# Patient Record
Sex: FEMALE | Race: OTHER | NOT HISPANIC OR LATINO | URBAN - METROPOLITAN AREA
[De-identification: names, ages, dates, MRNs, and addresses within clinical notes are randomized per-mention and may not be internally consistent; named-entity substitution may affect disease eponyms.]

---

## 2017-06-26 ENCOUNTER — INPATIENT (INPATIENT)
Facility: HOSPITAL | Age: 62
LOS: 0 days | Discharge: HOME | DRG: 287 | End: 2017-06-27
Attending: INTERNAL MEDICINE | Admitting: INTERNAL MEDICINE
Payer: COMMERCIAL

## 2017-06-26 VITALS
SYSTOLIC BLOOD PRESSURE: 125 MMHG | TEMPERATURE: 98 F | RESPIRATION RATE: 14 BRPM | HEART RATE: 82 BPM | OXYGEN SATURATION: 100 % | DIASTOLIC BLOOD PRESSURE: 74 MMHG

## 2017-06-26 DIAGNOSIS — R07.9 CHEST PAIN, UNSPECIFIED: ICD-10-CM

## 2017-06-26 LAB
ALBUMIN SERPL ELPH-MCNC: 4.5 G/DL — SIGNIFICANT CHANGE UP (ref 3.3–5)
ALP SERPL-CCNC: 65 U/L — SIGNIFICANT CHANGE UP (ref 40–120)
ALT FLD-CCNC: 39 U/L RC — SIGNIFICANT CHANGE UP (ref 10–45)
ANION GAP SERPL CALC-SCNC: 15 MMOL/L — SIGNIFICANT CHANGE UP (ref 5–17)
APTT BLD: 29.9 SEC — SIGNIFICANT CHANGE UP (ref 27.5–37.4)
AST SERPL-CCNC: 40 U/L — SIGNIFICANT CHANGE UP (ref 10–40)
BASOPHILS # BLD AUTO: 0.1 K/UL — SIGNIFICANT CHANGE UP (ref 0–0.2)
BASOPHILS NFR BLD AUTO: 0.5 % — SIGNIFICANT CHANGE UP (ref 0–2)
BILIRUB SERPL-MCNC: 0.3 MG/DL — SIGNIFICANT CHANGE UP (ref 0.2–1.2)
BUN SERPL-MCNC: 18 MG/DL — SIGNIFICANT CHANGE UP (ref 7–23)
CALCIUM SERPL-MCNC: 10.3 MG/DL — SIGNIFICANT CHANGE UP (ref 8.4–10.5)
CHLORIDE SERPL-SCNC: 99 MMOL/L — SIGNIFICANT CHANGE UP (ref 96–108)
CK MB BLD-MCNC: 1.5 % — SIGNIFICANT CHANGE UP (ref 0–3.5)
CK MB CFR SERPL CALC: 2.4 NG/ML — SIGNIFICANT CHANGE UP (ref 0–3.8)
CK SERPL-CCNC: 157 U/L — SIGNIFICANT CHANGE UP (ref 25–170)
CO2 SERPL-SCNC: 27 MMOL/L — SIGNIFICANT CHANGE UP (ref 22–31)
CREAT SERPL-MCNC: 0.78 MG/DL — SIGNIFICANT CHANGE UP (ref 0.5–1.3)
EOSINOPHIL # BLD AUTO: 0.3 K/UL — SIGNIFICANT CHANGE UP (ref 0–0.5)
EOSINOPHIL NFR BLD AUTO: 3.1 % — SIGNIFICANT CHANGE UP (ref 0–6)
GLUCOSE SERPL-MCNC: 252 MG/DL — HIGH (ref 70–99)
HCT VFR BLD CALC: 37.2 % — SIGNIFICANT CHANGE UP (ref 34.5–45)
HGB BLD-MCNC: 12.5 G/DL — SIGNIFICANT CHANGE UP (ref 11.5–15.5)
INR BLD: 1.04 RATIO — SIGNIFICANT CHANGE UP (ref 0.88–1.16)
LYMPHOCYTES # BLD AUTO: 38.2 % — SIGNIFICANT CHANGE UP (ref 13–44)
LYMPHOCYTES # BLD AUTO: 4 K/UL — HIGH (ref 1–3.3)
MCHC RBC-ENTMCNC: 26.5 PG — LOW (ref 27–34)
MCHC RBC-ENTMCNC: 33.6 GM/DL — SIGNIFICANT CHANGE UP (ref 32–36)
MCV RBC AUTO: 79 FL — LOW (ref 80–100)
MONOCYTES # BLD AUTO: 0.7 K/UL — SIGNIFICANT CHANGE UP (ref 0–0.9)
MONOCYTES NFR BLD AUTO: 6.3 % — SIGNIFICANT CHANGE UP (ref 2–14)
NEUTROPHILS # BLD AUTO: 5.4 K/UL — SIGNIFICANT CHANGE UP (ref 1.8–7.4)
NEUTROPHILS NFR BLD AUTO: 51.9 % — SIGNIFICANT CHANGE UP (ref 43–77)
NT-PROBNP SERPL-SCNC: 22 PG/ML — SIGNIFICANT CHANGE UP (ref 0–300)
PLATELET # BLD AUTO: 301 K/UL — SIGNIFICANT CHANGE UP (ref 150–400)
POTASSIUM SERPL-MCNC: 4.4 MMOL/L — SIGNIFICANT CHANGE UP (ref 3.5–5.3)
POTASSIUM SERPL-SCNC: 4.4 MMOL/L — SIGNIFICANT CHANGE UP (ref 3.5–5.3)
PROT SERPL-MCNC: 8.4 G/DL — HIGH (ref 6–8.3)
PROTHROM AB SERPL-ACNC: 11.3 SEC — SIGNIFICANT CHANGE UP (ref 9.8–12.7)
RBC # BLD: 4.71 M/UL — SIGNIFICANT CHANGE UP (ref 3.8–5.2)
RBC # FLD: 12.8 % — SIGNIFICANT CHANGE UP (ref 10.3–14.5)
SODIUM SERPL-SCNC: 141 MMOL/L — SIGNIFICANT CHANGE UP (ref 135–145)
TROPONIN T SERPL-MCNC: <0.01 NG/ML — SIGNIFICANT CHANGE UP (ref 0–0.06)
WBC # BLD: 10.4 K/UL — SIGNIFICANT CHANGE UP (ref 3.8–10.5)
WBC # FLD AUTO: 10.4 K/UL — SIGNIFICANT CHANGE UP (ref 3.8–10.5)

## 2017-06-26 PROCEDURE — 71010: CPT | Mod: 26

## 2017-06-26 PROCEDURE — 93010 ELECTROCARDIOGRAM REPORT: CPT

## 2017-06-26 PROCEDURE — 71275 CT ANGIOGRAPHY CHEST: CPT | Mod: 26

## 2017-06-26 PROCEDURE — 99285 EMERGENCY DEPT VISIT HI MDM: CPT | Mod: 25

## 2017-06-26 RX ORDER — GLUCAGON INJECTION, SOLUTION 0.5 MG/.1ML
1 INJECTION, SOLUTION SUBCUTANEOUS ONCE
Qty: 0 | Refills: 0 | Status: DISCONTINUED | OUTPATIENT
Start: 2017-06-26 | End: 2017-06-27

## 2017-06-26 RX ORDER — METOPROLOL TARTRATE 50 MG
25 TABLET ORAL
Qty: 0 | Refills: 0 | Status: DISCONTINUED | OUTPATIENT
Start: 2017-06-26 | End: 2017-06-27

## 2017-06-26 RX ORDER — DEXTROSE 50 % IN WATER 50 %
12.5 SYRINGE (ML) INTRAVENOUS ONCE
Qty: 0 | Refills: 0 | Status: DISCONTINUED | OUTPATIENT
Start: 2017-06-26 | End: 2017-06-27

## 2017-06-26 RX ORDER — INSULIN LISPRO 100/ML
VIAL (ML) SUBCUTANEOUS AT BEDTIME
Qty: 0 | Refills: 0 | Status: DISCONTINUED | OUTPATIENT
Start: 2017-06-26 | End: 2017-06-27

## 2017-06-26 RX ORDER — INSULIN LISPRO 100/ML
VIAL (ML) SUBCUTANEOUS
Qty: 0 | Refills: 0 | Status: DISCONTINUED | OUTPATIENT
Start: 2017-06-26 | End: 2017-06-27

## 2017-06-26 RX ORDER — SODIUM CHLORIDE 9 MG/ML
1000 INJECTION, SOLUTION INTRAVENOUS
Qty: 0 | Refills: 0 | Status: DISCONTINUED | OUTPATIENT
Start: 2017-06-26 | End: 2017-06-27

## 2017-06-26 RX ORDER — ENOXAPARIN SODIUM 100 MG/ML
40 INJECTION SUBCUTANEOUS DAILY
Qty: 0 | Refills: 0 | Status: DISCONTINUED | OUTPATIENT
Start: 2017-06-26 | End: 2017-06-27

## 2017-06-26 RX ORDER — CLOPIDOGREL BISULFATE 75 MG/1
75 TABLET, FILM COATED ORAL DAILY
Qty: 0 | Refills: 0 | Status: DISCONTINUED | OUTPATIENT
Start: 2017-06-26 | End: 2017-06-27

## 2017-06-26 RX ORDER — SIMVASTATIN 20 MG/1
10 TABLET, FILM COATED ORAL AT BEDTIME
Qty: 0 | Refills: 0 | Status: DISCONTINUED | OUTPATIENT
Start: 2017-06-26 | End: 2017-06-27

## 2017-06-26 RX ORDER — ASPIRIN/CALCIUM CARB/MAGNESIUM 324 MG
324 TABLET ORAL ONCE
Qty: 0 | Refills: 0 | Status: COMPLETED | OUTPATIENT
Start: 2017-06-26 | End: 2017-06-26

## 2017-06-26 RX ORDER — DEXTROSE 50 % IN WATER 50 %
25 SYRINGE (ML) INTRAVENOUS ONCE
Qty: 0 | Refills: 0 | Status: DISCONTINUED | OUTPATIENT
Start: 2017-06-26 | End: 2017-06-27

## 2017-06-26 RX ORDER — DEXTROSE 50 % IN WATER 50 %
1 SYRINGE (ML) INTRAVENOUS ONCE
Qty: 0 | Refills: 0 | Status: DISCONTINUED | OUTPATIENT
Start: 2017-06-26 | End: 2017-06-27

## 2017-06-26 RX ADMIN — Medication 324 MILLIGRAM(S): at 13:18

## 2017-06-26 RX ADMIN — Medication 25 MILLIGRAM(S): at 16:47

## 2017-06-26 RX ADMIN — SIMVASTATIN 10 MILLIGRAM(S): 20 TABLET, FILM COATED ORAL at 21:15

## 2017-06-26 NOTE — H&P ADULT - NSHPPHYSICALEXAM_GEN_ALL_CORE
PHYSICAL EXAMINATION:  Vital Signs Last 24 Hrs  T(C): 36.6, Max: 36.6 (06-26 @ 12:53)  T(F): 97.8, Max: 97.8 (06-26 @ 12:53)  HR: 91 (82 - 91)  BP: 129/71 (125/74 - 129/71)  BP(mean): --  RR: 16 (14 - 16)  SpO2: 98% (98% - 100%)  CAPILLARY BLOOD GLUCOSE        GENERAL: NAD, well-groomed, well-developed  HEAD:  atraumatic, normocephalic  EYES: sclera anicteric  ENMT: mucous membranes moist  NECK: supple, No JVD  CHEST/LUNG: clear to auscultation bilaterally; no rales, rhonchi, or wheezing b/l  HEART: normal S1, S2  ABDOMEN: BS+, soft, ND, NT   EXTREMITIES:  pulses palpable; no clubbing, cyanosis, or edema b/l LEs  NEURO: awake, alert, interactive; moves all extremities  SKIN: no rashes or lesions

## 2017-06-26 NOTE — ED PROVIDER NOTE - OBJECTIVE STATEMENT
62yF h/o DM, HTN, HLD, never smoker, no CAD last stress test 2 years ago presents with SOB and chest pain with exertion and at rest x 3 days. Pain is L sided, radiates to L arm and described as burning.  Denies recent travel, h/o cancer, h/o DVT or PE, melena, BRBPR, exogenous estrogen. On daily ASA 81, did not take ASA today.  PMD Desmond Angulo ([place])    HEART score 5

## 2017-06-26 NOTE — H&P ADULT - NSHPLABSRESULTS_GEN_ALL_CORE
LABS:                        12.5   10.4  )-----------( 301      ( 26 Jun 2017 13:00 )             37.2     06-26    141  |  99  |  18  ----------------------------<  252<H>  4.4   |  27  |  0.78    Ca    10.3      26 Jun 2017 13:00    TPro  8.4<H>  /  Alb  4.5  /  TBili  0.3  /  DBili  x   /  AST  40  /  ALT  39  /  AlkPhos  65  06-26    PT/INR - ( 26 Jun 2017 13:00 )   PT: 11.3 sec;   INR: 1.04 ratio         PTT - ( 26 Jun 2017 13:00 )  PTT:29.9 sec        RADIOLOGY & ADDITIONAL TESTS:

## 2017-06-26 NOTE — H&P ADULT - ASSESSMENT
pt  with  cp/  sob  for  1 day,  h/o  htn.  dm,  cardcalled, may need  cath,  asa. statin lopressor, tele

## 2017-06-26 NOTE — ED ADULT NURSE NOTE - OBJECTIVE STATEMENT
61 y/o F pt w/ pmh of HTN, HLD, DM, present to ED with left side chest pain that radiates left shoulder and arm, with SOB and tingling to fingers, symptoms started 3 days ago, worsen the last few days, had chest at rest as well as with exertion, pain is left side, intermittent, last about 5 minutes, along with SOB, left shoulder and arm pain tingling to b/l fingers, denies N/V/D, fever, chills, dizziness, dysuria, hematuria, on exam. 5/10 left side chest pain, paresthesia to b/l fingers, VSS, EKG done and given to MD, placed on  NSR, labs drawn and sent

## 2017-06-26 NOTE — H&P ADULT - HISTORY OF PRESENT ILLNESS
: 62yF h/o DM, HTN, HLD, never smoker, no CAD last stress test 2 years ago presents with SOB and chest pain with exertion and at rest x 3 days. Pain is L sided, radiates to L arm and described as burning.  Denies recent travel, h/o cancer, h/o DVT or PE, melena, BRBPR, exogenous estrogen. On daily ASA 81, did not take ASA today.,  seen in  er PMD Desmond Angulo ([place])  HEART score 5

## 2017-06-26 NOTE — CONSULT NOTE ADULT - SUBJECTIVE AND OBJECTIVE BOX
CHIEF COMPLAINT:Patient is a 62y old  Female who presents with a chief complaint of cp (26 Jun 2017 14:06)      HPI:  : 62yF h/o DM, HTN, HLD, never smoker, no CAD last stress test 2 years ago presents with SOB and chest pain with exertion and at rest x 3 days. Pain is L sided, radiates to L arm and described as burning.  Denies recent travel, h/o cancer, h/o DVT or PE, melena, BRBPR, exogenous estrogen. On daily ASA 81, did not take ASA today.,  seen in  er PMD Desmond Angulo ([place])  HEART score 5 (26 Jun 2017 14:06)      PAST MEDICAL & SURGICAL HISTORY:  HLD (hyperlipidemia)  DM (diabetes mellitus)  HTN (hypertension)      MEDICATIONS  (STANDING):  simvastatin 10milliGRAM(s) Oral at bedtime  metoprolol 25milliGRAM(s) Oral two times a day  enalapril 2.5milliGRAM(s) Oral daily  enoxaparin Injectable 40milliGRAM(s) SubCutaneous daily    MEDICATIONS  (PRN):      FAMILY HISTORY:      SOCIAL HISTORY:    [ ] Non-smoker  [ ] Smoker  [ ] Alcohol    Allergies    No Known Allergies    Intolerances    	    REVIEW OF SYSTEMS:  CONSTITUTIONAL: No fever, weight loss, or fatigue  EYES: No eye pain, visual disturbances, or discharge  ENT:  No difficulty hearing, tinnitus, vertigo; No sinus or throat pain  NECK: No pain or stiffness  RESPIRATORY: No cough, wheezing, chills or hemoptysis; No Shortness of Breath  CARDIOVASCULAR: + chest pain, no palpitations, passing out, dizziness, or leg swelling  GASTROINTESTINAL: No abdominal or epigastric pain. No nausea, vomiting, or hematemesis; No diarrhea or constipation. No melena or hematochezia.  GENITOURINARY: No dysuria, frequency, hematuria, or incontinence  NEUROLOGICAL: No headaches, memory loss, loss of strength, numbness, or tremors  SKIN: No itching, burning, rashes, or lesions   LYMPH Nodes: No enlarged glands  ENDOCRINE: No heat or cold intolerance; No hair loss  MUSCULOSKELETAL: No joint pain or swelling; No muscle, back, or extremity pain  PSYCHIATRIC: No depression, anxiety, mood swings, or difficulty sleeping  HEME/LYMPH: No easy bruising, or bleeding gums  ALLERGY AND IMMUNOLOGIC: No hives or eczema	    [ ] All others negative	  [ ] Unable to obtain    PHYSICAL EXAM:  T(C): 36.6, Max: 36.6 (06-26 @ 12:53)  HR: 91 (82 - 91)  BP: 129/71 (125/74 - 129/71)  RR: 16 (14 - 16)  SpO2: 98% (98% - 100%)  Wt(kg): --  I&O's Summary      Appearance: Normal	  HEENT:   Normal oral mucosa, PERRL, EOMI	  Lymphatic: No lymphadenopathy  Cardiovascular: Normal S1 S2, No JVD, No murmurs, No edema  Respiratory: Lungs clear to auscultation	  Psychiatry: A & O x 3, Mood & affect appropriate  Gastrointestinal:  Soft, Non-tender, + BS	  Skin: No rashes, No ecchymoses, No cyanosis	  Neurologic: Non-focal  Extremities: Normal range of motion, No clubbing, cyanosis or edema  Vascular: Peripheral pulses palpable 2+ bilaterally    TELEMETRY: 	    ECG:  	  RADIOLOGY:  OTHER: 	  	  LABS:	 	    CARDIAC MARKERS:  CARDIAC MARKERS ( 26 Jun 2017 13:00 )  x     / <0.01 ng/mL / 157 U/L / x     / 2.4 ng/mL                              12.5   10.4  )-----------( 301      ( 26 Jun 2017 13:00 )             37.2     06-26    141  |  99  |  18  ----------------------------<  252<H>  4.4   |  27  |  0.78    Ca    10.3      26 Jun 2017 13:00    TPro  8.4<H>  /  Alb  4.5  /  TBili  0.3  /  DBili  x   /  AST  40  /  ALT  39  /  AlkPhos  65  06-26    proBNP: Serum Pro-Brain Natriuretic Peptide: 22 pg/mL (06-26 @ 13:00)    Lipid Profile:   HgA1c:   TSH:     PREVIOUS DIAGNOSTIC TESTING:    [ ] Echocardiogram:  [ ]  Catheterization:  [ ] Stress Test:

## 2017-06-26 NOTE — ED PROVIDER NOTE - ATTENDING CONTRIBUTION TO CARE
Private Physician  Desmond Angulo (PCP/ [facility]),   62y female pmh DM,HTN,HLD, no habits, no coronary artery disease,last stress test 2y ago, reported "OK" to pt, No travel, cancer, mi,pe/dvt,ocp, pt comes to ed complains of chest pain shortness of breath. past 4days. Frequent onset with walking. and lying down. CP 9/10 to 5/10. Rad to left arm, no cough. fever chills. no abd pain +occasional diaphoresis. PE WDWN Female nad normocephalic atraumatic chest clesar anterior & posterior abd soft +bs, Cv no rmg, neuro no focal defects  Simon Garay MD, [place]

## 2017-06-26 NOTE — H&P ADULT - NSHPREVIEWOFSYSTEMS_GEN_ALL_CORE
REVIEW OF SYSTEMS:    CONSTITUTIONAL: No weakness, fevers or chills  EYES/ENT: No visual changes;  No vertigo or throat pain   NECK: No pain or stiffness  RESPIRATORY: No cough, wheezing, hemoptysis; No shortness of breath  CARDIOVASCULAR:  chest pain , no palpitations  GASTROINTESTINAL: No abdominal or epigastric pain. No nausea, vomiting, or hematemesis; No diarrhea or constipation. No melena or hematochezia.  GENITOURINARY: No dysuria, frequency or hematuria  NEUROLOGICAL: No numbness or weakness  SKIN: No itching, rashes

## 2017-06-27 VITALS
DIASTOLIC BLOOD PRESSURE: 70 MMHG | SYSTOLIC BLOOD PRESSURE: 124 MMHG | RESPIRATION RATE: 18 BRPM | OXYGEN SATURATION: 95 % | HEART RATE: 78 BPM

## 2017-06-27 LAB
HBA1C BLD-MCNC: 9 % — HIGH (ref 4–5.6)
TROPONIN T SERPL-MCNC: <0.01 NG/ML — SIGNIFICANT CHANGE UP (ref 0–0.06)

## 2017-06-27 PROCEDURE — 83880 ASSAY OF NATRIURETIC PEPTIDE: CPT

## 2017-06-27 PROCEDURE — C1769: CPT

## 2017-06-27 PROCEDURE — 84484 ASSAY OF TROPONIN QUANT: CPT

## 2017-06-27 PROCEDURE — 71045 X-RAY EXAM CHEST 1 VIEW: CPT

## 2017-06-27 PROCEDURE — 93458 L HRT ARTERY/VENTRICLE ANGIO: CPT

## 2017-06-27 PROCEDURE — 82553 CREATINE MB FRACTION: CPT

## 2017-06-27 PROCEDURE — 85027 COMPLETE CBC AUTOMATED: CPT

## 2017-06-27 PROCEDURE — 93005 ELECTROCARDIOGRAM TRACING: CPT

## 2017-06-27 PROCEDURE — 85730 THROMBOPLASTIN TIME PARTIAL: CPT

## 2017-06-27 PROCEDURE — 71275 CT ANGIOGRAPHY CHEST: CPT

## 2017-06-27 PROCEDURE — 85379 FIBRIN DEGRADATION QUANT: CPT

## 2017-06-27 PROCEDURE — C1887: CPT

## 2017-06-27 PROCEDURE — 99285 EMERGENCY DEPT VISIT HI MDM: CPT | Mod: 25

## 2017-06-27 PROCEDURE — 80053 COMPREHEN METABOLIC PANEL: CPT

## 2017-06-27 PROCEDURE — 83036 HEMOGLOBIN GLYCOSYLATED A1C: CPT

## 2017-06-27 PROCEDURE — 85610 PROTHROMBIN TIME: CPT

## 2017-06-27 PROCEDURE — C1894: CPT

## 2017-06-27 PROCEDURE — 82550 ASSAY OF CK (CPK): CPT

## 2017-06-27 RX ORDER — METOPROLOL TARTRATE 50 MG
1 TABLET ORAL
Qty: 0 | Refills: 0 | DISCHARGE
Start: 2017-06-27

## 2017-06-27 RX ORDER — SIMVASTATIN 20 MG/1
1 TABLET, FILM COATED ORAL
Qty: 0 | Refills: 0 | DISCHARGE
Start: 2017-06-27

## 2017-06-27 RX ADMIN — Medication 25 MILLIGRAM(S): at 17:08

## 2017-06-27 RX ADMIN — Medication 4: at 17:07

## 2017-06-27 RX ADMIN — Medication 25 MILLIGRAM(S): at 05:47

## 2017-06-27 RX ADMIN — Medication 2: at 08:10

## 2017-06-27 RX ADMIN — CLOPIDOGREL BISULFATE 75 MILLIGRAM(S): 75 TABLET, FILM COATED ORAL at 08:10

## 2017-06-27 RX ADMIN — Medication 2.5 MILLIGRAM(S): at 05:47

## 2017-06-27 NOTE — DISCHARGE NOTE ADULT - HOSPITAL COURSE
To be completed by attending physician Pt adm with CP.  Cath with mild CAD.  DC in stable condition. F/U pmd next week.

## 2017-06-27 NOTE — DISCHARGE NOTE ADULT - CARE PLAN
Principal Discharge DX:	Chest pain with high risk for cardiac etiology  Secondary Diagnosis:	HLD (hyperlipidemia)  Secondary Diagnosis:	HTN (hypertension) Principal Discharge DX:	Chest pain with high risk for cardiac etiology  Goal:	s/p cardiac catheterization with non-obstructive CAD  Instructions for follow-up, activity and diet:	Continue current medications  follow up with cardiologist  Secondary Diagnosis:	HLD (hyperlipidemia)  Instructions for follow-up, activity and diet:	continue simvastatin  Secondary Diagnosis:	HTN (hypertension)  Instructions for follow-up, activity and diet:	continue current home medications

## 2017-06-27 NOTE — DISCHARGE NOTE ADULT - PATIENT PORTAL LINK FT
“You can access the Follow[facility] Patient Portal, offered by [facility], by registering with the following website: [email or web address]”

## 2017-06-27 NOTE — DISCHARGE NOTE ADULT - PROVIDER TOKENS
TOKEN:'[email or web address]',FREE:[LAST:[PMD in 1week],PHONE:[(   )    -],FAX:[(   )    -]] TOKEN:'[email or web address]',FREE:[LAST:[PMD in 1week],PHONE:[(   )    -],FAX:[(   )    -]],FREE:[LAST:[Cardiologist in 1week],PHONE:[(   )    -],FAX:[(   )    -]]

## 2017-06-27 NOTE — DISCHARGE NOTE ADULT - MEDICATION SUMMARY - MEDICATIONS TO TAKE
I will START or STAY ON the medications listed below when I get home from the hospital:    aspirin 81 mg oral delayed release capsule  -- 81 milligram(s) by mouth once a day  -- Indication: For Chest pain    enalapril 2.5 mg oral tablet  -- 1 tab(s) by mouth once a day  -- Indication: For HTN (hypertension)    gabapentin 300 mg oral tablet  -- 300 milligram(s) by mouth 2 times a day  -- Indication: For neuropathy    glyburide-metformin 5 mg-500 mg oral tablet  -- 1 tab(s) by mouth 2 times a day  -- Indication: For DM (diabetes mellitus)    Januvia 50 mg oral tablet  -- 1 tab(s) by mouth 2 times a day  -- Indication: For DM (diabetes mellitus)    simvastatin 10 mg oral tablet  -- 1 tab(s) by mouth once a day (at bedtime)  -- Indication: For HLD (hyperlipidemia)    metoprolol tartrate 25 mg oral tablet  -- 1 tab(s) by mouth 2 times a day  -- Indication: For HTN (hypertension)    NexIUM 40 mg oral delayed release capsule  -- 1 cap(s) by mouth once a day  -- Indication: For gerd

## 2017-06-27 NOTE — DISCHARGE NOTE ADULT - CARE PROVIDER_API CALL
Chapito Pham (DO), Cardiology; Interventional Cardiology  [address] [place] [address]  Phone: [phone]  Fax: [phone]    PMD in 1week,   Phone: (   )    -  Fax: (   )    - Chapito Pham (DO), Cardiology; Interventional Cardiology  [address] [place] [address]  Phone: [phone]  Fax: [phone]    PMD in 1week,   Phone: (   )    -  Fax: (   )    -    Cardiologist in 1week,   Phone: (   )    -  Fax: (   )    -

## 2017-06-27 NOTE — DISCHARGE NOTE ADULT - PLAN OF CARE
s/p cardiac catheterization with non-obstructive CAD Continue current medications  follow up with cardiologist continue simvastatin continue current home medications

## 2017-06-27 NOTE — PROGRESS NOTE ADULT - SUBJECTIVE AND OBJECTIVE BOX
- Patient seen and examined.  - In summary, patient is a 62y year old woman who presented with cp (2017 14:06)  - Today, patient is without complaints.         *****MEDICATIONS:    MEDICATIONS  (STANDING):  simvastatin 10 milliGRAM(s) Oral at bedtime  metoprolol 25 milliGRAM(s) Oral two times a day  enalapril 2.5 milliGRAM(s) Oral daily  enoxaparin Injectable 40 milliGRAM(s) SubCutaneous daily  clopidogrel Tablet 75 milliGRAM(s) Oral daily  insulin lispro (HumaLOG) corrective regimen sliding scale   SubCutaneous three times a day before meals  insulin lispro (HumaLOG) corrective regimen sliding scale   SubCutaneous at bedtime  dextrose 5%. 1000 milliLiter(s) (50 mL/Hr) IV Continuous <Continuous>  dextrose 50% Injectable 12.5 Gram(s) IV Push once  dextrose 50% Injectable 25 Gram(s) IV Push once  dextrose 50% Injectable 25 Gram(s) IV Push once    MEDICATIONS  (PRN):  dextrose Gel 1 Dose(s) Oral once PRN Blood Glucose LESS THAN 70 milliGRAM(s)/deciliter  glucagon  Injectable 1 milliGRAM(s) IntraMuscular once PRN Glucose LESS THAN 70 milligrams/deciliter           ***** REVIEW OF SYSTEM:  GEN: no fever, no chills, no pain  RESP: no SOB, no cough, no sputum  CVS: no chest pain, no palpitations, no edema  GI: no abdominal pain, no nausea, no vomiting, no constipation, no diarrhea  : no dysurea, no frequency  NEURO: no headache, no diziness  PSYCH: no depression, not anxious  Derm : no itching, no rash         ***** VITAL SIGNS:  T(F): 97.6 (17 @ 04:47), Max: 97.8 (17 @ 12:53)  HR: 67 (17 @ 05:31) (67 - 91)  BP: 120/74 (17 @ 05:31) (111/71 - 129/71)  RR: 18 (17 @ 04:47) (14 - 18)  SpO2: 97% (17 @ 04:47) (96% - 100%)  Wt(kg): --  ,   I&O's Summary    2017 07:01  -  2017 07:00  --------------------------------------------------------  IN: 240 mL / OUT: 0 mL / NET: 240 mL    2017 07:  -  2017 10:34  --------------------------------------------------------  IN: 360 mL / OUT: 0 mL / NET: 360 mL             *****PHYSICAL EXAM:  GEN: A&O X 3 , NAD , comfortable  HEENT: NCAT, EOMI, MMM, no icterus  NECK: Supple, No JVD  CVS: S1S2 , regular , No M/R/G appreciated  PULM: CTA B/L,  no W/R/R appreciated  ABD.: soft. non tender, non distended,  bowel sounds present  Extrem: intact pulses , no edema noted  Derm: No rash or ecchymosis noted  PSYCH: normal mood, no depression, not anxious         *****LAB AND IMAGIN.5   10.4  )-----------( 301      ( 2017 13:00 )             37.2                   141  |  99  |  18  ----------------------------<  252<H>  4.4   |  27  |  0.78    Ca    10.3      2017 13:00    TPro  8.4<H>  /  Alb  4.5  /  TBili  0.3  /  DBili  x   /  AST  40  /  ALT  39  /  AlkPhos  65  06-    PT/INR - ( 2017 13:00 )   PT: 11.3 sec;   INR: 1.04 ratio         PTT - ( 2017 13:00 )  PTT:29.9 sec       CARDIAC MARKERS ( 2017 23:57 )  x     / <0.01 ng/mL / x     / x     / x      CARDIAC MARKERS ( 2017 13:00 )  x     / <0.01 ng/mL / 157 U/L / x     / 2.4 ng/mL                [All pertinent recent Imaging/Reports reviewed]         *****A S S E S S M E N T   A N D   P L A N :  62F CP, CAD risk factors  cath today  cont ASA  VSS  cont current tx        __________________________  A. LAURA Pham.

## 2018-12-17 ENCOUNTER — EMERGENCY (EMERGENCY)
Facility: HOSPITAL | Age: 63
LOS: 1 days | Discharge: HOME | End: 2018-12-17
Attending: EMERGENCY MEDICINE
Payer: COMMERCIAL

## 2018-12-17 VITALS
HEART RATE: 79 BPM | HEIGHT: 62 IN | WEIGHT: 175.05 LBS | SYSTOLIC BLOOD PRESSURE: 137 MMHG | DIASTOLIC BLOOD PRESSURE: 83 MMHG | OXYGEN SATURATION: 98 % | TEMPERATURE: 98 F | RESPIRATION RATE: 18 BRPM

## 2018-12-17 VITALS
OXYGEN SATURATION: 99 % | HEART RATE: 76 BPM | DIASTOLIC BLOOD PRESSURE: 72 MMHG | SYSTOLIC BLOOD PRESSURE: 131 MMHG | RESPIRATION RATE: 18 BRPM | TEMPERATURE: 98 F

## 2018-12-17 PROBLEM — I10 ESSENTIAL (PRIMARY) HYPERTENSION: Chronic | Status: ACTIVE | Noted: 2017-06-26

## 2018-12-17 PROBLEM — E78.5 HYPERLIPIDEMIA, UNSPECIFIED: Chronic | Status: ACTIVE | Noted: 2017-06-26

## 2018-12-17 PROBLEM — E11.9 TYPE 2 DIABETES MELLITUS WITHOUT COMPLICATIONS: Chronic | Status: ACTIVE | Noted: 2017-06-26

## 2018-12-17 LAB
ALBUMIN SERPL ELPH-MCNC: 4.2 G/DL — SIGNIFICANT CHANGE UP (ref 3.3–5)
ALP SERPL-CCNC: 67 U/L — SIGNIFICANT CHANGE UP (ref 40–120)
ALT FLD-CCNC: 19 U/L — SIGNIFICANT CHANGE UP (ref 10–45)
ANION GAP SERPL CALC-SCNC: 13 MMOL/L — SIGNIFICANT CHANGE UP (ref 5–17)
AST SERPL-CCNC: 19 U/L — SIGNIFICANT CHANGE UP (ref 10–40)
BASOPHILS # BLD AUTO: 0.1 K/UL — SIGNIFICANT CHANGE UP (ref 0–0.2)
BASOPHILS NFR BLD AUTO: 0.7 % — SIGNIFICANT CHANGE UP (ref 0–2)
BILIRUB SERPL-MCNC: 0.2 MG/DL — SIGNIFICANT CHANGE UP (ref 0.2–1.2)
BUN SERPL-MCNC: 16 MG/DL — SIGNIFICANT CHANGE UP (ref 7–23)
CALCIUM SERPL-MCNC: 9.4 MG/DL — SIGNIFICANT CHANGE UP (ref 8.4–10.5)
CHLORIDE SERPL-SCNC: 101 MMOL/L — SIGNIFICANT CHANGE UP (ref 96–108)
CO2 SERPL-SCNC: 25 MMOL/L — SIGNIFICANT CHANGE UP (ref 22–31)
CREAT SERPL-MCNC: 0.64 MG/DL — SIGNIFICANT CHANGE UP (ref 0.5–1.3)
EOSINOPHIL # BLD AUTO: 0.3 K/UL — SIGNIFICANT CHANGE UP (ref 0–0.5)
EOSINOPHIL NFR BLD AUTO: 3.4 % — SIGNIFICANT CHANGE UP (ref 0–6)
GLUCOSE SERPL-MCNC: 92 MG/DL — SIGNIFICANT CHANGE UP (ref 70–99)
HCT VFR BLD CALC: 34.7 % — SIGNIFICANT CHANGE UP (ref 34.5–45)
HGB BLD-MCNC: 11.5 G/DL — SIGNIFICANT CHANGE UP (ref 11.5–15.5)
LYMPHOCYTES # BLD AUTO: 3.7 K/UL — HIGH (ref 1–3.3)
LYMPHOCYTES # BLD AUTO: 38.1 % — SIGNIFICANT CHANGE UP (ref 13–44)
MCHC RBC-ENTMCNC: 25.9 PG — LOW (ref 27–34)
MCHC RBC-ENTMCNC: 33.2 GM/DL — SIGNIFICANT CHANGE UP (ref 32–36)
MCV RBC AUTO: 78 FL — LOW (ref 80–100)
MONOCYTES # BLD AUTO: 0.5 K/UL — SIGNIFICANT CHANGE UP (ref 0–0.9)
MONOCYTES NFR BLD AUTO: 4.8 % — SIGNIFICANT CHANGE UP (ref 2–14)
NEUTROPHILS # BLD AUTO: 5.2 K/UL — SIGNIFICANT CHANGE UP (ref 1.8–7.4)
NEUTROPHILS NFR BLD AUTO: 53 % — SIGNIFICANT CHANGE UP (ref 43–77)
PLATELET # BLD AUTO: 298 K/UL — SIGNIFICANT CHANGE UP (ref 150–400)
POTASSIUM SERPL-MCNC: 4.6 MMOL/L — SIGNIFICANT CHANGE UP (ref 3.5–5.3)
POTASSIUM SERPL-SCNC: 4.6 MMOL/L — SIGNIFICANT CHANGE UP (ref 3.5–5.3)
PROT SERPL-MCNC: 7.6 G/DL — SIGNIFICANT CHANGE UP (ref 6–8.3)
RBC # BLD: 4.45 M/UL — SIGNIFICANT CHANGE UP (ref 3.8–5.2)
RBC # FLD: 12.7 % — SIGNIFICANT CHANGE UP (ref 10.3–14.5)
SODIUM SERPL-SCNC: 139 MMOL/L — SIGNIFICANT CHANGE UP (ref 135–145)
TROPONIN T, HIGH SENSITIVITY RESULT: 7 NG/L — SIGNIFICANT CHANGE UP (ref 0–51)
TROPONIN T, HIGH SENSITIVITY RESULT: 8 NG/L — SIGNIFICANT CHANGE UP (ref 0–51)
WBC # BLD: 9.8 K/UL — SIGNIFICANT CHANGE UP (ref 3.8–10.5)
WBC # FLD AUTO: 9.8 K/UL — SIGNIFICANT CHANGE UP (ref 3.8–10.5)

## 2018-12-17 PROCEDURE — 85027 COMPLETE CBC AUTOMATED: CPT

## 2018-12-17 PROCEDURE — 99285 EMERGENCY DEPT VISIT HI MDM: CPT | Mod: 25

## 2018-12-17 PROCEDURE — 93005 ELECTROCARDIOGRAM TRACING: CPT

## 2018-12-17 PROCEDURE — 84484 ASSAY OF TROPONIN QUANT: CPT

## 2018-12-17 PROCEDURE — 71275 CT ANGIOGRAPHY CHEST: CPT

## 2018-12-17 PROCEDURE — 99284 EMERGENCY DEPT VISIT MOD MDM: CPT | Mod: 25

## 2018-12-17 PROCEDURE — 93010 ELECTROCARDIOGRAM REPORT: CPT

## 2018-12-17 PROCEDURE — 36415 COLL VENOUS BLD VENIPUNCTURE: CPT

## 2018-12-17 PROCEDURE — 80053 COMPREHEN METABOLIC PANEL: CPT

## 2018-12-17 PROCEDURE — 71275 CT ANGIOGRAPHY CHEST: CPT | Mod: 26

## 2018-12-17 RX ORDER — ACETAMINOPHEN 500 MG
650 TABLET ORAL ONCE
Qty: 0 | Refills: 0 | Status: COMPLETED | OUTPATIENT
Start: 2018-12-17 | End: 2018-12-17

## 2018-12-17 RX ADMIN — Medication 650 MILLIGRAM(S): at 08:29
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
## 2018-12-17 NOTE — ED PROVIDER NOTE - CARE PLAN
Principal Discharge DX:	Chest pain at rest Principal Discharge DX:	Chest pain at rest  Assessment and plan of treatment:	1. Follow up with your primary care physician within 2-3days for reevaluation.  2.  Return to the Emergency Department for worsening, progressive or any other concerning symptoms.

## 2018-12-17 NOTE — ED PROVIDER NOTE - NSFOLLOWUPINSTRUCTIONS_ED_ALL_ED_FT
1. Follow up with your primary care physician within 2-3days for reevaluation.  2.  Return to the Emergency Department for worsening, progressive or any other concerning symptoms.   3.  Please take tylenol 650 mg every 4 hours as needed for pain. Please do not exceed more than 4,000mg of Tylenol in a day
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
## 2018-12-17 NOTE — ED PROVIDER NOTE - PHYSICAL EXAMINATION
Gen: NAD  Head: NCAT  Lung: CTAB, no respiratory distress, no wheezing, rales, rhonchi  CV: +chest wall tenderness mid-chest region, normal s1/s2, rrr, no murmurs, Normal perfusion, pulses 2+ throughout  Abd: soft, NTND  MSK: No edema, no visible deformities, full range of motion in all 4 extremities,  strength 5/5 bilaterally  Neuro: CN II-XII grossly intact, No focal neurologic deficits, no midline tenderness cervical spine, +paraspinal cervical tenderness on left  Skin: No rash   Psych: normal affect

## 2020-05-23 ENCOUNTER — TRANSCRIPTION ENCOUNTER (OUTPATIENT)
Age: 65
End: 2020-05-23

## 2020-10-31 ENCOUNTER — TRANSCRIPTION ENCOUNTER (OUTPATIENT)
Age: 65
End: 2020-10-31

## 2021-03-04 ENCOUNTER — APPOINTMENT (OUTPATIENT)
Dept: OTOLARYNGOLOGY | Facility: CLINIC | Age: 66
End: 2021-03-04
Payer: MEDICARE

## 2021-03-04 VITALS
DIASTOLIC BLOOD PRESSURE: 84 MMHG | SYSTOLIC BLOOD PRESSURE: 158 MMHG | WEIGHT: 175 LBS | BODY MASS INDEX: 29.16 KG/M2 | HEIGHT: 65 IN | HEART RATE: 73 BPM

## 2021-03-04 DIAGNOSIS — Z83.3 FAMILY HISTORY OF DIABETES MELLITUS: ICD-10-CM

## 2021-03-04 DIAGNOSIS — Z78.9 OTHER SPECIFIED HEALTH STATUS: ICD-10-CM

## 2021-03-04 DIAGNOSIS — E04.9 NONTOXIC GOITER, UNSPECIFIED: ICD-10-CM

## 2021-03-04 PROCEDURE — 99204 OFFICE O/P NEW MOD 45 MIN: CPT | Mod: 25

## 2021-03-04 PROCEDURE — 99072 ADDL SUPL MATRL&STAF TM PHE: CPT

## 2021-03-04 PROCEDURE — 69210 REMOVE IMPACTED EAR WAX UNI: CPT

## 2021-03-04 PROCEDURE — 31579 LARYNGOSCOPY TELESCOPIC: CPT

## 2021-03-04 RX ORDER — INSULIN DETEMIR 100 [IU]/ML
100 INJECTION, SOLUTION SUBCUTANEOUS
Refills: 0 | Status: ACTIVE | COMMUNITY

## 2021-03-04 RX ORDER — AMLODIPINE BESYLATE 5 MG/1
TABLET ORAL
Refills: 0 | Status: ACTIVE | COMMUNITY

## 2021-03-04 RX ORDER — SIMVASTATIN 80 MG/1
TABLET, FILM COATED ORAL
Refills: 0 | Status: ACTIVE | COMMUNITY

## 2021-03-04 RX ORDER — METOPROLOL TARTRATE 75 MG/1
TABLET, FILM COATED ORAL
Refills: 0 | Status: ACTIVE | COMMUNITY

## 2021-03-04 RX ORDER — METFORMIN HYDROCHLORIDE 625 MG/1
TABLET ORAL
Refills: 0 | Status: ACTIVE | COMMUNITY

## 2021-03-04 NOTE — REASON FOR VISIT
[Initial Evaluation] : an initial evaluation for [FreeTextEntry2] : bilateral clogged ears and throat check.

## 2021-03-04 NOTE — CONSULT LETTER
[Consult Letter:] : I had the pleasure of evaluating your patient, [unfilled]. [Please see my note below.] : Please see my note below. [Consult Closing:] : Thank you very much for allowing me to participate in the care of this patient.  If you have any questions, please do not hesitate to contact me. [Sincerely,] : Sincerely, [FreeTextEntry3] : Pierre Rueda MD, PhD\par Chief, Division of Laryngology\par Department of Otolaryngology\par [facility]\par Pediatric Otolaryngology, [facility]\par  of Otolaryngology\par [employer]\par

## 2021-03-04 NOTE — HISTORY OF PRESENT ILLNESS
[de-identified] : 66 year female bilateral clogged ears and throat check. Reports bilateral clogged ears. Denies otalgia, otorrhea, ear infections, hearing loss, tinnitus, dizziness, vertigo, headaches related to hearing. States had history of right sided neck swelling, had biopsy 03/2019 and was told it was normal. Denies throat pain, dysphagia, odynophagia, dyspnea, dysphonia, otalgia. Does use over the counter antacids or reflux medications. Denies history of smoking and alcohol use. Had previous thyroid biopsy. Minimal globus sensation during the day, endorses at night. Using reflux meds for GERD. Possible HL.\par  \par

## 2021-03-11 ENCOUNTER — APPOINTMENT (OUTPATIENT)
Dept: OTOLARYNGOLOGY | Facility: CLINIC | Age: 66
End: 2021-03-11

## 2021-03-17 DIAGNOSIS — K21.9 GASTRO-ESOPHAGEAL REFLUX DISEASE W/OUT ESOPHAGITIS: ICD-10-CM

## 2021-03-20 ENCOUNTER — OUTPATIENT (OUTPATIENT)
Dept: OUTPATIENT SERVICES | Facility: HOSPITAL | Age: 66
LOS: 1 days | End: 2021-03-20
Payer: MEDICARE

## 2021-03-20 ENCOUNTER — APPOINTMENT (OUTPATIENT)
Dept: ULTRASOUND IMAGING | Facility: IMAGING CENTER | Age: 66
End: 2021-03-20
Payer: MEDICARE

## 2021-03-20 ENCOUNTER — RESULT REVIEW (OUTPATIENT)
Age: 66
End: 2021-03-20

## 2021-03-20 DIAGNOSIS — E04.9 NONTOXIC GOITER, UNSPECIFIED: ICD-10-CM

## 2021-03-20 PROCEDURE — 76536 US EXAM OF HEAD AND NECK: CPT

## 2021-03-20 PROCEDURE — 76536 US EXAM OF HEAD AND NECK: CPT | Mod: 26

## 2021-05-13 ENCOUNTER — APPOINTMENT (OUTPATIENT)
Dept: OTOLARYNGOLOGY | Facility: CLINIC | Age: 66
End: 2021-05-13
Payer: MEDICARE

## 2021-05-13 VITALS
BODY MASS INDEX: 30.73 KG/M2 | DIASTOLIC BLOOD PRESSURE: 79 MMHG | HEART RATE: 83 BPM | HEIGHT: 64 IN | WEIGHT: 180 LBS | SYSTOLIC BLOOD PRESSURE: 125 MMHG

## 2021-05-13 PROCEDURE — 99214 OFFICE O/P EST MOD 30 MIN: CPT | Mod: 25

## 2021-05-13 PROCEDURE — 31579 LARYNGOSCOPY TELESCOPIC: CPT

## 2021-05-13 NOTE — CONSULT LETTER
[Dear  ___] : Dear  [unfilled], [Courtesy Letter:] : I had the pleasure of seeing your patient, [unfilled], in my office today. [Please see my note below.] : Please see my note below. [Consult Closing:] : Thank you very much for allowing me to participate in the care of this patient.  If you have any questions, please do not hesitate to contact me. [Sincerely,] : Sincerely, [FreeTextEntry2] : Dr. Natasha Angulo MD [FreeTextEntry3] : Pierre Rueda MD, PhD\par Chief, Division of Laryngology\par Department of Otolaryngology\par [facility]\par Pediatric Otolaryngology, [facility]\par  of Otolaryngology\par [facility] [employer] at [facility]

## 2021-05-13 NOTE — HISTORY OF PRESENT ILLNESS
[de-identified] : 66 year old female follow up for vocal fold paresis, history of Left neck mass and thyroid nodule, otalgia with clogged ears, LPR treated with esomeprazole, Thyroid US 3/20/21.  Reports no throat issues, mild voice changes secondary to seasonal allergies.  Denies throat pain, dysphagia, dyspnea, hemoptysis, recent fevers and throat infections.  Tolerating eating/drinking with no issues.  Reports recent Right otalgia, for the past week, when swallowing, pain becomes more intense.  Reports Right ear fullness/pressure.  Denies otorrhea, changes in hearing and recent ear infections.\par U/S 3/2021 shows similar single dominant nodule on right, previously biopsied, benign. Took abx from pcp. Taking ppi, helps.

## 2021-05-13 NOTE — REASON FOR VISIT
[Subsequent Evaluation] : a subsequent evaluation for [FreeTextEntry2] : follow up for vocal fold paresis

## 2021-05-19 ENCOUNTER — INPATIENT (INPATIENT)
Facility: HOSPITAL | Age: 66
LOS: 3 days | Discharge: HOME | DRG: 300 | End: 2021-05-23
Attending: INTERNAL MEDICINE | Admitting: INTERNAL MEDICINE
Payer: MEDICARE

## 2021-05-19 VITALS
DIASTOLIC BLOOD PRESSURE: 83 MMHG | OXYGEN SATURATION: 99 % | WEIGHT: 179.9 LBS | HEIGHT: 62 IN | SYSTOLIC BLOOD PRESSURE: 153 MMHG | HEART RATE: 93 BPM | TEMPERATURE: 98 F | RESPIRATION RATE: 17 BRPM

## 2021-05-19 DIAGNOSIS — I82.602 ACUTE EMBOLISM AND THROMBOSIS OF UNSPECIFIED VEINS OF LEFT UPPER EXTREMITY: ICD-10-CM

## 2021-05-19 LAB
ALBUMIN SERPL ELPH-MCNC: 4.1 G/DL — SIGNIFICANT CHANGE UP (ref 3.3–5)
ALP SERPL-CCNC: 75 U/L — SIGNIFICANT CHANGE UP (ref 40–120)
ALT FLD-CCNC: 39 U/L — SIGNIFICANT CHANGE UP (ref 10–45)
ANION GAP SERPL CALC-SCNC: 14 MMOL/L — SIGNIFICANT CHANGE UP (ref 5–17)
APTT BLD: 106 SEC — HIGH (ref 27.5–35.5)
APTT BLD: 29.3 SEC — SIGNIFICANT CHANGE UP (ref 27.5–35.5)
AST SERPL-CCNC: 21 U/L — SIGNIFICANT CHANGE UP (ref 10–40)
BASOPHILS # BLD AUTO: 0.08 K/UL — SIGNIFICANT CHANGE UP (ref 0–0.2)
BASOPHILS NFR BLD AUTO: 0.7 % — SIGNIFICANT CHANGE UP (ref 0–2)
BILIRUB SERPL-MCNC: 0.3 MG/DL — SIGNIFICANT CHANGE UP (ref 0.2–1.2)
BUN SERPL-MCNC: 21 MG/DL — SIGNIFICANT CHANGE UP (ref 7–23)
CALCIUM SERPL-MCNC: 9.9 MG/DL — SIGNIFICANT CHANGE UP (ref 8.4–10.5)
CHLORIDE SERPL-SCNC: 98 MMOL/L — SIGNIFICANT CHANGE UP (ref 96–108)
CO2 SERPL-SCNC: 22 MMOL/L — SIGNIFICANT CHANGE UP (ref 22–31)
CREAT SERPL-MCNC: 0.68 MG/DL — SIGNIFICANT CHANGE UP (ref 0.5–1.3)
EOSINOPHIL # BLD AUTO: 0.47 K/UL — SIGNIFICANT CHANGE UP (ref 0–0.5)
EOSINOPHIL NFR BLD AUTO: 4 % — SIGNIFICANT CHANGE UP (ref 0–6)
GLUCOSE BLDC GLUCOMTR-MCNC: 332 MG/DL — HIGH (ref 70–99)
GLUCOSE BLDC GLUCOMTR-MCNC: 368 MG/DL — HIGH (ref 70–99)
GLUCOSE SERPL-MCNC: 226 MG/DL — HIGH (ref 70–99)
HCT VFR BLD CALC: 37.2 % — SIGNIFICANT CHANGE UP (ref 34.5–45)
HCT VFR BLD CALC: 37.4 % — SIGNIFICANT CHANGE UP (ref 34.5–45)
HGB BLD-MCNC: 11.7 G/DL — SIGNIFICANT CHANGE UP (ref 11.5–15.5)
HGB BLD-MCNC: 12 G/DL — SIGNIFICANT CHANGE UP (ref 11.5–15.5)
IMM GRANULOCYTES NFR BLD AUTO: 0.5 % — SIGNIFICANT CHANGE UP (ref 0–1.5)
INR BLD: 0.88 RATIO — SIGNIFICANT CHANGE UP (ref 0.88–1.16)
LIDOCAIN IGE QN: 48 U/L — SIGNIFICANT CHANGE UP (ref 7–60)
LYMPHOCYTES # BLD AUTO: 34.9 % — SIGNIFICANT CHANGE UP (ref 13–44)
LYMPHOCYTES # BLD AUTO: 4.1 K/UL — HIGH (ref 1–3.3)
MCHC RBC-ENTMCNC: 25.3 PG — LOW (ref 27–34)
MCHC RBC-ENTMCNC: 25.3 PG — LOW (ref 27–34)
MCHC RBC-ENTMCNC: 31.3 GM/DL — LOW (ref 32–36)
MCHC RBC-ENTMCNC: 32.3 GM/DL — SIGNIFICANT CHANGE UP (ref 32–36)
MCV RBC AUTO: 78.5 FL — LOW (ref 80–100)
MCV RBC AUTO: 80.8 FL — SIGNIFICANT CHANGE UP (ref 80–100)
MONOCYTES # BLD AUTO: 0.67 K/UL — SIGNIFICANT CHANGE UP (ref 0–0.9)
MONOCYTES NFR BLD AUTO: 5.7 % — SIGNIFICANT CHANGE UP (ref 2–14)
NEUTROPHILS # BLD AUTO: 6.37 K/UL — SIGNIFICANT CHANGE UP (ref 1.8–7.4)
NEUTROPHILS NFR BLD AUTO: 54.2 % — SIGNIFICANT CHANGE UP (ref 43–77)
NRBC # BLD: 0 /100 WBCS — SIGNIFICANT CHANGE UP (ref 0–0)
NRBC # BLD: 0 /100 WBCS — SIGNIFICANT CHANGE UP (ref 0–0)
PLATELET # BLD AUTO: 282 K/UL — SIGNIFICANT CHANGE UP (ref 150–400)
PLATELET # BLD AUTO: 294 K/UL — SIGNIFICANT CHANGE UP (ref 150–400)
POTASSIUM SERPL-MCNC: 5 MMOL/L — SIGNIFICANT CHANGE UP (ref 3.5–5.3)
POTASSIUM SERPL-SCNC: 5 MMOL/L — SIGNIFICANT CHANGE UP (ref 3.5–5.3)
PROT SERPL-MCNC: 7.4 G/DL — SIGNIFICANT CHANGE UP (ref 6–8.3)
PROTHROM AB SERPL-ACNC: 10.6 SEC — SIGNIFICANT CHANGE UP (ref 10.6–13.6)
RBC # BLD: 4.63 M/UL — SIGNIFICANT CHANGE UP (ref 3.8–5.2)
RBC # BLD: 4.74 M/UL — SIGNIFICANT CHANGE UP (ref 3.8–5.2)
RBC # FLD: 14 % — SIGNIFICANT CHANGE UP (ref 10.3–14.5)
RBC # FLD: 14.2 % — SIGNIFICANT CHANGE UP (ref 10.3–14.5)
SARS-COV-2 RNA SPEC QL NAA+PROBE: SIGNIFICANT CHANGE UP
SODIUM SERPL-SCNC: 134 MMOL/L — LOW (ref 135–145)
WBC # BLD: 11.75 K/UL — HIGH (ref 3.8–10.5)
WBC # BLD: 13.08 K/UL — HIGH (ref 3.8–10.5)
WBC # FLD AUTO: 11.75 K/UL — HIGH (ref 3.8–10.5)
WBC # FLD AUTO: 13.08 K/UL — HIGH (ref 3.8–10.5)

## 2021-05-19 PROCEDURE — 93931 UPPER EXTREMITY STUDY: CPT | Mod: 26,LT

## 2021-05-19 PROCEDURE — 99285 EMERGENCY DEPT VISIT HI MDM: CPT

## 2021-05-19 PROCEDURE — 70498 CT ANGIOGRAPHY NECK: CPT | Mod: 26,MA

## 2021-05-19 PROCEDURE — 70496 CT ANGIOGRAPHY HEAD: CPT | Mod: 26,MA

## 2021-05-19 RX ORDER — SODIUM CHLORIDE 9 MG/ML
1000 INJECTION, SOLUTION INTRAVENOUS
Refills: 0 | Status: DISCONTINUED | OUTPATIENT
Start: 2021-05-19 | End: 2021-05-23

## 2021-05-19 RX ORDER — INSULIN LISPRO 100/ML
VIAL (ML) SUBCUTANEOUS AT BEDTIME
Refills: 0 | Status: DISCONTINUED | OUTPATIENT
Start: 2021-05-19 | End: 2021-05-23

## 2021-05-19 RX ORDER — ESOMEPRAZOLE MAGNESIUM 40 MG/1
1 CAPSULE, DELAYED RELEASE ORAL
Qty: 0 | Refills: 0 | DISCHARGE

## 2021-05-19 RX ORDER — ASPIRIN/CALCIUM CARB/MAGNESIUM 324 MG
81 TABLET ORAL
Qty: 0 | Refills: 0 | DISCHARGE

## 2021-05-19 RX ORDER — INSULIN LISPRO 100/ML
VIAL (ML) SUBCUTANEOUS
Refills: 0 | Status: DISCONTINUED | OUTPATIENT
Start: 2021-05-19 | End: 2021-05-23

## 2021-05-19 RX ORDER — DEXTROSE 50 % IN WATER 50 %
15 SYRINGE (ML) INTRAVENOUS ONCE
Refills: 0 | Status: DISCONTINUED | OUTPATIENT
Start: 2021-05-19 | End: 2021-05-23

## 2021-05-19 RX ORDER — HEPARIN SODIUM 5000 [USP'U]/ML
3000 INJECTION INTRAVENOUS; SUBCUTANEOUS EVERY 6 HOURS
Refills: 0 | Status: DISCONTINUED | OUTPATIENT
Start: 2021-05-19 | End: 2021-05-21

## 2021-05-19 RX ORDER — GLUCAGON INJECTION, SOLUTION 0.5 MG/.1ML
1 INJECTION, SOLUTION SUBCUTANEOUS ONCE
Refills: 0 | Status: DISCONTINUED | OUTPATIENT
Start: 2021-05-19 | End: 2021-05-23

## 2021-05-19 RX ORDER — DEXTROSE 50 % IN WATER 50 %
12.5 SYRINGE (ML) INTRAVENOUS ONCE
Refills: 0 | Status: DISCONTINUED | OUTPATIENT
Start: 2021-05-19 | End: 2021-05-23

## 2021-05-19 RX ORDER — AMLODIPINE BESYLATE 2.5 MG/1
2.5 TABLET ORAL DAILY
Refills: 0 | Status: DISCONTINUED | OUTPATIENT
Start: 2021-05-19 | End: 2021-05-23

## 2021-05-19 RX ORDER — HEPARIN SODIUM 5000 [USP'U]/ML
INJECTION INTRAVENOUS; SUBCUTANEOUS
Qty: 25000 | Refills: 0 | Status: DISCONTINUED | OUTPATIENT
Start: 2021-05-19 | End: 2021-05-21

## 2021-05-19 RX ORDER — ACETAMINOPHEN 500 MG
650 TABLET ORAL ONCE
Refills: 0 | Status: COMPLETED | OUTPATIENT
Start: 2021-05-19 | End: 2021-05-19

## 2021-05-19 RX ORDER — DEXTROSE 50 % IN WATER 50 %
25 SYRINGE (ML) INTRAVENOUS ONCE
Refills: 0 | Status: DISCONTINUED | OUTPATIENT
Start: 2021-05-19 | End: 2021-05-23

## 2021-05-19 RX ORDER — GLYBURIDE-METFORMIN HYDROCHLORIDE 1.25; 25 MG/1; MG/1
1 TABLET ORAL
Qty: 0 | Refills: 0 | DISCHARGE

## 2021-05-19 RX ORDER — GABAPENTIN 400 MG/1
300 CAPSULE ORAL
Qty: 0 | Refills: 0 | DISCHARGE

## 2021-05-19 RX ORDER — METOPROLOL TARTRATE 50 MG
25 TABLET ORAL
Refills: 0 | Status: DISCONTINUED | OUTPATIENT
Start: 2021-05-19 | End: 2021-05-23

## 2021-05-19 RX ORDER — SITAGLIPTIN 50 MG/1
1 TABLET, FILM COATED ORAL
Qty: 0 | Refills: 0 | DISCHARGE

## 2021-05-19 RX ORDER — INSULIN GLARGINE 100 [IU]/ML
40 INJECTION, SOLUTION SUBCUTANEOUS AT BEDTIME
Refills: 0 | Status: DISCONTINUED | OUTPATIENT
Start: 2021-05-19 | End: 2021-05-20

## 2021-05-19 RX ORDER — ATORVASTATIN CALCIUM 80 MG/1
40 TABLET, FILM COATED ORAL AT BEDTIME
Refills: 0 | Status: DISCONTINUED | OUTPATIENT
Start: 2021-05-19 | End: 2021-05-23

## 2021-05-19 RX ORDER — HEPARIN SODIUM 5000 [USP'U]/ML
6500 INJECTION INTRAVENOUS; SUBCUTANEOUS EVERY 6 HOURS
Refills: 0 | Status: DISCONTINUED | OUTPATIENT
Start: 2021-05-19 | End: 2021-05-21

## 2021-05-19 RX ORDER — PANTOPRAZOLE SODIUM 20 MG/1
40 TABLET, DELAYED RELEASE ORAL
Refills: 0 | Status: DISCONTINUED | OUTPATIENT
Start: 2021-05-19 | End: 2021-05-23

## 2021-05-19 RX ORDER — ASPIRIN/CALCIUM CARB/MAGNESIUM 324 MG
81 TABLET ORAL DAILY
Refills: 0 | Status: DISCONTINUED | OUTPATIENT
Start: 2021-05-19 | End: 2021-05-19

## 2021-05-19 RX ORDER — GABAPENTIN 400 MG/1
300 CAPSULE ORAL
Refills: 0 | Status: DISCONTINUED | OUTPATIENT
Start: 2021-05-19 | End: 2021-05-23

## 2021-05-19 RX ORDER — HEPARIN SODIUM 5000 [USP'U]/ML
6500 INJECTION INTRAVENOUS; SUBCUTANEOUS ONCE
Refills: 0 | Status: COMPLETED | OUTPATIENT
Start: 2021-05-19 | End: 2021-05-19

## 2021-05-19 RX ORDER — CLOPIDOGREL BISULFATE 75 MG/1
75 TABLET, FILM COATED ORAL DAILY
Refills: 0 | Status: DISCONTINUED | OUTPATIENT
Start: 2021-05-19 | End: 2021-05-19

## 2021-05-19 RX ORDER — INSULIN DETEMIR 100/ML (3)
40 INSULIN PEN (ML) SUBCUTANEOUS DAILY
Refills: 0 | Status: DISCONTINUED | OUTPATIENT
Start: 2021-05-19 | End: 2021-05-19

## 2021-05-19 RX ADMIN — HEPARIN SODIUM 1300 UNIT(S)/HR: 5000 INJECTION INTRAVENOUS; SUBCUTANEOUS at 21:36

## 2021-05-19 RX ADMIN — Medication 4: at 19:01

## 2021-05-19 RX ADMIN — Medication 650 MILLIGRAM(S): at 06:08

## 2021-05-19 RX ADMIN — AMLODIPINE BESYLATE 2.5 MILLIGRAM(S): 2.5 TABLET ORAL at 17:32

## 2021-05-19 RX ADMIN — HEPARIN SODIUM 6500 UNIT(S): 5000 INJECTION INTRAVENOUS; SUBCUTANEOUS at 13:44

## 2021-05-19 RX ADMIN — HEPARIN SODIUM 1500 UNIT(S)/HR: 5000 INJECTION INTRAVENOUS; SUBCUTANEOUS at 13:45

## 2021-05-19 RX ADMIN — Medication 81 MILLIGRAM(S): at 17:32

## 2021-05-19 RX ADMIN — INSULIN GLARGINE 40 UNIT(S): 100 INJECTION, SOLUTION SUBCUTANEOUS at 22:29

## 2021-05-19 RX ADMIN — Medication 25 MILLIGRAM(S): at 17:32

## 2021-05-19 RX ADMIN — Medication 3: at 22:29

## 2021-05-19 RX ADMIN — CLOPIDOGREL BISULFATE 75 MILLIGRAM(S): 75 TABLET, FILM COATED ORAL at 17:31

## 2021-05-19 RX ADMIN — ATORVASTATIN CALCIUM 40 MILLIGRAM(S): 80 TABLET, FILM COATED ORAL at 22:54

## 2021-05-19 RX ADMIN — GABAPENTIN 300 MILLIGRAM(S): 400 CAPSULE ORAL at 17:31

## 2021-05-19 NOTE — ED ADULT NURSE NOTE - NSSUHOSCREENINGYN_ED_ALL_ED
Detail Level: Detailed Add 98550 Cpt? (Important Note: In 2017 The Use Of 95166 Is Being Tracked By Cms To Determine Future Global Period Reimbursement For Global Periods): no wait time explained/warm blanket provided/plan of care explained Yes - the patient is able to be screened

## 2021-05-19 NOTE — ED PROVIDER NOTE - ATTENDING CONTRIBUTION TO CARE
Attending MD Appiah:  I personally have seen and examined this patient.  Resident note reviewed and agree on plan of care and except where noted.  See HPI, PE, and MDM for details.       Pt sp diagnostic cardiac cath 5 days prior presenting with left forearm pain, swelling and ecchymosis around radial cath access site. Radial pulses intact b/l, no motor or sensory deficits LUE. +hematoma and ttp about access site. Concern for pseudoaneurysm so will obtain US to evaluate

## 2021-05-19 NOTE — H&P ADULT - NSHPLABSRESULTS_GEN_ALL_CORE
LABS:                        11.7   11.75 )-----------( 282      ( 19 May 2021 06:39 )             37.4     05-19    134<L>  |  98  |  21  ----------------------------<  226<H>  5.0   |  22  |  0.68    Ca    9.9      19 May 2021 06:39    TPro  7.4  /  Alb  4.1  /  TBili  0.3  /  DBili  x   /  AST  21  /  ALT  39  /  AlkPhos  75  05-19    PT/INR - ( 19 May 2021 06:39 )   PT: 10.6 sec;   INR: 0.88 ratio         PTT - ( 19 May 2021 06:39 )  PTT:29.3 sec          RADIOLOGY & ADDITIONAL TESTS:

## 2021-05-19 NOTE — ED PROVIDER NOTE - PHYSICAL EXAMINATION
GENERAL: NAD, lying in bed comfortably  HEAD:  Atraumatic, normocephalic  EYES: EOMI, PERRLA, conjunctiva and sclera clear  ENT: Moist mucous membranes  HEART: Regular rate and rhythm, no murmurs, rubs, or gallops  LUNGS: Unlabored respirations.  Clear to auscultation bilaterally, no crackles, wheezing, or rhonchi  ABDOMEN: Soft, nontender, nondistended, +BS  EXTREMITIES: LUE - moderate TTP at distal forearm, 2+ radial and ulnar pulses, sensation intact, no cyanosis or edema  NERVOUS SYSTEM:  A&Ox3, no focal deficits   SKIN: No rashes or lesions

## 2021-05-19 NOTE — CONSULT NOTE ADULT - ASSESSMENT
67F w/ PMHx of HTN, HLD, and DM who presents with left arm swelling and pain over the past 6 days. Patient had recently undergone a LHC with left radial artery access at [facility]. Since the procedure, patient had experienced increased swelling and pain at the distal ventral forearm at the site of arterial access. Due to worsening pain and swelling, patient decided to visit [facility] ED for evaluation. In the Ed, patient was afebrile, hemodynamically stable, labs largely unremarkable, Duplex of left arm was significant for left radial artery thrombosis, no signs of PSA. Vascular surgery consulted for further evaluation.    PLAN:  - Given evidence of thrombosis of left radial artery, patient will benefit from therapeutic anticoagulation with continuous heparinization.  - No vascular surgical intervention warranted at this time.    Discussed with vascular surgery fellow Dr. Velasco.    DIANE Nolen, PGY-2   [facility]   Vascular Surgery   [id number]   67F w/ PMHx of HTN, HLD, and DM who presents with left arm swelling and pain over the past 6 days. Patient had recently undergone a LHC with left radial artery access at [facility]. Since the procedure, patient had experienced increased swelling and pain at the distal ventral forearm at the site of arterial access. Due to worsening pain and swelling, patient decided to visit [facility] ED for evaluation. In the Ed, patient was afebrile, hemodynamically stable, labs largely unremarkable, Duplex of left arm was significant for left radial artery thrombosis, no signs of PSA. Vascular surgery consulted for further evaluation.    PLAN:  - Given evidence of thrombosis of left radial artery, patient will benefit from therapeutic anticoagulation with continuous heparinization.  - No vascular surgical intervention warranted at this time.    Discussed with vascular surgery fellow Dr. Velasco.    DIANE Nolen, PGY-2   [facility]   Vascular Surgery   [id number]      ATTENDING STATEMENT :   Full consult note as above; discussed with surgery resident and vascular fellow.   She has pain in her LT forearm. She had a cardiac Cath via the radial artery. There is an occlusion of a short section of the radial artery.  Her hand is warm and viable. There is actually a pulse in the radial artery distally. She has weakness of motion but no clear motor sensory loss.  I do not see a strong reason for her to be kept on anticoagulation. This may be increasing any swelling in the area of the occlusion. I agree with her getting a CT scan of the arm and hand surgery involvement/ to ensure there is no nerve compression.

## 2021-05-19 NOTE — H&P ADULT - ASSESSMENT
65 y/o female PMH DM2, hyperlipidemia, HTN p/w worsening L forearm pain left since Thursday post angiogram. Pain radiates from L wrist to elbow, worse with palpation. Pt states she had an angiogram at [facility] on Friday 5/14 w/ Dr. Lisette Santillan that revealed only 30% stenosis with no intervention. Pt also endorses mild epigastric burning but otherwise denies fever, chills, CP, SOB, N/V, numbness, tingling, weakness of the extremity  67 y/o female PMH DM2, hyperlipidemia, HTN p/w worsening L forearm pain left since Thursday post angiogram. Pain radiates from L wrist to elbow, worse with palpation. Pt states she had an angiogram at [facility] on Friday 5/14 w/ Dr. Lisette Santillan that revealed only 30% stenosis with no intervention. Pt also endorses mild epigastric burning but otherwise denies fever, chills, CP, SOB, N/V, numbness, tingling, weakness of the extremity     thrombosis of left radial artery  - IV heparin per vascular  - ? change to oral a/c    unsteady gait and difficulty ambulating since the cardiac cath. Exam non focal.   - seen by neuro  - CTH negative  - CTA h/n R. P1 severe stenosis, otherwise no stenoocclusive disease.   - r/o stroke, may have had iatrogenic stroke during cath.    - c/w asa 81mg daily, would add plavix 75mg daily as well given significant PVD  - lipitor 40mg daily  - MRI brain w/o  - PT/OT  - A1c and lipids    diabetes  - fs qid  - hgb a1c  - insulin ss  - hold oral antidiabetic meds  - diabetic diet    HTN  - c/w home meds

## 2021-05-19 NOTE — CONSULT NOTE ADULT - ASSESSMENT
66F w/ PMH of DM, HLD, HTN, presents w/ CC of L. forearm pain since cardiac angiogram on Thursday. Neurology consulted for difficulty ambulating since the cardiac cath.     CTH negative  CTA h/n R. P1 severe stenosis, other no stenoocclusive disease.     Impression: Gait dysfunction of unclear localization and etiology. Possibilities include MSK related vs. small midline cerebellar infarct.     Plan:   [] No neurological contraindication to discharge  [] Will need MRI brain w/o contrast inpatient vs. outpatient  [] f/u with Dr. Slim Tatum at [phone] 66F w/ PMH of DM, HLD, HTN, presents w/ CC of L. forearm pain since cardiac angiogram on Thursday. Neurology consulted for difficulty ambulating since the cardiac cath.     CTH negative  CTA h/n R. P1 severe stenosis, other no stenoocclusive disease.     Impression: Gait dysfunction of unclear localization and etiology. Possibilities include MSK related vs. small midline cerebellar infarct.     Plan:   [] ASA 81mg   [] No neurological contraindication to discharge  [] Will need MRI brain w/o contrast inpatient vs. outpatient  [] f/u with Dr. Slim Tatum at [phone] 66F w/ PMH of DM, HLD, HTN, presents w/ CC of L. forearm pain since cardiac angiogram on Thursday. Neurology consulted for difficulty ambulating since the cardiac cath. Exam non focal.     CTH negative  CTA h/n R. P1 severe stenosis, otherwise no stenoocclusive disease.     Impression: Gait dysfunction with ?? Heal to shin ataxia of unclear localization and etiology. Possibilities include MSK vs. small midline cerebellar infarct.     Plan:   [] Continue ASA 81mg PO   [] Statin per ASCVD 10 year calculator   [] Normotension   [] No neurological contraindication to discharge  [] Will need MRI brain w/o contrast inpatient vs. outpatient  [] f/u with Dr. Slim Tatum at [phone]

## 2021-05-19 NOTE — ED ADULT NURSE NOTE - OBJECTIVE STATEMENT
65yo female presents with left arm pain since angiogram (left radial) on Friday. Pain was more intense on Sunday and pt reports falling on the ground because of the pain. Pt c/o mild epigastric pain/burning and intermittent SOB. Pt A&Ox3. COMER. Ambulatory. Denies cp. Respirations even/unlabored. C/o nausea without vomiting. Denies urinary symptoms. Skin WDI. +pulse in left radial with ttp.

## 2021-05-19 NOTE — ED PROVIDER NOTE - OBJECTIVE STATEMENT
67 y/o female PMH DM2, hyperlipidemia, HTN p/w worsening L forearm pain left since Thursday post angiogram. Pain radiates from L wrist to elbow, worse with palpation. Pt states she had an angiogram at [facility] on Thursday 5/4 w/ Dr. Lisette Santillan that revealed only 30% stenosis with no intervention. Pt also endorses mild epigastric burning but otherwise denies fever, chills, CP, SOB, N/V, numbness, tingling, weakness of the extremity 65 y/o female PMH DM2, hyperlipidemia, HTN p/w worsening L forearm pain left since Thursday post angiogram. Pain radiates from L wrist to elbow, worse with palpation. Pt states she had an angiogram at [facility] on Friday 5/14 w/ Dr. Lisette Santillan that revealed only 30% stenosis with no intervention. Pt also endorses mild epigastric burning but otherwise denies fever, chills, CP, SOB, N/V, numbness, tingling, weakness of the extremity
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
## 2021-05-19 NOTE — H&P ADULT - NEGATIVE GENERAL SYMPTOMS
CRITICAL CARE INTERDISCIPLINARY ROUNDS      Patient Information:    Name:   Aquilino Young    Age:   66 y.o.     Admission Date:   7/27/2017    Readmit Risk Assessment Information:      Readmit Risk Tool Support Systems: Family member(s), Relationship with Primary Physician Group: Seen at least one time within past 12 months    Surgery Date:      Day of Stay:     Expected Discharge Date:        Attending Provider:   Deric Clark MD    Surgeon:        Consultant:       Primary Care Provider:   Guadalupe Bell MD    Problem List:     Patient Active Problem List   Diagnosis Code    Anemia D64.9    Acidosis E87.2    Cardiac arrest ([address] I46.9    Respiratory failure ([address] J96.90    ESRD needing dialysis ([facility] N18.6, Z99.2    Anoxic brain damage (HCC) G93.1    Colitis K52.9    Hypotension I95.9    Acute GI bleeding K92.2    ESRD (end stage renal disease) ([facility] N18.6    Sepsis ([address] A41.9    Oropharyngeal dysphagia R13.12    Cachexia ([address] R64       Principal Problem:  <principal problem not specified>    Procedure:       During rounds the following quality care indicators and evidence based practices were addressed :     DVT Prophylaxis, Pressure Injury Prevention, Pain Management, Sepsis resuscitation and management, Nutritional Status, Critical Care Interventions Airways, Dialysis, Drains, Lines and Pressors and IHI Bundles: Vent Bundle Followed, Vent Day 30           Acute MI/PCI:   Not applicable    Heart Failure:    Not applicable    Cardiac Surgery:  Not applicable    SCIP Measures for other Surgeries:   Not applicable    Pneumonia:    Appropriate Antibiotic Selection (ICU versus Non-ICU)    Stroke:  Patient's Personal Risk Factors for Stroke are:   hypertension, family history, hyperlipidemia or diabetes mellitus    NIH Stroke Score  Total: 8 (08/17/17 0400)    Transfer Level of Care:  Not Ready for Transfer    The patient will require the following interventions based on the Readmission Risk Assessment:  Pharmacy evaluation and teaching, Care Management involvement for home health follow up for:  mobility and assistance with ADL's, Palliative Care evaluation and Spiritual Care evaluation      Discharge Management:  Home    Anticipated Discharge Date:  5      Interdisciplinary team rounds were held  with the following team membersCare Management, Diabetes Treatment Specialist, Nursing, Nutrition, Pastoral Care, Pharmacy, Physician and Clinical Coordinator and the  patient and healthcare POA (documentation required). Plan of care discussed. See clinical pathway and/or care plan for interventions and desired outcomes. Working on placement of patient. Dialysis today. no fever/no chills/no sweating/no anorexia/no weight loss/no weight gain/no polyphagia/no polyuria/no polydipsia/no malaise/no fatigue
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
## 2021-05-19 NOTE — ED ADULT NURSE REASSESSMENT NOTE - NS ED NURSE REASSESS COMMENT FT1
Report received from DUNIA Mccormick in purple. A&Ox3. Breathing spontaneously and unlabored on Room air. Pt safety maintained. Call bell within reach. Side rails in upward position. Awaiting US,

## 2021-05-19 NOTE — H&P ADULT - HISTORY OF PRESENT ILLNESS
67 y/o female PMH DM2, hyperlipidemia, HTN p/w worsening L forearm pain left since Thursday post angiogram. Pain radiates from L wrist to elbow, worse with palpation. Pt states she had an angiogram at [facility] on Friday 5/14 w/ Dr. Lisette Santillan that revealed only 30% stenosis with no intervention. Pt also endorses mild epigastric burning but otherwise denies fever, chills, CP, SOB, N/V, numbness, tingling, weakness of the extremity
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
## 2021-05-19 NOTE — ED PROVIDER NOTE - NS ED ROS FT
CONSTITUTIONAL: No fevers or chills  EYES/ENT: No visual changes  RESPIRATORY: No cough or shortness of breath  CARDIOVASCULAR: No chest pain or palpitations  GASTROINTESTINAL: (+) epigastric pain. No nausea, vomiting, or diarrhea  GENITOURINARY: No dysuria, frequency or hematuria  MUSCULOSKELETAL: (+) arm pain  NEUROLOGICAL: No numbness, tingling, or weakness  SKIN: No itching, rashes

## 2021-05-19 NOTE — ED PROVIDER NOTE - CLINICAL SUMMARY MEDICAL DECISION MAKING FREE TEXT BOX
67 y/o female PMH DM2, hyperlipidemia, HTN p/w worsening L forearm pain radiating to elbow since Thursday post angiogram. Physical exam moderate TTP at distal forearm, 2+ radial and ulnar pulses, sensation intact, no cyanosis or edema. Differential includies pseudoaneurysm vs thrombosis. Will get VA duplex arterial and reassess 65 y/o female PMH DM2, hyperlipidemia, HTN p/w worsening L forearm pain radiating to elbow since Friday 5/14 post angiogram. Physical exam moderate TTP at distal forearm, 2+ radial and ulnar pulses, sensation intact, no cyanosis or edema. Differential includies pseudoaneurysm vs thrombosis. Will get VA duplex arterial and reassess

## 2021-05-19 NOTE — CHART NOTE - NSCHARTNOTEFT_GEN_A_CORE
Patient found to have L. radial artery thrombosis and was placed on heparin drip.    From neurovascular standpoint, no need for ASA or Plavix if patient is on heparin. No neurovascular contraindication to continue antiplatelets if needed from medical/cardiovascular standpoint. Can resume ASA 81mg once patient no longer requiring anticoagulation. Patient found to have L. radial artery thrombosis and was placed on heparin drip.    From neurovascular standpoint, no need for ASA or Plavix if patient is on heparin. No neurovascular contraindication to continue antiplatelets if needed from medical/cardiovascular standpoint. Can resume ASA 81mg once patient no longer requiring anticoagulation.    Please contact patients cardiologist to ask whether patient needs to be on antiplatet therapy while on heparin drip.

## 2021-05-20 ENCOUNTER — TRANSCRIPTION ENCOUNTER (OUTPATIENT)
Age: 66
End: 2021-05-20

## 2021-05-20 DIAGNOSIS — I82.602 ACUTE EMBOLISM AND THROMBOSIS OF UNSPECIFIED VEINS OF LEFT UPPER EXTREMITY: ICD-10-CM

## 2021-05-20 DIAGNOSIS — E04.1 NONTOXIC SINGLE THYROID NODULE: ICD-10-CM

## 2021-05-20 DIAGNOSIS — E11.9 TYPE 2 DIABETES MELLITUS WITHOUT COMPLICATIONS: ICD-10-CM

## 2021-05-20 DIAGNOSIS — E66.9 OBESITY, UNSPECIFIED: ICD-10-CM

## 2021-05-20 LAB
A1C WITH ESTIMATED AVERAGE GLUCOSE RESULT: 10.4 % — HIGH (ref 4–5.6)
ANION GAP SERPL CALC-SCNC: 16 MMOL/L — SIGNIFICANT CHANGE UP (ref 5–17)
APTT BLD: 104.5 SEC — HIGH (ref 27.5–35.5)
APTT BLD: 66.2 SEC — HIGH (ref 27.5–35.5)
APTT BLD: 72.8 SEC — HIGH (ref 27.5–35.5)
BUN SERPL-MCNC: 17 MG/DL — SIGNIFICANT CHANGE UP (ref 7–23)
CALCIUM SERPL-MCNC: 9.4 MG/DL — SIGNIFICANT CHANGE UP (ref 8.4–10.5)
CHLORIDE SERPL-SCNC: 97 MMOL/L — SIGNIFICANT CHANGE UP (ref 96–108)
CHOLEST SERPL-MCNC: 178 MG/DL — SIGNIFICANT CHANGE UP
CO2 SERPL-SCNC: 21 MMOL/L — LOW (ref 22–31)
COVID-19 SPIKE DOMAIN AB INTERP: POSITIVE
COVID-19 SPIKE DOMAIN ANTIBODY RESULT: >250 U/ML — HIGH
CREAT SERPL-MCNC: 0.63 MG/DL — SIGNIFICANT CHANGE UP (ref 0.5–1.3)
ESTIMATED AVERAGE GLUCOSE: 252 MG/DL — HIGH (ref 68–114)
FOLATE SERPL-MCNC: >20 NG/ML — SIGNIFICANT CHANGE UP
GLUCOSE BLDC GLUCOMTR-MCNC: 252 MG/DL — HIGH (ref 70–99)
GLUCOSE BLDC GLUCOMTR-MCNC: 269 MG/DL — HIGH (ref 70–99)
GLUCOSE BLDC GLUCOMTR-MCNC: 325 MG/DL — HIGH (ref 70–99)
GLUCOSE BLDC GLUCOMTR-MCNC: 332 MG/DL — HIGH (ref 70–99)
GLUCOSE SERPL-MCNC: 251 MG/DL — HIGH (ref 70–99)
HCT VFR BLD CALC: 37.8 % — SIGNIFICANT CHANGE UP (ref 34.5–45)
HCV AB S/CO SERPL IA: 0.12 S/CO — SIGNIFICANT CHANGE UP (ref 0–0.99)
HCV AB SERPL-IMP: SIGNIFICANT CHANGE UP
HDLC SERPL-MCNC: 36 MG/DL — LOW
HGB BLD-MCNC: 12.2 G/DL — SIGNIFICANT CHANGE UP (ref 11.5–15.5)
LIPID PNL WITH DIRECT LDL SERPL: 79 MG/DL — SIGNIFICANT CHANGE UP
MAGNESIUM SERPL-MCNC: 1.8 MG/DL — SIGNIFICANT CHANGE UP (ref 1.6–2.6)
MCHC RBC-ENTMCNC: 25.6 PG — LOW (ref 27–34)
MCHC RBC-ENTMCNC: 32.3 GM/DL — SIGNIFICANT CHANGE UP (ref 32–36)
MCV RBC AUTO: 79.4 FL — LOW (ref 80–100)
NON HDL CHOLESTEROL: 142 MG/DL — HIGH
NRBC # BLD: 0 /100 WBCS — SIGNIFICANT CHANGE UP (ref 0–0)
PHOSPHATE SERPL-MCNC: 3.7 MG/DL — SIGNIFICANT CHANGE UP (ref 2.5–4.5)
PLATELET # BLD AUTO: 303 K/UL — SIGNIFICANT CHANGE UP (ref 150–400)
POTASSIUM SERPL-MCNC: 4.3 MMOL/L — SIGNIFICANT CHANGE UP (ref 3.5–5.3)
POTASSIUM SERPL-SCNC: 4.3 MMOL/L — SIGNIFICANT CHANGE UP (ref 3.5–5.3)
RBC # BLD: 4.76 M/UL — SIGNIFICANT CHANGE UP (ref 3.8–5.2)
RBC # FLD: 13.9 % — SIGNIFICANT CHANGE UP (ref 10.3–14.5)
SARS-COV-2 IGG+IGM SERPL QL IA: >250 U/ML — HIGH
SARS-COV-2 IGG+IGM SERPL QL IA: POSITIVE
SODIUM SERPL-SCNC: 134 MMOL/L — LOW (ref 135–145)
TRIGL SERPL-MCNC: 316 MG/DL — HIGH
TSH SERPL-MCNC: 2.67 UIU/ML — SIGNIFICANT CHANGE UP (ref 0.27–4.2)
VIT B12 SERPL-MCNC: 712 PG/ML — SIGNIFICANT CHANGE UP (ref 232–1245)
WBC # BLD: 10.36 K/UL — SIGNIFICANT CHANGE UP (ref 3.8–10.5)
WBC # FLD AUTO: 10.36 K/UL — SIGNIFICANT CHANGE UP (ref 3.8–10.5)

## 2021-05-20 RX ORDER — ACETAMINOPHEN 500 MG
650 TABLET ORAL ONCE
Refills: 0 | Status: COMPLETED | OUTPATIENT
Start: 2021-05-20 | End: 2021-05-20

## 2021-05-20 RX ORDER — INSULIN GLARGINE 100 [IU]/ML
46 INJECTION, SOLUTION SUBCUTANEOUS AT BEDTIME
Refills: 0 | Status: DISCONTINUED | OUTPATIENT
Start: 2021-05-20 | End: 2021-05-21

## 2021-05-20 RX ORDER — INSULIN LISPRO 100/ML
7 VIAL (ML) SUBCUTANEOUS
Refills: 0 | Status: DISCONTINUED | OUTPATIENT
Start: 2021-05-20 | End: 2021-05-21

## 2021-05-20 RX ADMIN — Medication 3: at 08:16

## 2021-05-20 RX ADMIN — ATORVASTATIN CALCIUM 40 MILLIGRAM(S): 80 TABLET, FILM COATED ORAL at 22:06

## 2021-05-20 RX ADMIN — Medication 7 UNIT(S): at 17:11

## 2021-05-20 RX ADMIN — AMLODIPINE BESYLATE 2.5 MILLIGRAM(S): 2.5 TABLET ORAL at 05:42

## 2021-05-20 RX ADMIN — GABAPENTIN 300 MILLIGRAM(S): 400 CAPSULE ORAL at 05:42

## 2021-05-20 RX ADMIN — Medication 650 MILLIGRAM(S): at 03:59

## 2021-05-20 RX ADMIN — Medication 25 MILLIGRAM(S): at 17:11

## 2021-05-20 RX ADMIN — HEPARIN SODIUM 1100 UNIT(S)/HR: 5000 INJECTION INTRAVENOUS; SUBCUTANEOUS at 21:59

## 2021-05-20 RX ADMIN — Medication 4: at 17:11

## 2021-05-20 RX ADMIN — GABAPENTIN 300 MILLIGRAM(S): 400 CAPSULE ORAL at 17:11

## 2021-05-20 RX ADMIN — HEPARIN SODIUM 1100 UNIT(S)/HR: 5000 INJECTION INTRAVENOUS; SUBCUTANEOUS at 05:26

## 2021-05-20 RX ADMIN — Medication 25 MILLIGRAM(S): at 05:42

## 2021-05-20 RX ADMIN — INSULIN GLARGINE 46 UNIT(S): 100 INJECTION, SOLUTION SUBCUTANEOUS at 22:06

## 2021-05-20 RX ADMIN — Medication 650 MILLIGRAM(S): at 12:22

## 2021-05-20 RX ADMIN — Medication 650 MILLIGRAM(S): at 04:30

## 2021-05-20 RX ADMIN — Medication 3: at 12:21

## 2021-05-20 RX ADMIN — Medication 2: at 22:06

## 2021-05-20 RX ADMIN — HEPARIN SODIUM 1100 UNIT(S)/HR: 5000 INJECTION INTRAVENOUS; SUBCUTANEOUS at 12:57

## 2021-05-20 RX ADMIN — PANTOPRAZOLE SODIUM 40 MILLIGRAM(S): 20 TABLET, DELAYED RELEASE ORAL at 05:42

## 2021-05-20 NOTE — PROGRESS NOTE ADULT - ASSESSMENT
67F w/ PMHx of HTN, HLD, and DM who presents with left arm swelling and pain over the past 6 days. Patient had recently undergone a LHC with left radial artery access at [facility]. Since the procedure, patient had experienced increased swelling and pain at the distal ventral forearm at the site of arterial access. Due to worsening pain and swelling, patient decided to visit [facility] ED for evaluation. In the Ed, patient was afebrile, hemodynamically stable, labs largely unremarkable, Duplex of left arm was significant for left radial artery thrombosis, no signs of PSA. Vascular surgery consulted for further evaluation.    PLAN:  - C/W therapeutic AC  - No vascular surgical intervention warranted at this time.    DIANE Nolen, PGY-2   [facility]   Vascular Surgery   [id number]

## 2021-05-20 NOTE — CONSULT NOTE ADULT - ASSESSMENT
Assessment  DMT2: 66y Female with DM T2 with hyperglycemia, A1C 10.4%, was on oral meds and insulin at home, now on basal insulin and coverage, blood sugars running high and not at target, no hypoglycemic episodes, eating meals, appears alert and comfortable.  Thyroid Nodule: Known to patient, she reports having biopsy done 2 years ago, thinks results were benign, has FU appointment in June. Thyroid US reviewed. Euthyroid, denies dysphagia or compressive sxs.  Radial Artery Thrombosis: on medications, monitored, FU Vascular.  Obesity: No strict exercise routines, not on any weight loss program, neither on low calorie diet.        Michelle Diana MD  Cell: [phone]  Office: [phone]     Assessment  DMT2: 66y Female with DM T2 with hyperglycemia, A1C 10.4%, was on oral meds and insulin at home, now on basal insulin and coverage,  blood sugars running high and not at target, no hypoglycemic episodes, eating meals, appears alert and comfortable.  Thyroid Nodule: Known to patient, she reports having biopsy done 2 years ago, thinks results were benign, has FU appointment in June. Thyroid US reviewed. Euthyroid, denies dysphagia or compressive sxs.  Radial Artery Thrombosis: on medications, monitored, FU Vascular.  Obesity: No strict exercise routines, not on any weight loss program, neither on low calorie diet.        Michelle Diana MD  Cell: [phone]  Office: [phone]

## 2021-05-20 NOTE — PHYSICAL THERAPY INITIAL EVALUATION ADULT - PERTINENT HX OF CURRENT PROBLEM, REHAB EVAL
Pt is a 67 y/o F PMH DM2, hyperlipidemia, HTN p/w worsening L forearm pain left since Thursday post angiogram. Pain radiates from L wrist to elbow, worse with palpation. Pt states she had an angiogram at [facility] on Friday 5/14 w/ Dr. Lisette Santillan that revealed only 30% stenosis with no intervention. Pt also endorses mild epigastric burning. Duplex of left arm was significant for left radial artery thrombosis CTH: Moderate focal stenosis of the P1 P2 junction of the right posterior cerebral artery.

## 2021-05-20 NOTE — OCCUPATIONAL THERAPY INITIAL EVALUATION ADULT - LIVES WITH, PROFILE
Pt reports living w/ spouse in 1st floor [address] with 0STE. Pt reports having HHA 4hrs/day since LBP started a few years ago. Aide assists with ADLs as needed. Pt has tub/shower, shower chair + grab bars./spouse
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
## 2021-05-20 NOTE — PHYSICAL THERAPY INITIAL EVALUATION ADULT - BALANCE TRAINING, PT EVAL
GOAL: Pt will improve balance during (static/dynamic) (sitting/standing) activites by atleast 1 balance grade within 3-4 weeks to assist with greater independence during functional mobility and ADL's.

## 2021-05-20 NOTE — PROGRESS NOTE ADULT - SUBJECTIVE AND OBJECTIVE BOX
Surgery Progress Note    SUBJECTIVE:  - Patient seen and examined at bedside   - Patient states   - Denies  --------------------------------------------------------------------------------------------------  OBJECTIVE:   Physical Exam:  General: AAOx3, NAD, lying comfortably in bed  HEENT: NC/AT  Respiratory: nonlabored breathing  Cardiovascular: RRR, normal S1 and S2, no murmurs or gallops  Abdomen: non-distended, soft, non-tender  Extremities: WWP, no edema  Drain:  Ostomy:   --------------------------------------------------------------------------------------------------  V/S:  Vital Signs Last 24 Hrs  T(C): 36.8 (20 May 2021 10:41), Max: 36.8 (20 May 2021 10:41)  T(F): 98.3 (20 May 2021 10:41), Max: 98.3 (20 May 2021 10:41)  HR: 103 (20 May 2021 12:43) (84 - 103)  BP: 155/90 (20 May 2021 12:43) (130/83 - 155/90)  BP(mean): --  RR: 18 (20 May 2021 10:41) (18 - 19)  SpO2: 97% (20 May 2021 12:43) (96% - 99%)    --------------------------------------------------------------------------------------------------  I/Os:    20 May 2021 07:01  -  20 May 2021 12:55  --------------------------------------------------------  IN:    Oral Fluid: 360 mL  Total IN: 360 mL    OUT:  Total OUT: 0 mL    Total NET: 360 mL        --------------------------------------------------------------------------------------------------  LABS:                        12.2   10.36 )-----------( 303      ( 20 May 2021 05:36 )             37.8     20 May 2021 05:36    134    |  97     |  17     ----------------------------<  251    4.3     |  21     |  0.63     Ca    9.4        20 May 2021 05:36  Phos  3.7       20 May 2021 05:36  Mg     1.8       20 May 2021 05:36    TPro  7.4    /  Alb  4.1    /  TBili  0.3    /  DBili  x      /  AST  21     /  ALT  39     /  AlkPhos  75     19 May 2021 06:39    PT/INR - ( 19 May 2021 06:39 )   PT: 10.6 sec;   INR: 0.88 ratio         PTT - ( 20 May 2021 12:20 )  PTT:72.8 sec  CAPILLARY BLOOD GLUCOSE      POCT Blood Glucose.: 252 mg/dL (20 May 2021 11:31)  POCT Blood Glucose.: 269 mg/dL (20 May 2021 07:36)  POCT Blood Glucose.: 368 mg/dL (19 May 2021 21:56)  POCT Blood Glucose.: 332 mg/dL (19 May 2021 18:58)        LIVER FUNCTIONS - ( 19 May 2021 06:39 )  Alb: 4.1 g/dL / Pro: 7.4 g/dL / ALK PHOS: 75 U/L / ALT: 39 U/L / AST: 21 U/L / GGT: x               --------------------------------------------------------------------------------------------------  MEDICATIONS  (STANDING):  amLODIPine   Tablet 2.5 milliGRAM(s) Oral daily  atorvastatin 40 milliGRAM(s) Oral at bedtime  dextrose 40% Gel 15 Gram(s) Oral once  dextrose 5%. 1000 milliLiter(s) (50 mL/Hr) IV Continuous <Continuous>  dextrose 5%. 1000 milliLiter(s) (100 mL/Hr) IV Continuous <Continuous>  dextrose 50% Injectable 25 Gram(s) IV Push once  dextrose 50% Injectable 12.5 Gram(s) IV Push once  dextrose 50% Injectable 25 Gram(s) IV Push once  gabapentin 300 milliGRAM(s) Oral two times a day  glucagon  Injectable 1 milliGRAM(s) IntraMuscular once  heparin  Infusion.  Unit(s)/Hr (15 mL/Hr) IV Continuous <Continuous>  insulin glargine Injectable (LANTUS) 40 Unit(s) SubCutaneous at bedtime  insulin lispro (ADMELOG) corrective regimen sliding scale   SubCutaneous three times a day before meals  insulin lispro (ADMELOG) corrective regimen sliding scale   SubCutaneous at bedtime  LORazepam     Tablet 0.5 milliGRAM(s) Oral once  metoprolol tartrate 25 milliGRAM(s) Oral two times a day  pantoprazole    Tablet 40 milliGRAM(s) Oral before breakfast    MEDICATIONS  (PRN):  heparin   Injectable 6500 Unit(s) IV Push every 6 hours PRN For aPTT less than 40  heparin   Injectable 3000 Unit(s) IV Push every 6 hours PRN For aPTT between 40 - 57    --------------------------------------------------------------------------------------------------

## 2021-05-20 NOTE — PHYSICAL THERAPY INITIAL EVALUATION ADULT - PASSIVE RANGE OF MOTION EXAMINATION, REHAB EVAL
L wrist/UE limited due to pain from thrombus/Right UE Passive ROM was WNL (within normal limits)/Left LE Passive ROM was WNL (within normal limits)/Right LE Passive ROM was WNL (within normal limits)

## 2021-05-20 NOTE — PROGRESS NOTE ADULT - ASSESSMENT
67 y/o female PMH DM2, hyperlipidemia, HTN p/w worsening L forearm pain left since Thursday post angiogram. Pain radiates from L wrist to elbow, worse with palpation. Pt states she had an angiogram at [facility] on Friday 5/14 w/ Dr. Lisette Santillan that revealed only 30% stenosis with no intervention. Pt also endorses mild epigastric burning but otherwise denies fever, chills, CP, SOB, N/V, numbness, tingling, weakness of the extremity     thrombosis of left radial artery  - IV heparin per vascular  - ? change to oral a/c    unsteady gait and difficulty ambulating since the cardiac cath. Exam non focal.   - seen by neuro  - CTH negative  - CTA h/n R. P1 severe stenosis, otherwise no stenoocclusive disease.   - r/o stroke, may have had iatrogenic stroke during cath.    - pt is on iv heparin  - lipitor 40mg daily  - MRI brain w/o  - PT/OT  - A1c and lipids    uncontrolled diabetes  - fs qid  - hgb a1c 10.4  - insulin ss  - hold oral antidiabetic meds  - diabetic diet  - endo consult    HTN  - c/w home meds

## 2021-05-20 NOTE — DISCHARGE NOTE NURSING/CASE MANAGEMENT/SOCIAL WORK - PATIENT PORTAL LINK FT
You can access the FollowMyHealth Patient Portal offered by [facility] by registering at the following website: [email or web address]. By joining [employer]’s FollowMyHealth portal, you will also be able to view your health information using other applications (apps) compatible with our system.

## 2021-05-20 NOTE — PROGRESS NOTE ADULT - SUBJECTIVE AND OBJECTIVE BOX
DATE OF SERVICE: 05-20-21 @ 12:59    Patient is a 66y old  Female who presents with a chief complaint of     SUBJECTIVE / OVERNIGHT EVENTS:  No chest pain. No shortness of breath. No complaints. No events overnight.     MEDICATIONS  (STANDING):  amLODIPine   Tablet 2.5 milliGRAM(s) Oral daily  atorvastatin 40 milliGRAM(s) Oral at bedtime  dextrose 40% Gel 15 Gram(s) Oral once  dextrose 5%. 1000 milliLiter(s) (50 mL/Hr) IV Continuous <Continuous>  dextrose 5%. 1000 milliLiter(s) (100 mL/Hr) IV Continuous <Continuous>  dextrose 50% Injectable 25 Gram(s) IV Push once  dextrose 50% Injectable 12.5 Gram(s) IV Push once  dextrose 50% Injectable 25 Gram(s) IV Push once  gabapentin 300 milliGRAM(s) Oral two times a day  glucagon  Injectable 1 milliGRAM(s) IntraMuscular once  heparin  Infusion.  Unit(s)/Hr (15 mL/Hr) IV Continuous <Continuous>  insulin glargine Injectable (LANTUS) 40 Unit(s) SubCutaneous at bedtime  insulin lispro (ADMELOG) corrective regimen sliding scale   SubCutaneous three times a day before meals  insulin lispro (ADMELOG) corrective regimen sliding scale   SubCutaneous at bedtime  LORazepam     Tablet 0.5 milliGRAM(s) Oral once  metoprolol tartrate 25 milliGRAM(s) Oral two times a day  pantoprazole    Tablet 40 milliGRAM(s) Oral before breakfast    MEDICATIONS  (PRN):  heparin   Injectable 6500 Unit(s) IV Push every 6 hours PRN For aPTT less than 40  heparin   Injectable 3000 Unit(s) IV Push every 6 hours PRN For aPTT between 40 - 57      Vital Signs Last 24 Hrs  T(C): 36.8 (20 May 2021 10:41), Max: 36.8 (20 May 2021 10:41)  T(F): 98.3 (20 May 2021 10:41), Max: 98.3 (20 May 2021 10:41)  HR: 103 (20 May 2021 12:43) (84 - 103)  BP: 155/90 (20 May 2021 12:43) (130/83 - 155/90)  BP(mean): --  RR: 18 (20 May 2021 10:41) (18 - 19)  SpO2: 97% (20 May 2021 12:43) (96% - 99%)  CAPILLARY BLOOD GLUCOSE      POCT Blood Glucose.: 252 mg/dL (20 May 2021 11:31)  POCT Blood Glucose.: 269 mg/dL (20 May 2021 07:36)  POCT Blood Glucose.: 368 mg/dL (19 May 2021 21:56)  POCT Blood Glucose.: 332 mg/dL (19 May 2021 18:58)    I&O's Summary    20 May 2021 07:01  -  20 May 2021 12:59  --------------------------------------------------------  IN: 360 mL / OUT: 0 mL / NET: 360 mL        PHYSICAL EXAM:  GENERAL: NAD, well-developed  HEAD:  Atraumatic, Normocephalic  EYES: EOMI, PERRLA, conjunctiva and sclera clear  NECK: Supple, No JVD  CHEST/LUNG: Clear to auscultation bilaterally; No wheeze  HEART: Regular rate and rhythm; No murmurs, rubs, or gallops  ABDOMEN: Soft, Nontender, Nondistended; Bowel sounds present  EXTREMITIES:  2+ Peripheral Pulses, No clubbing, cyanosis, or edema , left wrist is tender to touch  PSYCH: AAOx3  NEUROLOGY: non-focal  SKIN: No rashes or lesions    LABS:                        12.2   10.36 )-----------( 303      ( 20 May 2021 05:36 )             37.8     05-20    134<L>  |  97  |  17  ----------------------------<  251<H>  4.3   |  21<L>  |  0.63    Ca    9.4      20 May 2021 05:36  Phos  3.7     05-20  Mg     1.8     05-20    TPro  7.4  /  Alb  4.1  /  TBili  0.3  /  DBili  x   /  AST  21  /  ALT  39  /  AlkPhos  75  05-19    PT/INR - ( 19 May 2021 06:39 )   PT: 10.6 sec;   INR: 0.88 ratio         PTT - ( 20 May 2021 12:20 )  PTT:72.8 sec          RADIOLOGY & ADDITIONAL TESTS:    Imaging Personally Reviewed:    Consultant(s) Notes Reviewed:      Care Discussed with Consultants/Other Providers:

## 2021-05-20 NOTE — CONSULT NOTE ADULT - PROBLEM SELECTOR RECOMMENDATION 2
Patient needs monitoring of thyroid nodules, possible repeat bx.  She reports FU appointment with her ENT in June, will FU as outpatient.

## 2021-05-20 NOTE — PHYSICAL THERAPY INITIAL EVALUATION ADULT - PLANNED THERAPY INTERVENTIONS, PT EVAL
GOAL: Pt will perform 12 stairs with or without U HR as needed within 3-4weeks./balance training/bed mobility training/gait training/transfer training

## 2021-05-20 NOTE — OCCUPATIONAL THERAPY INITIAL EVALUATION ADULT - PRECAUTIONS/LIMITATIONS, REHAB EVAL
Pt also endorses mild epigastric burning but otherwise denies fever, chills, CP, SOB, N/V, numbness, tingling, weakness of the extremity. Patient found to have L. radial artery thrombosis and was placed on heparin drip. Head CT (5/19) : No acute intracranial hemorrhage, mass effect, or shift of the midline structures. CTA NECK: No large vessel occlusion or major stenosis. CTA HEAD: Moderate focal stenosis of the P1 P2 junction of the right posterior cerebral artery. Otherwise no large vessel occlusion or major stenosis. US Duplex LUE (5/19) Limited examination of the arteries of the left forearm. The left radial artery is occluded, thrombosed. There is no pseudoaneurysm or fistula

## 2021-05-20 NOTE — PHYSICAL THERAPY INITIAL EVALUATION ADULT - ADDITIONAL COMMENTS
As per pt, pt lives in an apartment w/ spouse and no steps to enter w/ UHR. PTA pt was independent w/ all mobility w/ straight cane and ADLs. Pt has HHA 4hrs a day 7 days a week

## 2021-05-20 NOTE — OCCUPATIONAL THERAPY INITIAL EVALUATION ADULT - PERTINENT HX OF CURRENT PROBLEM, REHAB EVAL
65 y/o female PMH DM2, hyperlipidemia, HTN p/w worsening L forearm pain left since Thursday post angiogram. Pain radiates from L wrist to elbow, worse with palpation. Pt states she had an angiogram at [facility] on Friday 5/14 w/ Dr. Lisette Santillan that revealed only 30% stenosis with no intervention.

## 2021-05-21 LAB
ANION GAP SERPL CALC-SCNC: 15 MMOL/L — SIGNIFICANT CHANGE UP (ref 5–17)
APTT BLD: 76.8 SEC — HIGH (ref 27.5–35.5)
BUN SERPL-MCNC: 15 MG/DL — SIGNIFICANT CHANGE UP (ref 7–23)
CALCIUM SERPL-MCNC: 9.5 MG/DL — SIGNIFICANT CHANGE UP (ref 8.4–10.5)
CHLORIDE SERPL-SCNC: 99 MMOL/L — SIGNIFICANT CHANGE UP (ref 96–108)
CO2 SERPL-SCNC: 22 MMOL/L — SIGNIFICANT CHANGE UP (ref 22–31)
CREAT SERPL-MCNC: 0.75 MG/DL — SIGNIFICANT CHANGE UP (ref 0.5–1.3)
GLUCOSE BLDC GLUCOMTR-MCNC: 264 MG/DL — HIGH (ref 70–99)
GLUCOSE BLDC GLUCOMTR-MCNC: 275 MG/DL — HIGH (ref 70–99)
GLUCOSE BLDC GLUCOMTR-MCNC: 287 MG/DL — HIGH (ref 70–99)
GLUCOSE BLDC GLUCOMTR-MCNC: 288 MG/DL — HIGH (ref 70–99)
GLUCOSE SERPL-MCNC: 243 MG/DL — HIGH (ref 70–99)
HCT VFR BLD CALC: 39.7 % — SIGNIFICANT CHANGE UP (ref 34.5–45)
HGB BLD-MCNC: 12.5 G/DL — SIGNIFICANT CHANGE UP (ref 11.5–15.5)
MCHC RBC-ENTMCNC: 25.2 PG — LOW (ref 27–34)
MCHC RBC-ENTMCNC: 31.5 GM/DL — LOW (ref 32–36)
MCV RBC AUTO: 79.9 FL — LOW (ref 80–100)
NRBC # BLD: 0 /100 WBCS — SIGNIFICANT CHANGE UP (ref 0–0)
PLATELET # BLD AUTO: 285 K/UL — SIGNIFICANT CHANGE UP (ref 150–400)
POTASSIUM SERPL-MCNC: 4.3 MMOL/L — SIGNIFICANT CHANGE UP (ref 3.5–5.3)
POTASSIUM SERPL-SCNC: 4.3 MMOL/L — SIGNIFICANT CHANGE UP (ref 3.5–5.3)
RBC # BLD: 4.97 M/UL — SIGNIFICANT CHANGE UP (ref 3.8–5.2)
RBC # FLD: 14.1 % — SIGNIFICANT CHANGE UP (ref 10.3–14.5)
SODIUM SERPL-SCNC: 136 MMOL/L — SIGNIFICANT CHANGE UP (ref 135–145)
WBC # BLD: 9.06 K/UL — SIGNIFICANT CHANGE UP (ref 3.8–10.5)
WBC # FLD AUTO: 9.06 K/UL — SIGNIFICANT CHANGE UP (ref 3.8–10.5)

## 2021-05-21 PROCEDURE — 70551 MRI BRAIN STEM W/O DYE: CPT | Mod: 26

## 2021-05-21 PROCEDURE — 73206 CT ANGIO UPR EXTRM W/O&W/DYE: CPT | Mod: 26,LT

## 2021-05-21 RX ORDER — INSULIN GLARGINE 100 [IU]/ML
52 INJECTION, SOLUTION SUBCUTANEOUS AT BEDTIME
Refills: 0 | Status: DISCONTINUED | OUTPATIENT
Start: 2021-05-21 | End: 2021-05-22

## 2021-05-21 RX ORDER — CEFAZOLIN SODIUM 1 G
1000 VIAL (EA) INJECTION EVERY 8 HOURS
Refills: 0 | Status: DISCONTINUED | OUTPATIENT
Start: 2021-05-21 | End: 2021-05-23

## 2021-05-21 RX ORDER — CEFAZOLIN SODIUM 1 G
VIAL (EA) INJECTION
Refills: 0 | Status: DISCONTINUED | OUTPATIENT
Start: 2021-05-21 | End: 2021-05-23

## 2021-05-21 RX ORDER — INSULIN LISPRO 100/ML
10 VIAL (ML) SUBCUTANEOUS
Refills: 0 | Status: DISCONTINUED | OUTPATIENT
Start: 2021-05-21 | End: 2021-05-22

## 2021-05-21 RX ORDER — CEFAZOLIN SODIUM 1 G
1000 VIAL (EA) INJECTION ONCE
Refills: 0 | Status: COMPLETED | OUTPATIENT
Start: 2021-05-21 | End: 2021-05-21

## 2021-05-21 RX ADMIN — Medication 3: at 17:16

## 2021-05-21 RX ADMIN — Medication 1: at 21:57

## 2021-05-21 RX ADMIN — PANTOPRAZOLE SODIUM 40 MILLIGRAM(S): 20 TABLET, DELAYED RELEASE ORAL at 05:11

## 2021-05-21 RX ADMIN — AMLODIPINE BESYLATE 2.5 MILLIGRAM(S): 2.5 TABLET ORAL at 05:11

## 2021-05-21 RX ADMIN — Medication 3: at 08:10

## 2021-05-21 RX ADMIN — Medication 3: at 12:15

## 2021-05-21 RX ADMIN — ATORVASTATIN CALCIUM 40 MILLIGRAM(S): 80 TABLET, FILM COATED ORAL at 21:57

## 2021-05-21 RX ADMIN — Medication 100 MILLIGRAM(S): at 16:00

## 2021-05-21 RX ADMIN — Medication 7 UNIT(S): at 08:10

## 2021-05-21 RX ADMIN — GABAPENTIN 300 MILLIGRAM(S): 400 CAPSULE ORAL at 17:17

## 2021-05-21 RX ADMIN — HEPARIN SODIUM 1100 UNIT(S)/HR: 5000 INJECTION INTRAVENOUS; SUBCUTANEOUS at 10:54

## 2021-05-21 RX ADMIN — Medication 10 UNIT(S): at 12:15

## 2021-05-21 RX ADMIN — Medication 100 MILLIGRAM(S): at 21:57

## 2021-05-21 RX ADMIN — INSULIN GLARGINE 52 UNIT(S): 100 INJECTION, SOLUTION SUBCUTANEOUS at 21:57

## 2021-05-21 RX ADMIN — Medication 10 UNIT(S): at 17:17

## 2021-05-21 RX ADMIN — GABAPENTIN 300 MILLIGRAM(S): 400 CAPSULE ORAL at 05:11

## 2021-05-21 RX ADMIN — Medication 25 MILLIGRAM(S): at 05:11

## 2021-05-21 RX ADMIN — Medication 25 MILLIGRAM(S): at 17:17

## 2021-05-21 NOTE — PROGRESS NOTE ADULT - SUBJECTIVE AND OBJECTIVE BOX
DATE OF SERVICE: 05-21-21 @ 14:36    Patient is a 66y old  Female who presents with a chief complaint of     SUBJECTIVE / OVERNIGHT EVENTS:  c/o worsening pain in left forearm and wrist    MEDICATIONS  (STANDING):  amLODIPine   Tablet 2.5 milliGRAM(s) Oral daily  atorvastatin 40 milliGRAM(s) Oral at bedtime  dextrose 40% Gel 15 Gram(s) Oral once  dextrose 5%. 1000 milliLiter(s) (50 mL/Hr) IV Continuous <Continuous>  dextrose 5%. 1000 milliLiter(s) (100 mL/Hr) IV Continuous <Continuous>  dextrose 50% Injectable 25 Gram(s) IV Push once  dextrose 50% Injectable 12.5 Gram(s) IV Push once  dextrose 50% Injectable 25 Gram(s) IV Push once  gabapentin 300 milliGRAM(s) Oral two times a day  glucagon  Injectable 1 milliGRAM(s) IntraMuscular once  heparin  Infusion.  Unit(s)/Hr (15 mL/Hr) IV Continuous <Continuous>  insulin glargine Injectable (LANTUS) 52 Unit(s) SubCutaneous at bedtime  insulin lispro (ADMELOG) corrective regimen sliding scale   SubCutaneous three times a day before meals  insulin lispro (ADMELOG) corrective regimen sliding scale   SubCutaneous at bedtime  insulin lispro Injectable (ADMELOG) 10 Unit(s) SubCutaneous three times a day before meals  LORazepam     Tablet 0.5 milliGRAM(s) Oral once  metoprolol tartrate 25 milliGRAM(s) Oral two times a day  pantoprazole    Tablet 40 milliGRAM(s) Oral before breakfast    MEDICATIONS  (PRN):  heparin   Injectable 6500 Unit(s) IV Push every 6 hours PRN For aPTT less than 40  heparin   Injectable 3000 Unit(s) IV Push every 6 hours PRN For aPTT between 40 - 57      Vital Signs Last 24 Hrs  T(C): 36.6 (21 May 2021 10:53), Max: 36.7 (20 May 2021 20:29)  T(F): 97.8 (21 May 2021 10:53), Max: 98.1 (20 May 2021 20:29)  HR: 84 (21 May 2021 10:53) (84 - 101)  BP: 128/63 (21 May 2021 10:53) (126/76 - 135/78)  BP(mean): --  RR: 18 (21 May 2021 10:53) (18 - 18)  SpO2: 96% (21 May 2021 10:53) (96% - 99%)  CAPILLARY BLOOD GLUCOSE      POCT Blood Glucose.: 275 mg/dL (21 May 2021 11:24)  POCT Blood Glucose.: 264 mg/dL (21 May 2021 07:33)  POCT Blood Glucose.: 332 mg/dL (20 May 2021 21:54)  POCT Blood Glucose.: 325 mg/dL (20 May 2021 16:26)    I&O's Summary    20 May 2021 07:01  -  21 May 2021 07:00  --------------------------------------------------------  IN: 720 mL / OUT: 0 mL / NET: 720 mL    21 May 2021 07:01  -  21 May 2021 14:36  --------------------------------------------------------  IN: 600 mL / OUT: 0 mL / NET: 600 mL        PHYSICAL EXAM:  GENERAL: NAD, well-developed  HEAD:  Atraumatic, Normocephalic  EYES: EOMI, PERRLA, conjunctiva and sclera clear  NECK: Supple, No JVD  CHEST/LUNG: Clear to auscultation bilaterally; No wheeze  HEART: Regular rate and rhythm; No murmurs, rubs, or gallops  ABDOMEN: Soft, Nontender, Nondistended; Bowel sounds present  EXTREMITIES:  2+ Peripheral Pulses, No clubbing, cyanosis, or edema  PSYCH: AAOx3  NEUROLOGY: non-focal  SKIN: No rashes or lesions    LABS:                        12.5   9.06  )-----------( 285      ( 21 May 2021 05:58 )             39.7     05-21    136  |  99  |  15  ----------------------------<  243<H>  4.3   |  22  |  0.75    Ca    9.5      21 May 2021 05:58  Phos  3.7     05-20  Mg     1.8     05-20      PTT - ( 21 May 2021 05:58 )  PTT:76.8 sec      < from: MR Head No Cont (05.21.21 @ 00:11) >    FINDINGS:    There are a few punctate T2/FLAIR hyperintense foci in the subcortical and periventricular white matter, nonspecific finding, but most likely representing sequelae of mild chronic microvascular ischemic disease.    No acute infarction, intracranial hemorrhage or mass.    There is no evidence of hydrocephalus. There are no extra-axial fluid collections. The skull base flow voids are patent.    The patient is status post bilateral lens replacement. There is a polyp/mucus retention cyst in the left maxillary sinus. There is a right mastoid air cell effusion. The left mastoid air cells are grossly clear. The visualized soft tissues and osseous structures appear unremarkable.    IMPRESSION:    Mild chronic microvascular ischemic disease.      < end of copied text >      RADIOLOGY & ADDITIONAL TESTS:    Imaging Personally Reviewed:    Consultant(s) Notes Reviewed:      Care Discussed with Consultants/Other Providers:
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
## 2021-05-21 NOTE — PROGRESS NOTE ADULT - SUBJECTIVE AND OBJECTIVE BOX
Neurology Progress Note    S: Patient seen and examined. No new events overnight. patient denied CP, SOB, HA or pain. LUE a bit better. on heparin drip     Medication:  amLODIPine   Tablet 2.5 milliGRAM(s) Oral daily  atorvastatin 40 milliGRAM(s) Oral at bedtime  dextrose 40% Gel 15 Gram(s) Oral once  dextrose 5%. 1000 milliLiter(s) IV Continuous <Continuous>  dextrose 5%. 1000 milliLiter(s) IV Continuous <Continuous>  dextrose 50% Injectable 25 Gram(s) IV Push once  dextrose 50% Injectable 12.5 Gram(s) IV Push once  dextrose 50% Injectable 25 Gram(s) IV Push once  gabapentin 300 milliGRAM(s) Oral two times a day  glucagon  Injectable 1 milliGRAM(s) IntraMuscular once  heparin   Injectable 6500 Unit(s) IV Push every 6 hours PRN  heparin   Injectable 3000 Unit(s) IV Push every 6 hours PRN  heparin  Infusion.  Unit(s)/Hr IV Continuous <Continuous>  insulin glargine Injectable (LANTUS) 52 Unit(s) SubCutaneous at bedtime  insulin lispro (ADMELOG) corrective regimen sliding scale   SubCutaneous three times a day before meals  insulin lispro (ADMELOG) corrective regimen sliding scale   SubCutaneous at bedtime  insulin lispro Injectable (ADMELOG) 10 Unit(s) SubCutaneous three times a day before meals  LORazepam     Tablet 0.5 milliGRAM(s) Oral once  metoprolol tartrate 25 milliGRAM(s) Oral two times a day  pantoprazole    Tablet 40 milliGRAM(s) Oral before breakfast      Vitals:  Vital Signs Last 24 Hrs  T(C): 36.6 (21 May 2021 10:53), Max: 36.7 (20 May 2021 20:29)  T(F): 97.8 (21 May 2021 10:53), Max: 98.1 (20 May 2021 20:29)  HR: 84 (21 May 2021 10:53) (84 - 103)  BP: 128/63 (21 May 2021 10:53) (126/76 - 155/90)  BP(mean): --  RR: 18 (21 May 2021 10:53) (18 - 18)  SpO2: 96% (21 May 2021 10:53) (96% - 99%)    General Exam:   General Appearance: Appropriately dressed and in no acute distress       Head: Normocephalic, atraumatic and no dysmorphic features  Ear, Nose, and Throat: Moist mucous membranes  CVS: S1S2+  Resp: No SOB, no wheeze or rhonchi  Abd: soft NTND  Extremities: No edema, no cyanosis  Skin: No bruises, no rashes     Neurological Exam:  MS: Oriented x3. Fluent. Follows crossed commands.   CN: VFF. EOMI. V1-V3 intact. Face symmetric. T/u midline.   Motor: Full strength throughout.   Sensory: Intact to LT throughout   Reflexes: 2+ throughout. Babinski absent bilaterally.   Coordination: No dysmetria on FNF or ataxia on HTS bilaterally   Gait: Wide based gait    I personally reviewed the below data/images/labs:      CBC Full  -  ( 21 May 2021 05:58 )  WBC Count : 9.06 K/uL  RBC Count : 4.97 M/uL  Hemoglobin : 12.5 g/dL  Hematocrit : 39.7 %  Platelet Count - Automated : 285 K/uL  Mean Cell Volume : 79.9 fl  Mean Cell Hemoglobin : 25.2 pg  Mean Cell Hemoglobin Concentration : 31.5 gm/dL  Auto Neutrophil # : x  Auto Lymphocyte # : x  Auto Monocyte # : x  Auto Eosinophil # : x  Auto Basophil # : x  Auto Neutrophil % : x  Auto Lymphocyte % : x  Auto Monocyte % : x  Auto Eosinophil % : x  Auto Basophil % : x    05-21    136  |  99  |  15  ----------------------------<  243<H>  4.3   |  22  |  0.75    Ca    9.5      21 May 2021 05:58  Phos  3.7     05-20  Mg     1.8     05-20        PTT - ( 21 May 2021 05:58 )  PTT:76.8 sec    < from: MR Head No Cont (05.21.21 @ 00:11) >    EXAM:  MR BRAIN                            PROCEDURE DATE:  05/21/2021            INTERPRETATION:  CLINICAL INDICATION: Status post cardiac catheterization with unsteady gait.    TECHNIQUE: Multi-planar multi-sequential MR imaging of the brain was performed without intravenous contrast.    COMPARISON: CT head, CTA head and neck 5/19/2021.    FINDINGS:    There are a few punctate T2/FLAIR hyperintense foci in the subcortical and periventricular white matter, nonspecific finding, but most likely representing sequelae of mild chronic microvascular ischemic disease.    No acute infarction, intracranial hemorrhage or mass.    There is no evidence of hydrocephalus. There are no extra-axial fluid collections. The skull base flow voids are patent.    The patient is status post bilateral lens replacement. There is a polyp/mucus retention cyst in the left maxillary sinus. There is a right mastoid air cell effusion. The left mastoid air cells are grossly clear. The visualized soft tissues and osseous structures appear unremarkable.    IMPRESSION:    Mild chronic microvascular ischemic disease.                DONAL GARCIA MD; Attending Radiologist  This document has been electronically signed. May 21 2021  9:39AM    < end of copied text >  < from: US Duplex Arterial Upper Ext Ltd, Left (05.19.21 @ 10:46) >    EXAM:  US DPLX UPR EXT ARTS LTD LT                            PROCEDURE DATE:  05/19/2021            INTERPRETATION:  History: Recent left radial artery catheterization, painful left upper extremity, evaluate for pseudoaneurysm.    Impression: Limited examination of the arteries of the left forearm.    The left radial artery is occluded, thrombosed.    There is no pseudoaneurysm or fistula      A complete arterial Doppler ultrasound examination of the left upper extremity is recommended.                KEITH JARRETT M.D., ATTENDING RADIOLOGIST  This document has been electronically signed. May 19 2021 10:02AM    < end of copied text >

## 2021-05-21 NOTE — CHART NOTE - NSCHARTNOTEFT_GEN_A_CORE
Pt seen and examined at bedside after team was paged that pt c/o increasing severity and location of hand pain extending proximally to the mid forearm. On exam patient's forearm is exquisitely tender to light touch circumferentially from wrist to mid forearm. Weakly palpable radial pulse and palpable ulnar pulse. Hand motor strength diminished 2/2 pain. Sensation diminished in Ulnar distribution (digits 3-5) . Vascular exam concerning for compartment syndrome/ intramuscular hematoma with associated nerve compression.    PLAN:  - Pain control PRN  - STAT CTA of LUE  - STAT Hand consult r/o compartment syndrome  - Vascular surgery team will follow    Ever Ayala PGY-1  Vascular Surgery   [id number]

## 2021-05-21 NOTE — CONSULT NOTE ADULT - CONSULT REASON
Gait dysfunction
Left upper extremity vascular thrombosis; rule out compartment syndrome.
Left Radial Artery Thrombosis s/p recent cardiac cath access
High Blood Sugars/DMT2  Thyroid nodule

## 2021-05-21 NOTE — PROGRESS NOTE ADULT - ASSESSMENT
Assessment  DMT2: 66y Female with DM T2 with hyperglycemia, A1C 10.4%, was on oral meds and insulin at home, now on basal bolus insulin, increased dose yesterday, blood sugars improving though still running high and not at target, no hypoglycemic episodes, eating meals, appears alert and comfortable.  Thyroid Nodule: Known to patient, she reports having biopsy done 2 years ago, thinks results were benign, has FU appointment in June. Thyroid US reviewed. Euthyroid, denies dysphagia or compressive sxs.  Radial Artery Thrombosis: on medications, monitored, FU Vascular.  Obesity: No strict exercise routines, not on any weight loss program, neither on low calorie diet.        Michelle Diana MD  Cell: [phone]  Office: [phone]     Assessment  DMT2: 66y Female with DM T2 with hyperglycemia, A1C 10.4%, was on oral meds and insulin at home, now on basal bolus insulin, increased dose yesterday,  blood sugars improving though still running high and not at target, no hypoglycemic episodes, eating meals, appears alert and comfortable.  Thyroid Nodule: Known to patient, she reports having biopsy done 2 years ago, thinks results were benign, has FU appointment in June. Thyroid US reviewed. Euthyroid, denies dysphagia or compressive sxs.  Radial Artery Thrombosis: on medications, monitored, FU Vascular.  Obesity: No strict exercise routines, not on any weight loss program, neither on low calorie diet.        Michelle Diana MD  Cell: [phone]  Office: [phone]

## 2021-05-21 NOTE — PROGRESS NOTE ADULT - ASSESSMENT
67 y/o female PMH DM2, hyperlipidemia, HTN p/w worsening L forearm pain left since Thursday post angiogram. Pain radiates from L wrist to elbow, worse with palpation. Pt states she had an angiogram at [facility] on Friday 5/14 w/ Dr. Lisette Santillan that revealed only 30% stenosis with no intervention. Pt also endorses mild epigastric burning but otherwise denies fever, chills, CP, SOB, N/V, numbness, tingling, weakness of the extremity     thrombosis of left radial artery  - IV heparin per vascular  - ? change to oral a/c  - vascular to follow  - trial of abx    unsteady gait and difficulty ambulating since the cardiac cath. Exam non focal.   - seen by neuro  - CTH negative  - CTA h/n R. P1 severe stenosis, otherwise no stenoocclusive disease.   - r/o stroke, may have had iatrogenic stroke during cath.    - pt is on iv heparin  - lipitor 40mg daily  - MRI brain w/o done - neg for stroke  - PT/OT  - A1c and lipids    uncontrolled diabetes  - fs qid  - hgb a1c 10.4  - insulin ss  - hold oral antidiabetic meds  - diabetic diet  - endo consult following    HTN  - c/w home meds

## 2021-05-21 NOTE — CHART NOTE - NSCHARTNOTEFT_GEN_A_CORE
Patient's forearm with increased tenderness to touch and extending proximally from wrist to mid forearm. Palpable radial pulse but weaker than AM, palpable ulnar pulse. Hand motor strength diminished 2/2 pain. Per Vascular - CTA LUE urgent - CT tech called for urgent study  Per hand surgeon - No evidence of clinically significant compartment syndrome.    [id number] Patient's forearm with increased tenderness to touch and extending proximally from wrist to mid forearm. Palpable radial pulse but weaker than AM, palpable ulnar pulse. Hand motor strength diminished due to pain. Andreia tissues soft.   Per Vascular - CTA LUE urgent - CT tech called for urgent study  Per hand surgeon - No evidence of clinically significant compartment syndrome.    [id number]

## 2021-05-21 NOTE — CONSULT NOTE ADULT - ASSESSMENT
65 y/o female PMH DM2, hyperlipidemia, HTN p/w worsening Left Forearm pain left since Thursday post angiogram in [facility] Suspect left radial artery thrombosis. No evidence of clinically significant compartment syndrome. Stable.

## 2021-05-21 NOTE — PROGRESS NOTE ADULT - SUBJECTIVE AND OBJECTIVE BOX
Chief complaint  Patient is a 66y old  Female who presents with a chief complaint of  Review of systems  Patient awake in bed, looks comfortable, no hypoglycemic episodes.    Labs and Fingersticks  CAPILLARY BLOOD GLUCOSE      POCT Blood Glucose.: 275 mg/dL (21 May 2021 11:24)  POCT Blood Glucose.: 264 mg/dL (21 May 2021 07:33)  POCT Blood Glucose.: 332 mg/dL (20 May 2021 21:54)  POCT Blood Glucose.: 325 mg/dL (20 May 2021 16:26)      Anion Gap, Serum: 15 (05-21 @ 05:58)  Anion Gap, Serum: 16 (05-20 @ 05:36)      Calcium, Total Serum: 9.5 (05-21 @ 05:58)  Calcium, Total Serum: 9.4 (05-20 @ 05:36)          05-21    136  |  99  |  15  ----------------------------<  243<H>  4.3   |  22  |  0.75    Ca    9.5      21 May 2021 05:58  Phos  3.7     05-20  Mg     1.8     05-20                          12.5   9.06  )-----------( 285      ( 21 May 2021 05:58 )             39.7     Medications  MEDICATIONS  (STANDING):  amLODIPine   Tablet 2.5 milliGRAM(s) Oral daily  atorvastatin 40 milliGRAM(s) Oral at bedtime  dextrose 40% Gel 15 Gram(s) Oral once  dextrose 5%. 1000 milliLiter(s) (50 mL/Hr) IV Continuous <Continuous>  dextrose 5%. 1000 milliLiter(s) (100 mL/Hr) IV Continuous <Continuous>  dextrose 50% Injectable 25 Gram(s) IV Push once  dextrose 50% Injectable 12.5 Gram(s) IV Push once  dextrose 50% Injectable 25 Gram(s) IV Push once  gabapentin 300 milliGRAM(s) Oral two times a day  glucagon  Injectable 1 milliGRAM(s) IntraMuscular once  heparin  Infusion.  Unit(s)/Hr (15 mL/Hr) IV Continuous <Continuous>  insulin glargine Injectable (LANTUS) 52 Unit(s) SubCutaneous at bedtime  insulin lispro (ADMELOG) corrective regimen sliding scale   SubCutaneous three times a day before meals  insulin lispro (ADMELOG) corrective regimen sliding scale   SubCutaneous at bedtime  insulin lispro Injectable (ADMELOG) 10 Unit(s) SubCutaneous three times a day before meals  LORazepam     Tablet 0.5 milliGRAM(s) Oral once  metoprolol tartrate 25 milliGRAM(s) Oral two times a day  pantoprazole    Tablet 40 milliGRAM(s) Oral before breakfast      Physical Exam  General: Patient comfortable in bed  Vital Signs Last 12 Hrs  T(F): 97.8 (05-21-21 @ 10:53), Max: 97.8 (05-21-21 @ 05:01)  HR: 84 (05-21-21 @ 10:53) (84 - 99)  BP: 128/63 (05-21-21 @ 10:53) (128/63 - 135/78)  BP(mean): --  RR: 18 (05-21-21 @ 10:53) (18 - 18)  SpO2: 96% (05-21-21 @ 10:53) (96% - 99%)  Neck: No palpable thyroid nodules.  CVS: S1S2, No murmurs  Respiratory: No wheezing, no crepitations  GI: Abdomen soft, bowel sounds positive  Musculoskeletal:  edema lower extremities.   Skin: No skin rashes, no ecchymosis    Diagnostics             Chief complaint  Patient is a 66y old  Female who presents with a chief complaint of  Review of systems  Patient awake in bed, looks comfortable, no hypoglycemic episodes.    Labs and Fingersticks  CAPILLARY BLOOD GLUCOSE      POCT Blood Glucose.: 275 mg/dL (21 May 2021 11:24)  POCT Blood Glucose.: 264 mg/dL (21 May 2021 07:33)  POCT Blood Glucose.: 332 mg/dL (20 May 2021 21:54)  POCT Blood Glucose.: 325 mg/dL (20 May 2021 16:26)      Anion Gap, Serum: 15 (05-21 @ 05:58)  Anion Gap, Serum: 16 (05-20 @ 05:36)      Calcium, Total Serum: 9.5 (05-21 @ 05:58)  Calcium, Total Serum: 9.4 (05-20 @ 05:36)          05-21    136  |  99  |  15  ----------------------------<  243<H>  4.3   |  22  |  0.75    Ca    9.5      21 May 2021 05:58  Phos  3.7     05-20  Mg     1.8     05-20                          12.5   9.06  )-----------( 285      ( 21 May 2021 05:58 )             39.7     Medications  MEDICATIONS  (STANDING):  amLODIPine   Tablet 2.5 milliGRAM(s) Oral daily  atorvastatin 40 milliGRAM(s) Oral at bedtime  dextrose 40% Gel 15 Gram(s) Oral once  dextrose 5%. 1000 milliLiter(s) (50 mL/Hr) IV Continuous <Continuous>  dextrose 5%. 1000 milliLiter(s) (100 mL/Hr) IV Continuous <Continuous>  dextrose 50% Injectable 25 Gram(s) IV Push once  dextrose 50% Injectable 12.5 Gram(s) IV Push once  dextrose 50% Injectable 25 Gram(s) IV Push once  gabapentin 300 milliGRAM(s) Oral two times a day  glucagon  Injectable 1 milliGRAM(s) IntraMuscular once  heparin  Infusion.  Unit(s)/Hr (15 mL/Hr) IV Continuous <Continuous>  insulin glargine Injectable (LANTUS) 52 Unit(s) SubCutaneous at bedtime  insulin lispro (ADMELOG) corrective regimen sliding scale   SubCutaneous three times a day before meals  insulin lispro (ADMELOG) corrective regimen sliding scale   SubCutaneous at bedtime  insulin lispro Injectable (ADMELOG) 10 Unit(s) SubCutaneous three times a day before meals  LORazepam     Tablet 0.5 milliGRAM(s) Oral once  metoprolol tartrate 25 milliGRAM(s) Oral two times a day  pantoprazole    Tablet 40 milliGRAM(s) Oral before breakfast      Physical Exam  General: Patient comfortable in bed  Vital Signs Last 12 Hrs  T(F): 97.8 (05-21-21 @ 10:53), Max: 97.8 (05-21-21 @ 05:01)  HR: 84 (05-21-21 @ 10:53) (84 - 99)  BP: 128/63 (05-21-21 @ 10:53) (128/63 - 135/78)  BP(mean): --  RR: 18 (05-21-21 @ 10:53) (18 - 18)  SpO2: 96% (05-21-21 @ 10:53) (96% - 99%)  Neck: No palpable thyroid nodules.  CVS: S1S2, No murmurs  Respiratory: No wheezing, no crepitations  GI: Abdomen soft, bowel sounds positive  Musculoskeletal:  edema lower extremities.   Skin: No skin rashes, no ecchymosis    Diagnostics
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
## 2021-05-21 NOTE — CONSULT NOTE ADULT - PROBLEM SELECTOR RECOMMENDATION 9
Thrombosis of left radial artery in distal forearm on ultrasound. No evidence of compartment syndrome at this time. Would manage per vascular surgery pending the CT angio of the left upper extremity. Will be available to perform fasciotomies in the forearm if needed. Thank you for this interesting consult.
Will increase Lantus to 46u at bedtime.  Will start Admelog 7u before each meal and continue Admelog correction scale coverage. Will continue monitoring FS and FU DC recs.  Patient counseled for compliance with consistent low carb diet and exercise as tolerated outpatient.

## 2021-05-22 LAB
APTT BLD: 41.2 SEC — HIGH (ref 27.5–35.5)
GLUCOSE BLDC GLUCOMTR-MCNC: 213 MG/DL — HIGH (ref 70–99)
GLUCOSE BLDC GLUCOMTR-MCNC: 238 MG/DL — HIGH (ref 70–99)
GLUCOSE BLDC GLUCOMTR-MCNC: 250 MG/DL — HIGH (ref 70–99)
GLUCOSE BLDC GLUCOMTR-MCNC: 278 MG/DL — HIGH (ref 70–99)
HCT VFR BLD CALC: 37.7 % — SIGNIFICANT CHANGE UP (ref 34.5–45)
HGB BLD-MCNC: 12.2 G/DL — SIGNIFICANT CHANGE UP (ref 11.5–15.5)
INR BLD: 0.97 RATIO — SIGNIFICANT CHANGE UP (ref 0.88–1.16)
MCHC RBC-ENTMCNC: 25.6 PG — LOW (ref 27–34)
MCHC RBC-ENTMCNC: 32.4 GM/DL — SIGNIFICANT CHANGE UP (ref 32–36)
MCV RBC AUTO: 79.2 FL — LOW (ref 80–100)
NRBC # BLD: 0 /100 WBCS — SIGNIFICANT CHANGE UP (ref 0–0)
PLATELET # BLD AUTO: 293 K/UL — SIGNIFICANT CHANGE UP (ref 150–400)
PROTHROM AB SERPL-ACNC: 11.6 SEC — SIGNIFICANT CHANGE UP (ref 10.6–13.6)
RBC # BLD: 4.76 M/UL — SIGNIFICANT CHANGE UP (ref 3.8–5.2)
RBC # FLD: 13.9 % — SIGNIFICANT CHANGE UP (ref 10.3–14.5)
WBC # BLD: 9.28 K/UL — SIGNIFICANT CHANGE UP (ref 3.8–10.5)
WBC # FLD AUTO: 9.28 K/UL — SIGNIFICANT CHANGE UP (ref 3.8–10.5)

## 2021-05-22 RX ORDER — INSULIN LISPRO 100/ML
12 VIAL (ML) SUBCUTANEOUS
Refills: 0 | Status: DISCONTINUED | OUTPATIENT
Start: 2021-05-22 | End: 2021-05-22

## 2021-05-22 RX ORDER — HEPARIN SODIUM 5000 [USP'U]/ML
3000 INJECTION INTRAVENOUS; SUBCUTANEOUS EVERY 6 HOURS
Refills: 0 | Status: DISCONTINUED | OUTPATIENT
Start: 2021-05-22 | End: 2021-05-22

## 2021-05-22 RX ORDER — HEPARIN SODIUM 5000 [USP'U]/ML
6500 INJECTION INTRAVENOUS; SUBCUTANEOUS EVERY 6 HOURS
Refills: 0 | Status: DISCONTINUED | OUTPATIENT
Start: 2021-05-22 | End: 2021-05-22

## 2021-05-22 RX ORDER — HEPARIN SODIUM 5000 [USP'U]/ML
1100 INJECTION INTRAVENOUS; SUBCUTANEOUS
Qty: 25000 | Refills: 0 | Status: DISCONTINUED | OUTPATIENT
Start: 2021-05-22 | End: 2021-05-22

## 2021-05-22 RX ORDER — INSULIN LISPRO 100/ML
13 VIAL (ML) SUBCUTANEOUS
Refills: 0 | Status: DISCONTINUED | OUTPATIENT
Start: 2021-05-22 | End: 2021-05-23

## 2021-05-22 RX ORDER — ACETAMINOPHEN 500 MG
650 TABLET ORAL ONCE
Refills: 0 | Status: COMPLETED | OUTPATIENT
Start: 2021-05-22 | End: 2021-05-22

## 2021-05-22 RX ORDER — INSULIN GLARGINE 100 [IU]/ML
56 INJECTION, SOLUTION SUBCUTANEOUS AT BEDTIME
Refills: 0 | Status: DISCONTINUED | OUTPATIENT
Start: 2021-05-22 | End: 2021-05-23

## 2021-05-22 RX ORDER — HEPARIN SODIUM 5000 [USP'U]/ML
6500 INJECTION INTRAVENOUS; SUBCUTANEOUS ONCE
Refills: 0 | Status: COMPLETED | OUTPATIENT
Start: 2021-05-22 | End: 2021-05-22

## 2021-05-22 RX ORDER — ASPIRIN/CALCIUM CARB/MAGNESIUM 324 MG
81 TABLET ORAL DAILY
Refills: 0 | Status: DISCONTINUED | OUTPATIENT
Start: 2021-05-23 | End: 2021-05-23

## 2021-05-22 RX ADMIN — GABAPENTIN 300 MILLIGRAM(S): 400 CAPSULE ORAL at 17:19

## 2021-05-22 RX ADMIN — Medication 100 MILLIGRAM(S): at 05:27

## 2021-05-22 RX ADMIN — Medication 13 UNIT(S): at 12:08

## 2021-05-22 RX ADMIN — Medication 100 MILLIGRAM(S): at 22:05

## 2021-05-22 RX ADMIN — Medication 1: at 21:51

## 2021-05-22 RX ADMIN — Medication 2: at 17:16

## 2021-05-22 RX ADMIN — Medication 650 MILLIGRAM(S): at 03:15

## 2021-05-22 RX ADMIN — Medication 2: at 08:14

## 2021-05-22 RX ADMIN — INSULIN GLARGINE 56 UNIT(S): 100 INJECTION, SOLUTION SUBCUTANEOUS at 21:51

## 2021-05-22 RX ADMIN — GABAPENTIN 300 MILLIGRAM(S): 400 CAPSULE ORAL at 05:24

## 2021-05-22 RX ADMIN — AMLODIPINE BESYLATE 2.5 MILLIGRAM(S): 2.5 TABLET ORAL at 05:24

## 2021-05-22 RX ADMIN — HEPARIN SODIUM 6500 UNIT(S): 5000 INJECTION INTRAVENOUS; SUBCUTANEOUS at 01:05

## 2021-05-22 RX ADMIN — HEPARIN SODIUM 1100 UNIT(S)/HR: 5000 INJECTION INTRAVENOUS; SUBCUTANEOUS at 01:05

## 2021-05-22 RX ADMIN — PANTOPRAZOLE SODIUM 40 MILLIGRAM(S): 20 TABLET, DELAYED RELEASE ORAL at 05:24

## 2021-05-22 RX ADMIN — Medication 25 MILLIGRAM(S): at 05:24

## 2021-05-22 RX ADMIN — Medication 13 UNIT(S): at 08:15

## 2021-05-22 RX ADMIN — Medication 2: at 12:08

## 2021-05-22 RX ADMIN — Medication 100 MILLIGRAM(S): at 14:56

## 2021-05-22 RX ADMIN — Medication 13 UNIT(S): at 17:17

## 2021-05-22 RX ADMIN — Medication 650 MILLIGRAM(S): at 02:45

## 2021-05-22 RX ADMIN — Medication 25 MILLIGRAM(S): at 17:19

## 2021-05-22 RX ADMIN — ATORVASTATIN CALCIUM 40 MILLIGRAM(S): 80 TABLET, FILM COATED ORAL at 21:51

## 2021-05-22 NOTE — PROGRESS NOTE ADULT - ASSESSMENT
Assessment  DMT2: 66y Female with DM T2 with hyperglycemia, A1C 10.4%, was on oral meds and insulin at home, now on basal bolus insulin, increased dose yesterday,  blood sugars improving though still running high and not at target, no hypoglycemic episodes, eating meals, appears alert and comfortable.  Thyroid Nodule: Known to patient, she reports having biopsy done 2 years ago, thinks results were benign, has FU appointment in June. Thyroid US reviewed. Euthyroid, denies dysphagia or compressive sxs.  Radial Artery Thrombosis: on medications, monitored, FU Vascular.  Obesity: No strict exercise routines, not on any weight loss program, neither on low calorie diet.        Michelle Diana MD  Cell: [phone]  Office: [phone]

## 2021-05-22 NOTE — PROGRESS NOTE ADULT - SUBJECTIVE AND OBJECTIVE BOX
DATE OF SERVICE: 05-22-21 @ 08:28    Patient is a 66y old  Female who presents with a chief complaint of Left upper extremity vascular thrombosis (radial artery) (21 May 2021 18:10)      SUBJECTIVE / OVERNIGHT EVENTS:  left wrist is not as tender and less warm    MEDICATIONS  (STANDING):  amLODIPine   Tablet 2.5 milliGRAM(s) Oral daily  atorvastatin 40 milliGRAM(s) Oral at bedtime  ceFAZolin   IVPB      ceFAZolin   IVPB 1000 milliGRAM(s) IV Intermittent every 8 hours  dextrose 40% Gel 15 Gram(s) Oral once  dextrose 5%. 1000 milliLiter(s) (50 mL/Hr) IV Continuous <Continuous>  dextrose 5%. 1000 milliLiter(s) (100 mL/Hr) IV Continuous <Continuous>  dextrose 50% Injectable 25 Gram(s) IV Push once  dextrose 50% Injectable 12.5 Gram(s) IV Push once  dextrose 50% Injectable 25 Gram(s) IV Push once  gabapentin 300 milliGRAM(s) Oral two times a day  glucagon  Injectable 1 milliGRAM(s) IntraMuscular once  insulin glargine Injectable (LANTUS) 56 Unit(s) SubCutaneous at bedtime  insulin lispro (ADMELOG) corrective regimen sliding scale   SubCutaneous three times a day before meals  insulin lispro (ADMELOG) corrective regimen sliding scale   SubCutaneous at bedtime  insulin lispro Injectable (ADMELOG) 13 Unit(s) SubCutaneous three times a day before meals  LORazepam     Tablet 0.5 milliGRAM(s) Oral once  metoprolol tartrate 25 milliGRAM(s) Oral two times a day  pantoprazole    Tablet 40 milliGRAM(s) Oral before breakfast    MEDICATIONS  (PRN):      Vital Signs Last 24 Hrs  T(C): 36.8 (22 May 2021 04:12), Max: 36.8 (22 May 2021 04:12)  T(F): 98.2 (22 May 2021 04:12), Max: 98.2 (22 May 2021 04:12)  HR: 81 (22 May 2021 04:12) (81 - 113)  BP: 121/72 (22 May 2021 04:12) (121/72 - 134/82)  BP(mean): --  RR: 18 (22 May 2021 04:12) (18 - 18)  SpO2: 96% (22 May 2021 04:12) (96% - 96%)  CAPILLARY BLOOD GLUCOSE      POCT Blood Glucose.: 238 mg/dL (22 May 2021 07:25)  POCT Blood Glucose.: 287 mg/dL (21 May 2021 21:03)  POCT Blood Glucose.: 288 mg/dL (21 May 2021 16:36)  POCT Blood Glucose.: 275 mg/dL (21 May 2021 11:24)    I&O's Summary    21 May 2021 07:01  -  22 May 2021 07:00  --------------------------------------------------------  IN: 840 mL / OUT: 0 mL / NET: 840 mL        PHYSICAL EXAM:  GENERAL: NAD, well-developed  HEAD:  Atraumatic, Normocephalic  EYES: EOMI, PERRLA, conjunctiva and sclera clear  NECK: Supple, No JVD  CHEST/LUNG: Clear to auscultation bilaterally; No wheeze  HEART: Regular rate and rhythm; No murmurs, rubs, or gallops  ABDOMEN: Soft, Nontender, Nondistended; Bowel sounds present  EXTREMITIES:  2+ Peripheral Pulses, No clubbing, cyanosis, or edema  PSYCH: AAOx3  NEUROLOGY: non-focal  SKIN: No rashes or lesions    LABS:                        12.2   9.28  )-----------( 293      ( 22 May 2021 06:04 )             37.7     05-21    136  |  99  |  15  ----------------------------<  243<H>  4.3   |  22  |  0.75    Ca    9.5      21 May 2021 05:58      PT/INR - ( 22 May 2021 06:04 )   PT: 11.6 sec;   INR: 0.97 ratio         PTT - ( 22 May 2021 06:04 )  PTT:41.2 sec          RADIOLOGY & ADDITIONAL TESTS:    Imaging Personally Reviewed:    Consultant(s) Notes Reviewed:      Care Discussed with Consultants/Other Providers:

## 2021-05-22 NOTE — PROGRESS NOTE ADULT - SUBJECTIVE AND OBJECTIVE BOX
Chief complaint  Patient is a 66y old  Female who presents with a chief complaint of Left upper extremity vascular thrombosis (radial artery) (21 May 2021 18:10)   Review of systems  Patient in bed, appears comfortable.    Labs and Fingersticks  CAPILLARY BLOOD GLUCOSE      POCT Blood Glucose.: 238 mg/dL (22 May 2021 07:25)  POCT Blood Glucose.: 287 mg/dL (21 May 2021 21:03)  POCT Blood Glucose.: 288 mg/dL (21 May 2021 16:36)  POCT Blood Glucose.: 275 mg/dL (21 May 2021 11:24)      Anion Gap, Serum: 15 (05-21 @ 05:58)      Calcium, Total Serum: 9.5 (05-21 @ 05:58)          05-21    136  |  99  |  15  ----------------------------<  243<H>  4.3   |  22  |  0.75    Ca    9.5      21 May 2021 05:58                          12.2   9.28  )-----------( 293      ( 22 May 2021 06:04 )             37.7     Medications  MEDICATIONS  (STANDING):  amLODIPine   Tablet 2.5 milliGRAM(s) Oral daily  atorvastatin 40 milliGRAM(s) Oral at bedtime  ceFAZolin   IVPB      ceFAZolin   IVPB 1000 milliGRAM(s) IV Intermittent every 8 hours  dextrose 40% Gel 15 Gram(s) Oral once  dextrose 5%. 1000 milliLiter(s) (50 mL/Hr) IV Continuous <Continuous>  dextrose 5%. 1000 milliLiter(s) (100 mL/Hr) IV Continuous <Continuous>  dextrose 50% Injectable 25 Gram(s) IV Push once  dextrose 50% Injectable 12.5 Gram(s) IV Push once  dextrose 50% Injectable 25 Gram(s) IV Push once  gabapentin 300 milliGRAM(s) Oral two times a day  glucagon  Injectable 1 milliGRAM(s) IntraMuscular once  insulin glargine Injectable (LANTUS) 56 Unit(s) SubCutaneous at bedtime  insulin lispro (ADMELOG) corrective regimen sliding scale   SubCutaneous three times a day before meals  insulin lispro (ADMELOG) corrective regimen sliding scale   SubCutaneous at bedtime  insulin lispro Injectable (ADMELOG) 13 Unit(s) SubCutaneous three times a day before meals  LORazepam     Tablet 0.5 milliGRAM(s) Oral once  metoprolol tartrate 25 milliGRAM(s) Oral two times a day  pantoprazole    Tablet 40 milliGRAM(s) Oral before breakfast      Physical Exam  General: Patient comfortable in bed  Vital Signs Last 12 Hrs  T(F): 98.2 (05-22-21 @ 04:12), Max: 98.2 (05-22-21 @ 04:12)  HR: 81 (05-22-21 @ 04:12) (81 - 81)  BP: 121/72 (05-22-21 @ 04:12) (121/72 - 121/72)  BP(mean): --  RR: 18 (05-22-21 @ 04:12) (18 - 18)  SpO2: 96% (05-22-21 @ 04:12) (96% - 96%)  Neck: No palpable thyroid nodules.  CVS: S1S2, No murmurs  Respiratory: No wheezing, no crepitations  GI: Abdomen soft, bowel sounds positive  Musculoskeletal:  edema lower extremities.     Diagnostics

## 2021-05-22 NOTE — PROGRESS NOTE ADULT - SUBJECTIVE AND OBJECTIVE BOX
Surgery Progress Note    SUBJECTIVE:  - Patient seen and examined at bedside. Patient still endorsing pain extending proximally to the mid forearm, but reports it has improved a little.     --------------------------------------------------------------------------------------------------  V/S:  Vital Signs Last 24 Hrs  T(C): 36.8 (22 May 2021 04:12), Max: 36.8 (22 May 2021 04:12)  T(F): 98.2 (22 May 2021 04:12), Max: 98.2 (22 May 2021 04:12)  HR: 81 (22 May 2021 04:12) (81 - 113)  BP: 121/72 (22 May 2021 04:12) (121/72 - 134/82)  BP(mean): --  RR: 18 (22 May 2021 04:12) (18 - 18)  SpO2: 96% (22 May 2021 04:12) (96% - 96%)    --------------------------------------------------------------------------------------------------  I/Os:      21 May 2021 07:01  -  22 May 2021 07:00  --------------------------------------------------------  IN:    Oral Fluid: 840 mL  Total IN: 840 mL    OUT:  Total OUT: 0 mL    Total NET: 840 mL        OBJECTIVE:   Physical Exam:  General: AAOx3, NAD, lying comfortably in bed  HEENT: NC/AT  Respiratory: nonlabored breathing  Cardiovascular: RRR, normal S1 and S2, no murmurs or gallops  Abdomen: non-distended, soft, non-tender  Extremities: WWP, palpable radial pulse and palpable ulnar pulse. Hand motor strength diminished 2/2 pain. Sensation diminished in Ulnar distribution (digits 3-5)   -------------------------------------------------------------------------------------------------  LABS:                                  12.2   9.28  )-----------( 293      ( 22 May 2021 06:04 )             37.7       05-21    136  |  99  |  15  ----------------------------<  243<H>  4.3   |  22  |  0.75    Ca    9.5      21 May 2021 05:58        PT/INR - ( 22 May 2021 06:04 )   PT: 11.6 sec;   INR: 0.97 ratio         PTT - ( 22 May 2021 06:04 )  PTT:41.2 sec          CAPILLARY BLOOD GLUCOSE      POCT Blood Glucose.: 238 mg/dL (22 May 2021 07:25)      --------------------------------------------------------------------------------------------------  MEDICATIONS  (STANDING):  amLODIPine   Tablet 2.5 milliGRAM(s) Oral daily  atorvastatin 40 milliGRAM(s) Oral at bedtime  ceFAZolin   IVPB      ceFAZolin   IVPB 1000 milliGRAM(s) IV Intermittent every 8 hours  dextrose 40% Gel 15 Gram(s) Oral once  dextrose 5%. 1000 milliLiter(s) (50 mL/Hr) IV Continuous <Continuous>  dextrose 5%. 1000 milliLiter(s) (100 mL/Hr) IV Continuous <Continuous>  dextrose 50% Injectable 25 Gram(s) IV Push once  dextrose 50% Injectable 12.5 Gram(s) IV Push once  dextrose 50% Injectable 25 Gram(s) IV Push once  gabapentin 300 milliGRAM(s) Oral two times a day  glucagon  Injectable 1 milliGRAM(s) IntraMuscular once  insulin glargine Injectable (LANTUS) 56 Unit(s) SubCutaneous at bedtime  insulin lispro (ADMELOG) corrective regimen sliding scale   SubCutaneous three times a day before meals  insulin lispro (ADMELOG) corrective regimen sliding scale   SubCutaneous at bedtime  insulin lispro Injectable (ADMELOG) 13 Unit(s) SubCutaneous three times a day before meals  LORazepam     Tablet 0.5 milliGRAM(s) Oral once  metoprolol tartrate 25 milliGRAM(s) Oral two times a day  pantoprazole    Tablet 40 milliGRAM(s) Oral before breakfast    MEDICATIONS  (PRN):

## 2021-05-22 NOTE — PROGRESS NOTE ADULT - ASSESSMENT
67F w/ PMHx of HTN, HLD, and DM who presents with left arm swelling and pain over the past 6 days. Patient had recently undergone a LHC with left radial artery access at [facility]. Since the procedure, patient had experienced increased swelling and pain at the distal ventral forearm at the site of arterial access. Due to worsening pain and swelling, patient decided to visit [facility] ED for evaluation. In the Ed, patient was afebrile, hemodynamically stable, labs largely unremarkable, Duplex of left arm was significant for left radial artery thrombosis, no signs of PSA. Vascular surgery consulted for further evaluation.    PLAN:  - No vascular surgical intervention warranted at this time.  - Patient can be discharged without AC  - Discussed with Vascular fellow    Vascular Surgery   [id number] 67F w/ PMHx of HTN, HLD, and DM who presents with left arm swelling and pain over the past 6 days. Patient had recently undergone a LHC with left radial artery access at [facility]. Since the procedure, patient had experienced increased swelling and pain at the distal ventral forearm at the site of arterial access. Due to worsening pain and swelling, patient decided to visit [facility] ED for evaluation. In the Ed, patient was afebrile, hemodynamically stable, labs largely unremarkable, Duplex of left arm was significant for left radial artery thrombosis, no signs of PSA. Vascular surgery consulted for further evaluation.    PLAN:  - No vascular surgical intervention warranted at this time.  - Patient can be discharged without AC  - Please contact vascular surgery if you have any additional questions or concerns  - Discussed with Vascular fellow    Vascular Surgery   [id number]

## 2021-05-22 NOTE — PROGRESS NOTE ADULT - SUBJECTIVE AND OBJECTIVE BOX
Neurology Progress Note    S: Patient seen and examined. No new events overnight. patient denied CP, SOB, HA or pain. LUE a bit better. off heparin drip     Medication:  MEDICATIONS  (STANDING):  amLODIPine   Tablet 2.5 milliGRAM(s) Oral daily  atorvastatin 40 milliGRAM(s) Oral at bedtime  ceFAZolin   IVPB      ceFAZolin   IVPB 1000 milliGRAM(s) IV Intermittent every 8 hours  dextrose 40% Gel 15 Gram(s) Oral once  dextrose 5%. 1000 milliLiter(s) (50 mL/Hr) IV Continuous <Continuous>  dextrose 5%. 1000 milliLiter(s) (100 mL/Hr) IV Continuous <Continuous>  dextrose 50% Injectable 25 Gram(s) IV Push once  dextrose 50% Injectable 12.5 Gram(s) IV Push once  dextrose 50% Injectable 25 Gram(s) IV Push once  gabapentin 300 milliGRAM(s) Oral two times a day  glucagon  Injectable 1 milliGRAM(s) IntraMuscular once  insulin glargine Injectable (LANTUS) 56 Unit(s) SubCutaneous at bedtime  insulin lispro (ADMELOG) corrective regimen sliding scale   SubCutaneous three times a day before meals  insulin lispro (ADMELOG) corrective regimen sliding scale   SubCutaneous at bedtime  insulin lispro Injectable (ADMELOG) 13 Unit(s) SubCutaneous three times a day before meals  LORazepam     Tablet 0.5 milliGRAM(s) Oral once  metoprolol tartrate 25 milliGRAM(s) Oral two times a day  pantoprazole    Tablet 40 milliGRAM(s) Oral before breakfast    MEDICATIONS  (PRN):        Vitals:  Vital Signs Last 24 Hrs  T(C): 36.8 (22 May 2021 10:56), Max: 36.8 (22 May 2021 04:12)  T(F): 98.3 (22 May 2021 10:56), Max: 98.3 (22 May 2021 10:56)  HR: 86 (22 May 2021 10:56) (81 - 113)  BP: 130/79 (22 May 2021 10:56) (121/72 - 134/82)  BP(mean): --  RR: 18 (22 May 2021 10:56) (18 - 18)  SpO2: 95% (22 May 2021 10:56) (95% - 96%)    General Exam:   General Appearance: Appropriately dressed and in no acute distress       Head: Normocephalic, atraumatic and no dysmorphic features  Ear, Nose, and Throat: Moist mucous membranes  CVS: S1S2+  Resp: No SOB, no wheeze or rhonchi  Abd: soft NTND  Extremities: No edema, no cyanosis  Skin: No bruises, no rashes     Neurological Exam:  MS: Oriented x3. Fluent. Follows crossed commands.   CN: VFF. EOMI. V1-V3 intact. Face symmetric. T/u midline.   Motor: Full strength throughout.   Sensory: Intact to LT throughout   Reflexes: 2+ throughout. Babinski absent bilaterally.   Coordination: No dysmetria on FNF or ataxia on HTS bilaterally   Gait: Wide based gait    I personally reviewed the below data/images/labs:    CBC Full  -  ( 22 May 2021 06:04 )  WBC Count : 9.28 K/uL  RBC Count : 4.76 M/uL  Hemoglobin : 12.2 g/dL  Hematocrit : 37.7 %  Platelet Count - Automated : 293 K/uL  Mean Cell Volume : 79.2 fl  Mean Cell Hemoglobin : 25.6 pg  Mean Cell Hemoglobin Concentration : 32.4 gm/dL  Auto Neutrophil # : x  Auto Lymphocyte # : x  Auto Monocyte # : x  Auto Eosinophil # : x  Auto Basophil # : x  Auto Neutrophil % : x  Auto Lymphocyte % : x  Auto Monocyte % : x  Auto Eosinophil % : x  Auto Basophil % : x    05-21    136  |  99  |  15  ----------------------------<  243<H>  4.3   |  22  |  0.75    Ca    9.5      21 May 2021 05:58        < from: MR Head No Cont (05.21.21 @ 00:11) >    EXAM:  MR BRAIN                            PROCEDURE DATE:  05/21/2021            INTERPRETATION:  CLINICAL INDICATION: Status post cardiac catheterization with unsteady gait.    TECHNIQUE: Multi-planar multi-sequential MR imaging of the brain was performed without intravenous contrast.    COMPARISON: CT head, CTA head and neck 5/19/2021.    FINDINGS:    There are a few punctate T2/FLAIR hyperintense foci in the subcortical and periventricular white matter, nonspecific finding, but most likely representing sequelae of mild chronic microvascular ischemic disease.    No acute infarction, intracranial hemorrhage or mass.    There is no evidence of hydrocephalus. There are no extra-axial fluid collections. The skull base flow voids are patent.    The patient is status post bilateral lens replacement. There is a polyp/mucus retention cyst in the left maxillary sinus. There is a right mastoid air cell effusion. The left mastoid air cells are grossly clear. The visualized soft tissues and osseous structures appear unremarkable.    IMPRESSION:    Mild chronic microvascular ischemic disease.                DONAL GARCIA MD; Attending Radiologist  This document has been electronically signed. May 21 2021  9:39AM    < end of copied text >  < from: US Duplex Arterial Upper Ext Ltd, Left (05.19.21 @ 10:46) >    EXAM:  US DPLX UPR EXT ARTS LTD LT                            PROCEDURE DATE:  05/19/2021            INTERPRETATION:  History: Recent left radial artery catheterization, painful left upper extremity, evaluate for pseudoaneurysm.    Impression: Limited examination of the arteries of the left forearm.    The left radial artery is occluded, thrombosed.    There is no pseudoaneurysm or fistula      A complete arterial Doppler ultrasound examination of the left upper extremity is recommended.                KEITH JARRETT M.D., ATTENDING RADIOLOGIST  This document has been electronically signed. May 19 2021 10:02AM    < end of copied text >

## 2021-05-22 NOTE — PROGRESS NOTE ADULT - ASSESSMENT
67 y/o female PMH DM2, hyperlipidemia, HTN p/w worsening L forearm pain left since Thursday post angiogram. Pain radiates from L wrist to elbow, worse with palpation. Pt states she had an angiogram at [facility] on Friday 5/14 w/ Dr. Lisette Santillan that revealed only 30% stenosis with no intervention. Pt also endorses mild epigastric burning but otherwise denies fever, chills, CP, SOB, N/V, numbness, tingling, weakness of the extremity    poss cellulitis  - c/w ancef     thrombosis of left radial artery  - IV heparin per vascular  - ? change to oral a/c  - vascular to follow  - follow up CT    unsteady gait and difficulty ambulating since the cardiac cath. Exam non focal.   - seen by neuro  - CTH negative  - CTA h/n R. P1 severe stenosis, otherwise no stenoocclusive disease.   - r/o stroke, may have had iatrogenic stroke during cath.    - pt is on iv heparin  - lipitor 40mg daily  - MRI brain w/o done - neg for stroke  - PT/OT    uncontrolled diabetes  - fs qid  - hgb a1c 10.4  - insulin ss  - hold oral antidiabetic meds  - diabetic diet  - endo consult following    HTN  - c/w home meds
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
## 2021-05-23 ENCOUNTER — TRANSCRIPTION ENCOUNTER (OUTPATIENT)
Age: 66
End: 2021-05-23

## 2021-05-23 VITALS
OXYGEN SATURATION: 99 % | SYSTOLIC BLOOD PRESSURE: 121 MMHG | HEART RATE: 82 BPM | TEMPERATURE: 98 F | RESPIRATION RATE: 18 BRPM | DIASTOLIC BLOOD PRESSURE: 76 MMHG

## 2021-05-23 LAB
GLUCOSE BLDC GLUCOMTR-MCNC: 208 MG/DL — HIGH (ref 70–99)
GLUCOSE BLDC GLUCOMTR-MCNC: 236 MG/DL — HIGH (ref 70–99)
HCT VFR BLD CALC: 36.8 % — SIGNIFICANT CHANGE UP (ref 34.5–45)
HGB BLD-MCNC: 11.6 G/DL — SIGNIFICANT CHANGE UP (ref 11.5–15.5)
MCHC RBC-ENTMCNC: 25.2 PG — LOW (ref 27–34)
MCHC RBC-ENTMCNC: 31.5 GM/DL — LOW (ref 32–36)
MCV RBC AUTO: 80 FL — SIGNIFICANT CHANGE UP (ref 80–100)
NRBC # BLD: 0 /100 WBCS — SIGNIFICANT CHANGE UP (ref 0–0)
PLATELET # BLD AUTO: 279 K/UL — SIGNIFICANT CHANGE UP (ref 150–400)
RBC # BLD: 4.6 M/UL — SIGNIFICANT CHANGE UP (ref 3.8–5.2)
RBC # FLD: 14.1 % — SIGNIFICANT CHANGE UP (ref 10.3–14.5)
WBC # BLD: 8.57 K/UL — SIGNIFICANT CHANGE UP (ref 3.8–10.5)
WBC # FLD AUTO: 8.57 K/UL — SIGNIFICANT CHANGE UP (ref 3.8–10.5)

## 2021-05-23 PROCEDURE — 86769 SARS-COV-2 COVID-19 ANTIBODY: CPT

## 2021-05-23 PROCEDURE — 70450 CT HEAD/BRAIN W/O DYE: CPT

## 2021-05-23 PROCEDURE — 83690 ASSAY OF LIPASE: CPT

## 2021-05-23 PROCEDURE — 97165 OT EVAL LOW COMPLEX 30 MIN: CPT

## 2021-05-23 PROCEDURE — 73206 CT ANGIO UPR EXTRM W/O&W/DYE: CPT | Mod: LT

## 2021-05-23 PROCEDURE — 80061 LIPID PANEL: CPT

## 2021-05-23 PROCEDURE — 70496 CT ANGIOGRAPHY HEAD: CPT

## 2021-05-23 PROCEDURE — 83735 ASSAY OF MAGNESIUM: CPT

## 2021-05-23 PROCEDURE — 70498 CT ANGIOGRAPHY NECK: CPT

## 2021-05-23 PROCEDURE — U0003: CPT

## 2021-05-23 PROCEDURE — 86803 HEPATITIS C AB TEST: CPT

## 2021-05-23 PROCEDURE — 85027 COMPLETE CBC AUTOMATED: CPT

## 2021-05-23 PROCEDURE — 82962 GLUCOSE BLOOD TEST: CPT

## 2021-05-23 PROCEDURE — 70551 MRI BRAIN STEM W/O DYE: CPT

## 2021-05-23 PROCEDURE — 97161 PT EVAL LOW COMPLEX 20 MIN: CPT

## 2021-05-23 PROCEDURE — 83036 HEMOGLOBIN GLYCOSYLATED A1C: CPT

## 2021-05-23 PROCEDURE — 84100 ASSAY OF PHOSPHORUS: CPT

## 2021-05-23 PROCEDURE — 80048 BASIC METABOLIC PNL TOTAL CA: CPT

## 2021-05-23 PROCEDURE — U0005: CPT

## 2021-05-23 PROCEDURE — 85610 PROTHROMBIN TIME: CPT

## 2021-05-23 PROCEDURE — 82607 VITAMIN B-12: CPT

## 2021-05-23 PROCEDURE — 85730 THROMBOPLASTIN TIME PARTIAL: CPT

## 2021-05-23 PROCEDURE — 84443 ASSAY THYROID STIM HORMONE: CPT

## 2021-05-23 PROCEDURE — 99284 EMERGENCY DEPT VISIT MOD MDM: CPT

## 2021-05-23 PROCEDURE — 80053 COMPREHEN METABOLIC PANEL: CPT

## 2021-05-23 PROCEDURE — 82746 ASSAY OF FOLIC ACID SERUM: CPT

## 2021-05-23 PROCEDURE — 93931 UPPER EXTREMITY STUDY: CPT

## 2021-05-23 PROCEDURE — 99285 EMERGENCY DEPT VISIT HI MDM: CPT

## 2021-05-23 PROCEDURE — 85025 COMPLETE CBC W/AUTO DIFF WBC: CPT

## 2021-05-23 RX ORDER — ACETAMINOPHEN 500 MG
650 TABLET ORAL ONCE
Refills: 0 | Status: COMPLETED | OUTPATIENT
Start: 2021-05-23 | End: 2021-05-23

## 2021-05-23 RX ORDER — CEPHALEXIN 500 MG
1 CAPSULE ORAL
Qty: 15 | Refills: 0
Start: 2021-05-23 | End: 2021-05-27

## 2021-05-23 RX ORDER — ACETAMINOPHEN 500 MG
650 TABLET ORAL ONCE
Refills: 0 | Status: DISCONTINUED | OUTPATIENT
Start: 2021-05-23 | End: 2021-05-23

## 2021-05-23 RX ORDER — INSULIN DETEMIR 100/ML (3)
50 INSULIN PEN (ML) SUBCUTANEOUS
Qty: 0 | Refills: 0 | DISCHARGE

## 2021-05-23 RX ORDER — METOPROLOL TARTRATE 50 MG
1 TABLET ORAL
Qty: 0 | Refills: 0 | DISCHARGE

## 2021-05-23 RX ADMIN — Medication 25 MILLIGRAM(S): at 05:06

## 2021-05-23 RX ADMIN — Medication 100 MILLIGRAM(S): at 05:07

## 2021-05-23 RX ADMIN — Medication 2: at 08:02

## 2021-05-23 RX ADMIN — AMLODIPINE BESYLATE 2.5 MILLIGRAM(S): 2.5 TABLET ORAL at 05:06

## 2021-05-23 RX ADMIN — Medication 650 MILLIGRAM(S): at 01:32

## 2021-05-23 RX ADMIN — Medication 13 UNIT(S): at 12:08

## 2021-05-23 RX ADMIN — PANTOPRAZOLE SODIUM 40 MILLIGRAM(S): 20 TABLET, DELAYED RELEASE ORAL at 05:07

## 2021-05-23 RX ADMIN — GABAPENTIN 300 MILLIGRAM(S): 400 CAPSULE ORAL at 05:06

## 2021-05-23 RX ADMIN — Medication 81 MILLIGRAM(S): at 12:07

## 2021-05-23 RX ADMIN — Medication 13 UNIT(S): at 08:03

## 2021-05-23 RX ADMIN — Medication 2: at 12:07

## 2021-05-23 RX ADMIN — Medication 100 MILLIGRAM(S): at 14:42

## 2021-05-23 NOTE — PROGRESS NOTE ADULT - SUBJECTIVE AND OBJECTIVE BOX
Chief complaint  Patient is a 66y old  Female who presents with a chief complaint of Left upper extremity vascular thrombosis (radial artery) (21 May 2021 18:10)   Review of systems  Patient in bed, appears comfortable.    Labs and Fingersticks  CAPILLARY BLOOD GLUCOSE      POCT Blood Glucose.: 208 mg/dL (23 May 2021 11:34)  POCT Blood Glucose.: 236 mg/dL (23 May 2021 07:28)  POCT Blood Glucose.: 278 mg/dL (22 May 2021 21:00)  POCT Blood Glucose.: 213 mg/dL (22 May 2021 16:32)                        11.6   8.57  )-----------( 279      ( 23 May 2021 05:38 )             36.8     Medications  MEDICATIONS  (STANDING):  amLODIPine   Tablet 2.5 milliGRAM(s) Oral daily  aspirin enteric coated 81 milliGRAM(s) Oral daily  atorvastatin 40 milliGRAM(s) Oral at bedtime  ceFAZolin   IVPB      ceFAZolin   IVPB 1000 milliGRAM(s) IV Intermittent every 8 hours  dextrose 40% Gel 15 Gram(s) Oral once  dextrose 5%. 1000 milliLiter(s) (50 mL/Hr) IV Continuous <Continuous>  dextrose 5%. 1000 milliLiter(s) (100 mL/Hr) IV Continuous <Continuous>  dextrose 50% Injectable 25 Gram(s) IV Push once  dextrose 50% Injectable 12.5 Gram(s) IV Push once  dextrose 50% Injectable 25 Gram(s) IV Push once  gabapentin 300 milliGRAM(s) Oral two times a day  glucagon  Injectable 1 milliGRAM(s) IntraMuscular once  insulin glargine Injectable (LANTUS) 56 Unit(s) SubCutaneous at bedtime  insulin lispro (ADMELOG) corrective regimen sliding scale   SubCutaneous three times a day before meals  insulin lispro (ADMELOG) corrective regimen sliding scale   SubCutaneous at bedtime  insulin lispro Injectable (ADMELOG) 13 Unit(s) SubCutaneous three times a day before meals  LORazepam     Tablet 0.5 milliGRAM(s) Oral once  metoprolol tartrate 25 milliGRAM(s) Oral two times a day  pantoprazole    Tablet 40 milliGRAM(s) Oral before breakfast      Physical Exam  General: Patient comfortable in bed  Vital Signs Last 12 Hrs  T(F): 98.3 (05-23-21 @ 10:42), Max: 98.3 (05-23-21 @ 04:07)  HR: 82 (05-23-21 @ 10:42) (74 - 82)  BP: 121/76 (05-23-21 @ 10:42) (107/71 - 121/76)  BP(mean): --  RR: 18 (05-23-21 @ 10:42) (18 - 18)  SpO2: 99% (05-23-21 @ 10:42) (98% - 99%)  Neck: No palpable thyroid nodules.  CVS: S1S2, No murmurs  Respiratory: No wheezing, no crepitations  GI: Abdomen soft, bowel sounds positive  Musculoskeletal:  edema lower extremities.     Diagnostics

## 2021-05-23 NOTE — PROGRESS NOTE ADULT - PROBLEM SELECTOR PLAN 1
Will continue current insulin regimen for now. Will continue monitoring  blood sugars, will Follow up.  Suggest to DC home on current basal bolus insulin, stop all oral diabetes meds for now, FU 4 weeks. Plan discussed with patient.  Patient counseled for compliance with consistent low carb diet.
Will increase Lantus to 56u at bedtime.  Will increase Admelog to 13u before each meal and continue Admelog correction scale coverage. Will continue monitoring FS and FU.  Patient with poorly controlled DM, A1C 10.4%, will likely require adjustments to her DM regimen (Levemir, Metformin, Glimepiride). Will continue monitoring and FU DC recs.  Patient counseled for compliance with consistent low carb diet and exercise as tolerated outpatient.
Will increase Lantus to 52u at bedtime.  Will increase Admelog to 10u before each meal and continue Admelog correction scale coverage. Will continue monitoring FS and FU.  Patient with poorly controlled DM, A1C 10.4%, will likely require adjustments to her DM regimen (Levemir, Metformin, Glimepiride). Will continue monitoring and FU DC recs.  Patient counseled for compliance with consistent low carb diet and exercise as tolerated outpatient.

## 2021-05-23 NOTE — PROGRESS NOTE ADULT - PROBLEM SELECTOR PLAN 3
Suggest to continue medications, monitoring, FU primary team recommendations.
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
## 2021-05-23 NOTE — DISCHARGE NOTE PROVIDER - PROVIDER TOKENS
PROVIDER:[TOKEN:[[id number]],FOLLOWUP:[1 week]],PROVIDER:[TOKEN:[[id number]],FOLLOWUP:[1 week]],PROVIDER:[TOKEN:[[id number]],FOLLOWUP:[1 week]]

## 2021-05-23 NOTE — PROGRESS NOTE ADULT - ASSESSMENT
Assessment  DMT2: 66y Female with DM T2 with hyperglycemia, A1C 10.4%, was on oral meds and insulin at home, now on basal bolus blood sugars improving no hypoglycemic episodes, eating meals, appears alert and comfortable.  Thyroid Nodule: Known to patient, she reports having biopsy done 2 years ago, thinks results were benign, has FU appointment in June. Thyroid US reviewed. Euthyroid, denies dysphagia or compressive sxs.  Radial Artery Thrombosis: on medications, monitored, FU Vascular.  Obesity: No strict exercise routines, not on any weight loss program, neither on low calorie diet.        Michelle Diana MD  Cell: [phone]  Office: [phone]

## 2021-05-23 NOTE — CHART NOTE - NSCHARTNOTEFT_GEN_A_CORE
Request from Dr. Cervantes to facilitate patient discharge. Medication reconciliation reviewed, revised, and resolved with Dr. Cervantes who had medically cleared patient for discharge with follow-up as advised. Please refer to discharge note for detailed hospital course. Patient is currently stable for discharge to home at this time.    Also iscussed with patient regarding COVID vaccine prior to discharge  As per patient, she has already received both doses of Pfizer vaccine in 4/2021. Thus, patient does not qualify for COVID vaccine      Sheeba Moore PA-C  #[id number]  Dept of Medicine

## 2021-05-23 NOTE — PROGRESS NOTE ADULT - SUBJECTIVE AND OBJECTIVE BOX
DATE OF SERVICE: 05-23-21 @ 09:05    Patient is a 66y old  Female who presents with a chief complaint of Left upper extremity vascular thrombosis (radial artery) (21 May 2021 18:10)      SUBJECTIVE / OVERNIGHT EVENTS:  feeling much better    MEDICATIONS  (STANDING):  amLODIPine   Tablet 2.5 milliGRAM(s) Oral daily  aspirin enteric coated 81 milliGRAM(s) Oral daily  atorvastatin 40 milliGRAM(s) Oral at bedtime  ceFAZolin   IVPB      ceFAZolin   IVPB 1000 milliGRAM(s) IV Intermittent every 8 hours  dextrose 40% Gel 15 Gram(s) Oral once  dextrose 5%. 1000 milliLiter(s) (50 mL/Hr) IV Continuous <Continuous>  dextrose 5%. 1000 milliLiter(s) (100 mL/Hr) IV Continuous <Continuous>  dextrose 50% Injectable 25 Gram(s) IV Push once  dextrose 50% Injectable 12.5 Gram(s) IV Push once  dextrose 50% Injectable 25 Gram(s) IV Push once  gabapentin 300 milliGRAM(s) Oral two times a day  glucagon  Injectable 1 milliGRAM(s) IntraMuscular once  insulin glargine Injectable (LANTUS) 56 Unit(s) SubCutaneous at bedtime  insulin lispro (ADMELOG) corrective regimen sliding scale   SubCutaneous three times a day before meals  insulin lispro (ADMELOG) corrective regimen sliding scale   SubCutaneous at bedtime  insulin lispro Injectable (ADMELOG) 13 Unit(s) SubCutaneous three times a day before meals  LORazepam     Tablet 0.5 milliGRAM(s) Oral once  metoprolol tartrate 25 milliGRAM(s) Oral two times a day  pantoprazole    Tablet 40 milliGRAM(s) Oral before breakfast    MEDICATIONS  (PRN):      Vital Signs Last 24 Hrs  T(C): 36.8 (23 May 2021 04:07), Max: 36.8 (22 May 2021 10:56)  T(F): 98.3 (23 May 2021 04:07), Max: 98.3 (22 May 2021 10:56)  HR: 80 (23 May 2021 04:42) (74 - 99)  BP: 107/71 (23 May 2021 04:07) (107/71 - 148/78)  BP(mean): --  RR: 18 (23 May 2021 04:07) (18 - 18)  SpO2: 98% (23 May 2021 04:07) (95% - 98%)  CAPILLARY BLOOD GLUCOSE      POCT Blood Glucose.: 236 mg/dL (23 May 2021 07:28)  POCT Blood Glucose.: 278 mg/dL (22 May 2021 21:00)  POCT Blood Glucose.: 213 mg/dL (22 May 2021 16:32)  POCT Blood Glucose.: 250 mg/dL (22 May 2021 11:28)    I&O's Summary    22 May 2021 07:01  -  23 May 2021 07:00  --------------------------------------------------------  IN: 1010 mL / OUT: 0 mL / NET: 1010 mL        PHYSICAL EXAM:  GENERAL: NAD, well-developed  HEAD:  Atraumatic, Normocephalic  EYES: EOMI, PERRLA, conjunctiva and sclera clear  NECK: Supple, No JVD  CHEST/LUNG: Clear to auscultation bilaterally; No wheeze  HEART: Regular rate and rhythm; No murmurs, rubs, or gallops  ABDOMEN: Soft, Nontender, Nondistended; Bowel sounds present  EXTREMITIES:  WARMTH AND TENDERNESS OF LEFT WRIST HAS ALMOST RESOLVED  PSYCH: AAOx3  NEUROLOGY: non-focal  SKIN: No rashes or lesions    LABS:                        11.6   8.57  )-----------( 279      ( 23 May 2021 05:38 )             36.8           PT/INR - ( 22 May 2021 06:04 )   PT: 11.6 sec;   INR: 0.97 ratio         PTT - ( 22 May 2021 06:04 )  PTT:41.2 sec    < from: CT Angio Upper Extremity w/ IV Cont, Left (05.21.21 @ 23:31) >    INTERPRETATION:  CLINICAL INFORMATION: Radial artery thrombosis with worsening pain.    COMPARISON: Left upper extremity arterial Doppler 5/19/2021    CONTRAST/COMPLICATIONS:  IV Contrast: Omnipaque 350  125 cc administered   25 cc discarded  Oral Contrast: None  Complications: None    CT ANGIOGRAM UPPER EXTREMITY:    PROCEDURE:  Following the rapid administration of intravenous contrast, CT angiography was performed through the LEFT upper extremity from the aortic arch to the fingers.  Immediately after the initial arterial acquisition, delayed images were obtained from the elbow to the fingers. Sagittal and coronal reformats as well as 3Dreconstructions were performed.    FINDINGS:    Visualized portions of the aortic arch are within normal limits. Proximal left common carotid artery is widely patent. Left subclavian, axillary, and brachial arteries are widely patent. Occlusion of the radial artery in the mid forearm is again noted. Ulnar artery is patent as are palmar arteries.      < end of copied text >        RADIOLOGY & ADDITIONAL TESTS:    Imaging Personally Reviewed:    Consultant(s) Notes Reviewed:      Care Discussed with Consultants/Other Providers:

## 2021-05-23 NOTE — DISCHARGE NOTE PROVIDER - NSDCFUADDINST_GEN_ALL_CORE_FT
Follow-up with your primary care physician within 1 week. Call for appointment.  Please bring all discharge paperwork and list of medications to all follow up appointments  Please call for follow up appointments one day after discharge

## 2021-05-23 NOTE — PROGRESS NOTE ADULT - PROBLEM SELECTOR PLAN 4
Overweight/Obesity: Patient counseled for weight loss, exercise, low calorie diet.

## 2021-05-23 NOTE — PROGRESS NOTE ADULT - PROVIDER SPECIALTY LIST ADULT
Internal Medicine
Neurology
Vascular Surgery
Internal Medicine
Vascular Surgery
Neurology
Neurology
Endocrinology

## 2021-05-23 NOTE — PROGRESS NOTE ADULT - ASSESSMENT
67 y/o female PMH DM2, hyperlipidemia, HTN p/w worsening L forearm pain left since Thursday post angiogram. Pain radiates from L wrist to elbow, worse with palpation. Pt states she had an angiogram at [facility] on Friday 5/14 w/ Dr. Lisette Santillan that revealed only 30% stenosis with no intervention. Pt also endorses mild epigastric burning but otherwise denies fever, chills, CP, SOB, N/V, numbness, tingling, weakness of the extremity    poss cellulitis  - c/w ancef  - change to po keflex 500 tid x 5 days     thrombosis of left radial artery  - d/c IV heparin per vascular  - no need for a/c  - vascular signed off  -  CT done    unsteady gait and difficulty ambulating since the cardiac cath. Exam non focal.   - seen by neuro  - CTH negative  - CTA h/n R. P1 severe stenosis, otherwise no stenoocclusive disease.   - r/o stroke, may have had iatrogenic stroke during cath.    - lipitor 40mg daily  - MRI brain w/o done - neg for stroke  - asa 81 qd    uncontrolled diabetes  - fs qid  - hgb a1c 10.4  - insulin ss  - hold oral antidiabetic meds  - diabetic diet  - endo consult following    HTN  - c/w home meds    d/c home

## 2021-05-23 NOTE — DISCHARGE NOTE PROVIDER - NSDCCPCAREPLAN_GEN_ALL_CORE_FT
PRINCIPAL DISCHARGE DIAGNOSIS  Diagnosis: Thrombosis of arm, left  Assessment and Plan of Treatment: Duplex of left arm was significant for left radial artery thrombosis. Vascular surgery was consulted recommending no surgical intervention warranted at this time.   Plastic hand surgery was also consulted. CTA resulted in persistent thrombosis of the left radial artery in the mid forearm with no evidence of compartment syndrome at this time.   Patient was treated for possible cellulitis with IV antibiotics - to be discharged on oral keflex 500 three times a day for 5 days.   Neurology was also consulted as patient endorsed difficulty ambulating since the cardiac cath. CT head was negative. MRI Brain with Mild chronic microvascular ischemic disease.   Patient to follow up outpatient with Dr. Slim Tatum at [phone] and PCP withuin  1 week from discharge         SECONDARY DISCHARGE DIAGNOSES  Diagnosis: Thyroid nodule  Assessment and Plan of Treatment: Patient needs monitoring of thyroid nodules, possible repeat biopsy.  Please follow up with PCP and ENT within 1 week from discharge    Diagnosis: DM (Diabetes Mellitus)  Assessment and Plan of Treatment: HgA1c this admission was 10.4   Please follow up with your endocrinologist within 1 week from discharge  Make sure you get your HgA1c checked every three months.  If you take oral diabetes medications, check your blood glucose at least two times a day.  If you take short-acting insulin, check your blood glucose before meals and at bedtime.  It's important not to skip any meals.  Keep a log of your blood glucose results and always take it with you to your doctor appointments.  Keep a list of your current medications including over the counter medications and bring this medication list with you to all your doctor appointments.  If you have not seen your ophthalmologist this year, call for appointment.  Check your feet daily for redness, sores, or openings.  Do not self treat.  If there is no improvement in two days, call your primary care physician for an appointment.

## 2021-05-23 NOTE — DISCHARGE NOTE PROVIDER - NSDCMRMEDTOKEN_GEN_ALL_CORE_FT
aspirin 81 mg oral tablet: 1 tab(s) orally once a day  cyclobenzaprine 5 mg oral tablet: 1 tab(s) orally 2 times a day  gabapentin 300 mg oral capsule: 1 cap(s) orally 2 times a day  glimepiride 2 mg oral tablet: 1 tab(s) orally 2 times a day  Keflex 500 mg oral capsule: 1 cap(s) orally 3 times a day   Levemir 100 units/mL subcutaneous solution: 56 unit(s) subcutaneous once a day (at bedtime)  metFORMIN 500 mg oral tablet: 2 tab(s) orally 2 times a day  metoprolol tartrate 25 mg oral tablet: 1 tab(s) orally 2 times a day  NexIUM 20 mg oral delayed release capsule: 1 cap(s) orally once a day, As Needed    NOTE: Pharmacy directions - 1 cap 2 times daily.  Norvasc 2.5 mg oral tablet: 1 tab(s) orally once a day  Restasis 0.05% ophthalmic emulsion: 1 drop(s) to each affected eye every 12 hours, As Needed  simvastatin 10 mg oral tablet: 1 tab(s) orally once a day (at bedtime)

## 2021-05-23 NOTE — DISCHARGE NOTE PROVIDER - HOSPITAL COURSE
67 y/o female PMH DM2, hyperlipidemia, HTN presents with worsening left forearm pain left since Thursday post angiogram. Pt states she had an angiogram at [facility] on Friday 5/14 w/ Dr. Lisette Santillan that revealed 30% stenosis with no intervention. Patient was admitted for further medical management. Duplex of left arm was significant for left radial artery thrombosis, no signs of PSA. Vascular surgery was consulted recommending no surgical intervention warranted at this time. Plastic hand surgery was also consulted. CTA resulted in persistent thrombosis of the left radial artery in the mid forearm with no evidence of compartment syndrome at this time. Per vascular, patient can be discharged without AC. Patient was treated for possible cellulitis with ancef - to be discharged on po keflex 500 tid x 5 days. Neurology was also consulted as patient endorsed difficulty ambulating since the cardiac cath. CTH negative. MRI Brain with Mild chronic microvascular ischemic disease. Patient to follow up outpatient with Dr. Slim Tatum at [phone]. Endocrine was consulted for glycemic control recommending patient be discharged on Levemir 56 units at bedtime and continuation of other home regimen. PT/OT were consulted deeming patient functionally independent with no skilled OT/PT intervention needed.    Discharge/Dispo/Med rec discussed with attending. Patient medically cleared for discharge home with outpatient follow up with PCP, neurology, and endocrine.

## 2021-05-23 NOTE — DISCHARGE NOTE PROVIDER - CARE PROVIDER_API CALL
[facility]  [id number]  [address] [place] [address]  Phone: [phone]  Fax: [phone]  Follow Up Time: 1 week    Slim Tatum)  Neurology; Vascular Neurology  [address], [address]  [place] [address]  Phone: [phone]  Fax: [phone]  Follow Up Time: 1 week    Michelle Diana)  EndocrinologyMetabDiabetes; Internal Medicine  [address] [place] [address]  Phone: [phone]  Fax: [phone]  Follow Up Time: 1 week

## 2021-05-23 NOTE — PROGRESS NOTE ADULT - NSICDXPILOT_GEN_ALL_CORE
[place]

## 2021-05-23 NOTE — PROGRESS NOTE ADULT - SUBJECTIVE AND OBJECTIVE BOX
Neurology Progress Note    S: Patient seen and examined. No new events overnight. patient denied CP, SOB, HA or pain. LUE a bit better. off heparin drip on asa . d/c plan today     Medication:  MEDICATIONS  (STANDING):  amLODIPine   Tablet 2.5 milliGRAM(s) Oral daily  aspirin enteric coated 81 milliGRAM(s) Oral daily  atorvastatin 40 milliGRAM(s) Oral at bedtime  ceFAZolin   IVPB      ceFAZolin   IVPB 1000 milliGRAM(s) IV Intermittent every 8 hours  dextrose 40% Gel 15 Gram(s) Oral once  dextrose 5%. 1000 milliLiter(s) (50 mL/Hr) IV Continuous <Continuous>  dextrose 5%. 1000 milliLiter(s) (100 mL/Hr) IV Continuous <Continuous>  dextrose 50% Injectable 25 Gram(s) IV Push once  dextrose 50% Injectable 12.5 Gram(s) IV Push once  dextrose 50% Injectable 25 Gram(s) IV Push once  gabapentin 300 milliGRAM(s) Oral two times a day  glucagon  Injectable 1 milliGRAM(s) IntraMuscular once  insulin glargine Injectable (LANTUS) 56 Unit(s) SubCutaneous at bedtime  insulin lispro (ADMELOG) corrective regimen sliding scale   SubCutaneous three times a day before meals  insulin lispro (ADMELOG) corrective regimen sliding scale   SubCutaneous at bedtime  insulin lispro Injectable (ADMELOG) 13 Unit(s) SubCutaneous three times a day before meals  LORazepam     Tablet 0.5 milliGRAM(s) Oral once  metoprolol tartrate 25 milliGRAM(s) Oral two times a day  pantoprazole    Tablet 40 milliGRAM(s) Oral before breakfast    MEDICATIONS  (PRN):  acetaminophen   Tablet .. 650 milliGRAM(s) Oral once PRN Moderate Pain (4 - 6)          Vitals:  Vital Signs Last 24 Hrs  T(C): 36.8 (23 May 2021 10:42), Max: 36.8 (22 May 2021 20:13)  T(F): 98.3 (23 May 2021 10:42), Max: 98.3 (22 May 2021 20:13)  HR: 82 (23 May 2021 10:42) (74 - 99)  BP: 121/76 (23 May 2021 10:42) (107/71 - 148/78)  BP(mean): --  RR: 18 (23 May 2021 10:42) (18 - 18)  SpO2: 99% (23 May 2021 10:42) (95% - 99%)    General Exam:   General Appearance: Appropriately dressed and in no acute distress       Head: Normocephalic, atraumatic and no dysmorphic features  Ear, Nose, and Throat: Moist mucous membranes  CVS: S1S2+  Resp: No SOB, no wheeze or rhonchi  Abd: soft NTND  Extremities: No edema, no cyanosis  Skin: No bruises, no rashes     Neurological Exam:  MS: Oriented x3. Fluent. Follows crossed commands.   CN: VFF. EOMI. V1-V3 intact. Face symmetric. T/u midline.   Motor: Full strength throughout.   Sensory: Intact to LT throughout   Reflexes: 2+ throughout. Babinski absent bilaterally.   Coordination: No dysmetria on FNF or ataxia on HTS bilaterally   Gait: Wide based gait    I personally reviewed the below data/images/labs:    CBC Full  -  ( 23 May 2021 05:38 )  WBC Count : 8.57 K/uL  RBC Count : 4.60 M/uL  Hemoglobin : 11.6 g/dL  Hematocrit : 36.8 %  Platelet Count - Automated : 279 K/uL  Mean Cell Volume : 80.0 fl  Mean Cell Hemoglobin : 25.2 pg  Mean Cell Hemoglobin Concentration : 31.5 gm/dL  Auto Neutrophil # : x  Auto Lymphocyte # : x  Auto Monocyte # : x  Auto Eosinophil # : x  Auto Basophil # : x  Auto Neutrophil % : x  Auto Lymphocyte % : x  Auto Monocyte % : x  Auto Eosinophil % : x  Auto Basophil % : x      < from: MR Head No Cont (05.21.21 @ 00:11) >    EXAM:  MR BRAIN                            PROCEDURE DATE:  05/21/2021            INTERPRETATION:  CLINICAL INDICATION: Status post cardiac catheterization with unsteady gait.    TECHNIQUE: Multi-planar multi-sequential MR imaging of the brain was performed without intravenous contrast.    COMPARISON: CT head, CTA head and neck 5/19/2021.    FINDINGS:    There are a few punctate T2/FLAIR hyperintense foci in the subcortical and periventricular white matter, nonspecific finding, but most likely representing sequelae of mild chronic microvascular ischemic disease.    No acute infarction, intracranial hemorrhage or mass.    There is no evidence of hydrocephalus. There are no extra-axial fluid collections. The skull base flow voids are patent.    The patient is status post bilateral lens replacement. There is a polyp/mucus retention cyst in the left maxillary sinus. There is a right mastoid air cell effusion. The left mastoid air cells are grossly clear. The visualized soft tissues and osseous structures appear unremarkable.    IMPRESSION:    Mild chronic microvascular ischemic disease.                DONAL GARCIA MD; Attending Radiologist  This document has been electronically signed. May 21 2021  9:39AM    < end of copied text >  < from: US Duplex Arterial Upper Ext Ltd, Left (05.19.21 @ 10:46) >    EXAM:  US DPLX UPR EXT ARTS LTD LT                            PROCEDURE DATE:  05/19/2021            INTERPRETATION:  History: Recent left radial artery catheterization, painful left upper extremity, evaluate for pseudoaneurysm.    Impression: Limited examination of the arteries of the left forearm.    The left radial artery is occluded, thrombosed.    There is no pseudoaneurysm or fistula      A complete arterial Doppler ultrasound examination of the left upper extremity is recommended.                KEITH JARRETT M.D., ATTENDING RADIOLOGIST  This document has been electronically signed. May 19 2021 10:02AM    < end of copied text >

## 2021-10-20 NOTE — ED ADULT NURSE NOTE - FINAL NURSING ELECTRONIC SIGNATURE
How Severe Is Your Skin Lesion?: mild Is This A New Presentation, Or A Follow-Up?: Skin Lesion 19-May-2021 15:56

## 2022-01-27 ENCOUNTER — APPOINTMENT (OUTPATIENT)
Dept: OTOLARYNGOLOGY | Facility: CLINIC | Age: 67
End: 2022-01-27

## 2022-03-31 ENCOUNTER — APPOINTMENT (OUTPATIENT)
Dept: OTOLARYNGOLOGY | Facility: CLINIC | Age: 67
End: 2022-03-31
Payer: MEDICARE

## 2022-03-31 VITALS
HEIGHT: 63 IN | SYSTOLIC BLOOD PRESSURE: 149 MMHG | WEIGHT: 180 LBS | HEART RATE: 67 BPM | DIASTOLIC BLOOD PRESSURE: 74 MMHG | BODY MASS INDEX: 31.89 KG/M2

## 2022-03-31 PROCEDURE — 99214 OFFICE O/P EST MOD 30 MIN: CPT | Mod: 25

## 2022-03-31 PROCEDURE — 31579 LARYNGOSCOPY TELESCOPIC: CPT

## 2022-03-31 NOTE — REVIEW OF SYSTEMS
Called patient and informed her that at this time we do not have high dose flu shots in the clinic.  Patient prefers to do lab and flu shot at same time.  Put patient on flu shot list to call when it comes in.    [As Noted in HPI] : as noted in HPI [Negative] : Heme/Lymph

## 2022-03-31 NOTE — HISTORY OF PRESENT ILLNESS
[de-identified] : 67 year old female follow up for vocal fold paresis, history of Left neck mass and thyroid nodule, otalgia with clogged ears, LPR treated with esomeprazole, Thyroid US 3/20/21 shows similar single dominant nodule on right, previously biopsied, benign. Denies throat issues, voice changes.Denies throat pain, dysphagia, dyspnea, hemoptysis, recent fevers and throat infections. Reports bilateral clogged ears but pain has improved. Denies otalgia, otorrhea, hearing loss, recent ear infections. Had previous FNA, no recent u/s. Still uses nexium 2-3 times per week.\par \par

## 2022-03-31 NOTE — CONSULT LETTER
[Dear  ___] : Dear  [unfilled], [Consult Letter:] : I had the pleasure of evaluating your patient, [unfilled]. [Please see my note below.] : Please see my note below. [Consult Closing:] : Thank you very much for allowing me to participate in the care of this patient.  If you have any questions, please do not hesitate to contact me. [Sincerely,] : Sincerely, [FreeTextEntry2] : Dr. Natasha Angulo. [FreeTextEntry3] : Pierre Rueda MD, PhD\par Chief, Division of Laryngology\par Department of Otolaryngology\par [facility]\par Pediatric Otolaryngology, [facility]\par  of Otolaryngology\par [employer]\par

## 2022-04-20 ENCOUNTER — OUTPATIENT (OUTPATIENT)
Dept: OUTPATIENT SERVICES | Facility: HOSPITAL | Age: 67
LOS: 1 days | End: 2022-04-20
Payer: COMMERCIAL

## 2022-04-20 ENCOUNTER — APPOINTMENT (OUTPATIENT)
Dept: ULTRASOUND IMAGING | Facility: IMAGING CENTER | Age: 67
End: 2022-04-20
Payer: MEDICARE

## 2022-04-20 DIAGNOSIS — E04.9 NONTOXIC GOITER, UNSPECIFIED: ICD-10-CM

## 2022-04-20 PROCEDURE — 76536 US EXAM OF HEAD AND NECK: CPT | Mod: 26

## 2022-04-20 PROCEDURE — 76536 US EXAM OF HEAD AND NECK: CPT

## 2022-05-03 ENCOUNTER — APPOINTMENT (OUTPATIENT)
Dept: OTOLARYNGOLOGY | Facility: CLINIC | Age: 67
End: 2022-05-03

## 2022-05-03 NOTE — HISTORY OF PRESENT ILLNESS
[Home] : at home, [unfilled] , at the time of the visit. [Other Location: e.g. Home (Enter Location, City,State)___] : at [unfilled] [Verbal consent obtained from patient] : the patient, [unfilled] [de-identified] : 67 year old female follow up for vocal fold paresis, history of Left neck mass and thyroid nodule, otalgia with clogged ears, LPR treated with esomeprazole, Thyroid US 3/20/21 shows similar single dominant nodule on right, previously biopsied, benign. \par Denies throat issues, voice changes.Denies throat pain, dysphagia, dyspnea, hemoptysis, recent fevers and throat infections. Reports bilateral clogged ears but pain has improved. Denies otalgia, otorrhea, hearing loss, recent ear infections. Had previous FNA, no recent u/s. Still uses nexium 2-3 times per week.\par \par

## 2022-08-19 ENCOUNTER — APPOINTMENT (OUTPATIENT)
Dept: OTOLARYNGOLOGY | Facility: CLINIC | Age: 67
End: 2022-08-19

## 2022-09-09 ENCOUNTER — EMERGENCY (EMERGENCY)
Facility: HOSPITAL | Age: 67
LOS: 1 days | Discharge: HOME | End: 2022-09-09
Attending: EMERGENCY MEDICINE | Admitting: EMERGENCY MEDICINE
Payer: COMMERCIAL

## 2022-09-09 VITALS
DIASTOLIC BLOOD PRESSURE: 70 MMHG | TEMPERATURE: 98 F | WEIGHT: 175.05 LBS | HEIGHT: 62 IN | OXYGEN SATURATION: 99 % | HEART RATE: 94 BPM | RESPIRATION RATE: 18 BRPM | SYSTOLIC BLOOD PRESSURE: 173 MMHG

## 2022-09-09 VITALS
TEMPERATURE: 98 F | OXYGEN SATURATION: 99 % | HEART RATE: 84 BPM | RESPIRATION RATE: 18 BRPM | DIASTOLIC BLOOD PRESSURE: 74 MMHG | SYSTOLIC BLOOD PRESSURE: 149 MMHG

## 2022-09-09 PROCEDURE — 99283 EMERGENCY DEPT VISIT LOW MDM: CPT | Mod: 25

## 2022-09-09 PROCEDURE — 99284 EMERGENCY DEPT VISIT MOD MDM: CPT | Mod: 25

## 2022-09-09 PROCEDURE — 10060 I&D ABSCESS SIMPLE/SINGLE: CPT

## 2022-09-09 RX ORDER — LIDOCAINE HCL 20 MG/ML
10 VIAL (ML) INJECTION ONCE
Refills: 0 | Status: COMPLETED | OUTPATIENT
Start: 2022-09-09 | End: 2022-09-09

## 2022-09-09 RX ORDER — CYCLOBENZAPRINE HYDROCHLORIDE 10 MG/1
5 TABLET, FILM COATED ORAL ONCE
Refills: 0 | Status: COMPLETED | OUTPATIENT
Start: 2022-09-09 | End: 2022-09-09

## 2022-09-09 RX ORDER — IBUPROFEN 200 MG
400 TABLET ORAL ONCE
Refills: 0 | Status: COMPLETED | OUTPATIENT
Start: 2022-09-09 | End: 2022-09-09

## 2022-09-09 RX ORDER — ACETAMINOPHEN 500 MG
975 TABLET ORAL ONCE
Refills: 0 | Status: COMPLETED | OUTPATIENT
Start: 2022-09-09 | End: 2022-09-09

## 2022-09-09 RX ORDER — CYCLOBENZAPRINE HYDROCHLORIDE 10 MG/1
1 TABLET, FILM COATED ORAL
Qty: 9 | Refills: 0
Start: 2022-09-09 | End: 2022-09-11

## 2022-09-09 RX ORDER — LIDOCAINE 4 G/100G
1 CREAM TOPICAL ONCE
Refills: 0 | Status: COMPLETED | OUTPATIENT
Start: 2022-09-09 | End: 2022-09-09

## 2022-09-09 RX ADMIN — Medication 400 MILLIGRAM(S): at 07:30

## 2022-09-09 RX ADMIN — Medication 975 MILLIGRAM(S): at 07:30

## 2022-09-09 RX ADMIN — Medication 10 MILLILITER(S): at 08:14

## 2022-09-09 RX ADMIN — LIDOCAINE 1 PATCH: 4 CREAM TOPICAL at 07:31

## 2022-09-09 RX ADMIN — CYCLOBENZAPRINE HYDROCHLORIDE 5 MILLIGRAM(S): 10 TABLET, FILM COATED ORAL at 07:30

## 2022-09-09 NOTE — ED PROVIDER NOTE - NSFOLLOWUPINSTRUCTIONS_ED_ALL_ED_FT
You were seen today for an abscess and back pain. An incision and drainage was performed. Expect the wound to drain and heal with time. Please keep the area clean and dry. May use water and soap to clean.     For your back pain please take Tylenol and Motrin. Flexeril was sent to your pharmacy. Please follow up with your Spine surgeon for further management.     Please return to the ED if you have difficulty ambulating, worsening pain, loss of sensation, urinary retention or any other concerns.     Abscess    An abscess is an infected area that contains a collection of pus and debris. It can occur in almost any part of the body and occurs when the tissue gets infection. Symptoms include a painful mass that is red, warm, tender that might break open and have drainage. You received an incision and drainage procedure. Follow up with your pcp for further management.     SEEK MEDICAL CARE IF YOU HAVE THE FOLLOWING SYMPTOMS: chills, fever, muscle aches, or red streaking from the area.      Back Pain    Back pain is very common in adults. The cause of back pain is rarely dangerous and the pain often gets better over time. The cause of your back pain may not be known and may include strain of muscles or ligaments, degeneration of the spinal disks, or arthritis. Occasionally the pain may radiate down your leg(s). Over-the-counter medicines to reduce pain and inflammation are often the most helpful. Stretching and remaining active frequently helps the healing process.     SEEK IMMEDIATE MEDICAL CARE IF YOU HAVE THE FOLLOWING SYMPTOMS: bowel or bladder control problems, unusual weakness or numbness in your arms or legs, nausea or vomiting, abdominal pain, fever, dizziness/lightheadedness.

## 2022-09-09 NOTE — ED ADULT NURSE NOTE - OBJECTIVE STATEMENT
Pt is 67Y F, pmhx DM2, HTN, HLD, chronic back pain, c/o abscess in left inner thigh for 3 days. Pt endorses first noticing growth a 3 days ago, Pt states abscess has grown and become painful. Pt endorses attempting to burst abscess and that no discharge or pus has come from abscess. Pt endorses pain of 7/10 and has red abscess on inner thigh. Pt being treated for chronic back pain and states it is exacerbated by not being able to sit comfortably due to boil. Pt denies any SOB, fever, chills, chest pain, NVD, abdominal pain, urinary symptoms, or any other symptoms. Pt is A&Ox4, ambulatory, comfort and safety secured, updated on plan of care

## 2022-09-09 NOTE — ED PROVIDER NOTE - CLINICAL SUMMARY MEDICAL DECISION MAKING FREE TEXT BOX
67y female pt PMHx DM, HTN who presents to ED for back pain and an abscess. Patient endorsing she follows up w a spine doctor for chronic back pain and recently has had steroid injections done for pain management. Currently endorsing pain is mostly lower left and radiates down foot. Pain worse with ambulation. Of note, endorsing an abscess over L medial proximal thigh worsening since 1 week ago. Denies any drainage, f/c, urine retention, bowel incontinence. On exam with subjective numbness over LLE but motor strength intact throughout. Will do I&D and pain management. Reassess

## 2022-09-09 NOTE — ED PROVIDER NOTE - PATIENT PORTAL LINK FT
You can access the FollowMyHealth Patient Portal offered by [facility] by registering at the following website: [email or web address]. By joining [facility]’s FollowMyHealth portal, you will also be able to view your health information using other applications (apps) compatible with our system.

## 2022-09-09 NOTE — ED PROVIDER NOTE - OBJECTIVE STATEMENT
67y female pt PMHx HTN, DM, HLD who presents to ED for BL hip pain that is intermittent and radiates down legs. Endorsing pain worse with ambulation and for 1 week has been experiencing worsening difficulty to ambulate. Pain is sharp and worsening since 1 week ago now a/w L sided numbness. Of note, endorsing L medial thigh abscess that has been worsening since 1 week ago.

## 2022-09-09 NOTE — ED PROVIDER NOTE - ATTENDING CONTRIBUTION TO CARE
67y female pt PMHx HTN, DM, HLD who presents to ED for BL hip pain that is intermittent and radiates down legs with no signs of trauma worse with no motor deficits noted, does not radiate down both legs, with no change in bowel or bladder habits, no saddle anesthesia. pt able to ambulate with no red flags such as h/o cancer or concern for epidural abscess, pain control improved, also with small abscess to post thigh region, no surrounding erythema noted, I & d performed, no packing.

## 2022-09-09 NOTE — ED PROVIDER NOTE - PHYSICAL EXAMINATION
GENERAL: Awake, alert, NAD  HEENT: NC/AT, moist mucous membranes, EOMI  LUNGS: CTAB, no wheezes or crackles   CARDIAC: RRR, no m/r/g  ABDOMEN: Soft, non tender, non distended, no rebound, no guarding  BACK: BL sacral paraspinal tenderness to palpation L>R. subjective numbness on left lower extremity. leg raise L>R  EXT: No edema, no calf tenderness, 2+ PT pulses bilaterally, no deformities. Motor strength intact throughout 5/5  NEURO: A&Ox3. Moving all extremities.  SKIN: Warm and dry. No rash.  PSYCH: Normal affect.

## 2022-09-09 NOTE — ED PROVIDER NOTE - NS ED ROS FT
CONST: no fevers, no chills  EYES: no pain, no vision changes  ENT: no sore throat, no ear pain, no change in hearing  CV: no chest pain, no leg swelling  RESP: no shortness of breath, no cough  ABD: no abdominal pain, no nausea, no vomiting, no diarrhea  : no dysuria, no flank pain, no hematuria  MSK: +BL hip/sacral pain  NEURO: +numbness  SKIN: +abscess, erythema, edema

## 2022-12-13 ENCOUNTER — APPOINTMENT (OUTPATIENT)
Dept: OTOLARYNGOLOGY | Facility: CLINIC | Age: 67
End: 2022-12-13

## 2022-12-13 VITALS
HEART RATE: 77 BPM | SYSTOLIC BLOOD PRESSURE: 133 MMHG | WEIGHT: 180 LBS | BODY MASS INDEX: 31.89 KG/M2 | DIASTOLIC BLOOD PRESSURE: 79 MMHG | HEIGHT: 63 IN

## 2022-12-13 PROCEDURE — 99214 OFFICE O/P EST MOD 30 MIN: CPT | Mod: 25

## 2022-12-13 PROCEDURE — 31579 LARYNGOSCOPY TELESCOPIC: CPT

## 2022-12-13 RX ORDER — ESOMEPRAZOLE MAGNESIUM 20 MG/1
20 CAPSULE, DELAYED RELEASE ORAL DAILY
Qty: 30 | Refills: 3 | Status: COMPLETED | COMMUNITY
Start: 2021-03-04 | End: 2022-12-13

## 2022-12-13 RX ORDER — GLIMEPIRIDE 2 MG/1
2 TABLET ORAL
Qty: 180 | Refills: 0 | Status: COMPLETED | COMMUNITY
Start: 2022-10-04

## 2022-12-13 RX ORDER — FLUOROMETHOLONE 1 MG/ML
0.1 SOLUTION/ DROPS OPHTHALMIC
Qty: 5 | Refills: 0 | Status: COMPLETED | COMMUNITY
Start: 2022-08-19

## 2022-12-13 RX ORDER — AMOXICILLIN AND CLAVULANATE POTASSIUM 500; 125 MG/1; MG/1
500-125 TABLET, FILM COATED ORAL
Qty: 10 | Refills: 0 | Status: COMPLETED | COMMUNITY
Start: 2022-09-10

## 2022-12-13 RX ORDER — LANCETS 30 GAUGE
EACH MISCELLANEOUS
Qty: 100 | Refills: 0 | Status: COMPLETED | COMMUNITY
Start: 2022-10-17

## 2022-12-13 RX ORDER — CYCLOBENZAPRINE HYDROCHLORIDE 5 MG/1
5 TABLET, FILM COATED ORAL
Qty: 14 | Refills: 0 | Status: COMPLETED | COMMUNITY
Start: 2022-10-01

## 2022-12-13 RX ORDER — GLIMEPIRIDE 2 MG/1
2 TABLET ORAL
Refills: 0 | Status: ACTIVE | COMMUNITY

## 2022-12-13 RX ORDER — FAMOTIDINE 40 MG/5ML
40 POWDER, FOR SUSPENSION ORAL
Qty: 450 | Refills: 0 | Status: ACTIVE | COMMUNITY
Start: 2022-12-02

## 2022-12-13 RX ORDER — CIPROFLOXACIN HYDROCHLORIDE 500 MG/1
500 TABLET, FILM COATED ORAL
Qty: 10 | Refills: 0 | Status: COMPLETED | COMMUNITY
Start: 2022-10-17

## 2022-12-13 RX ORDER — BLOOD SUGAR DIAGNOSTIC
STRIP MISCELLANEOUS
Qty: 150 | Refills: 0 | Status: COMPLETED | COMMUNITY
Start: 2022-10-18

## 2022-12-13 RX ORDER — OMEPRAZOLE 20 MG/1
20 CAPSULE, DELAYED RELEASE ORAL DAILY
Qty: 30 | Refills: 3 | Status: COMPLETED | COMMUNITY
Start: 2021-03-17 | End: 2022-12-13

## 2022-12-13 NOTE — HISTORY OF PRESENT ILLNESS
[de-identified] : 67 year old female, following up for thyroid mass. \par History of Left neck mass and thyroid nodule\par Recent flu about 2-3 weeks ago- residual coughing\par Reports bilateral clogged ears. \par Continues to use Nexium with relief. \par Denies otalgia, otorrhea. \par Following right thyroid nodule, smaller in 2022 than in 2021\par +nasal congestion\par

## 2023-01-20 ENCOUNTER — INPATIENT (INPATIENT)
Facility: HOSPITAL | Age: 68
LOS: 1 days | Discharge: HOME | DRG: 690 | End: 2023-01-22
Attending: INTERNAL MEDICINE | Admitting: INTERNAL MEDICINE
Payer: COMMERCIAL

## 2023-01-20 VITALS
DIASTOLIC BLOOD PRESSURE: 72 MMHG | HEIGHT: 65 IN | TEMPERATURE: 98 F | HEART RATE: 88 BPM | WEIGHT: 188.94 LBS | OXYGEN SATURATION: 97 % | RESPIRATION RATE: 18 BRPM | SYSTOLIC BLOOD PRESSURE: 145 MMHG

## 2023-01-20 LAB
ALBUMIN SERPL ELPH-MCNC: 4.7 G/DL — SIGNIFICANT CHANGE UP (ref 3.3–5)
ALP SERPL-CCNC: 79 U/L — SIGNIFICANT CHANGE UP (ref 40–120)
ALT FLD-CCNC: 53 U/L — HIGH (ref 10–45)
ANION GAP SERPL CALC-SCNC: 15 MMOL/L — SIGNIFICANT CHANGE UP (ref 5–17)
APPEARANCE UR: ABNORMAL
AST SERPL-CCNC: 48 U/L — HIGH (ref 10–40)
BACTERIA # UR AUTO: NEGATIVE — SIGNIFICANT CHANGE UP
BASE EXCESS BLDV CALC-SCNC: 1.9 MMOL/L — SIGNIFICANT CHANGE UP (ref -2–3)
BASE EXCESS BLDV CALC-SCNC: 2.2 MMOL/L — SIGNIFICANT CHANGE UP (ref -2–3)
BASOPHILS # BLD AUTO: 0.08 K/UL — SIGNIFICANT CHANGE UP (ref 0–0.2)
BASOPHILS NFR BLD AUTO: 0.5 % — SIGNIFICANT CHANGE UP (ref 0–2)
BILIRUB SERPL-MCNC: 0.3 MG/DL — SIGNIFICANT CHANGE UP (ref 0.2–1.2)
BILIRUB UR-MCNC: NEGATIVE — SIGNIFICANT CHANGE UP
BUN SERPL-MCNC: 15 MG/DL — SIGNIFICANT CHANGE UP (ref 7–23)
CA-I SERPL-SCNC: 1.21 MMOL/L — SIGNIFICANT CHANGE UP (ref 1.15–1.33)
CA-I SERPL-SCNC: 1.27 MMOL/L — SIGNIFICANT CHANGE UP (ref 1.15–1.33)
CALCIUM SERPL-MCNC: 10.4 MG/DL — SIGNIFICANT CHANGE UP (ref 8.4–10.5)
CHLORIDE BLDV-SCNC: 102 MMOL/L — SIGNIFICANT CHANGE UP (ref 96–108)
CHLORIDE BLDV-SCNC: 103 MMOL/L — SIGNIFICANT CHANGE UP (ref 96–108)
CHLORIDE SERPL-SCNC: 99 MMOL/L — SIGNIFICANT CHANGE UP (ref 96–108)
CO2 BLDV-SCNC: 30 MMOL/L — HIGH (ref 22–26)
CO2 BLDV-SCNC: 30 MMOL/L — HIGH (ref 22–26)
CO2 SERPL-SCNC: 24 MMOL/L — SIGNIFICANT CHANGE UP (ref 22–31)
COLOR SPEC: YELLOW — SIGNIFICANT CHANGE UP
CREAT SERPL-MCNC: 0.66 MG/DL — SIGNIFICANT CHANGE UP (ref 0.5–1.3)
DIFF PNL FLD: ABNORMAL
EGFR: 97 ML/MIN/1.73M2 — SIGNIFICANT CHANGE UP
EOSINOPHIL # BLD AUTO: 0.43 K/UL — SIGNIFICANT CHANGE UP (ref 0–0.5)
EOSINOPHIL NFR BLD AUTO: 2.9 % — SIGNIFICANT CHANGE UP (ref 0–6)
EPI CELLS # UR: 1 /HPF — SIGNIFICANT CHANGE UP
GAS PNL BLDV: 136 MMOL/L — SIGNIFICANT CHANGE UP (ref 136–145)
GAS PNL BLDV: 136 MMOL/L — SIGNIFICANT CHANGE UP (ref 136–145)
GAS PNL BLDV: SIGNIFICANT CHANGE UP
GAS PNL BLDV: SIGNIFICANT CHANGE UP
GLUCOSE BLDC GLUCOMTR-MCNC: 135 MG/DL — HIGH (ref 70–99)
GLUCOSE BLDC GLUCOMTR-MCNC: 289 MG/DL — HIGH (ref 70–99)
GLUCOSE BLDV-MCNC: 118 MG/DL — HIGH (ref 70–99)
GLUCOSE BLDV-MCNC: 162 MG/DL — HIGH (ref 70–99)
GLUCOSE SERPL-MCNC: 168 MG/DL — HIGH (ref 70–99)
GLUCOSE UR QL: NEGATIVE — SIGNIFICANT CHANGE UP
HCO3 BLDV-SCNC: 29 MMOL/L — SIGNIFICANT CHANGE UP (ref 22–29)
HCO3 BLDV-SCNC: 29 MMOL/L — SIGNIFICANT CHANGE UP (ref 22–29)
HCT VFR BLD CALC: 38.1 % — SIGNIFICANT CHANGE UP (ref 34.5–45)
HCT VFR BLDA CALC: 37 % — SIGNIFICANT CHANGE UP (ref 34.5–46.5)
HCT VFR BLDA CALC: 38 % — SIGNIFICANT CHANGE UP (ref 34.5–46.5)
HGB BLD CALC-MCNC: 12.2 G/DL — SIGNIFICANT CHANGE UP (ref 11.7–16.1)
HGB BLD CALC-MCNC: 12.5 G/DL — SIGNIFICANT CHANGE UP (ref 11.7–16.1)
HGB BLD-MCNC: 12.2 G/DL — SIGNIFICANT CHANGE UP (ref 11.5–15.5)
HYALINE CASTS # UR AUTO: 1 /LPF — SIGNIFICANT CHANGE UP (ref 0–2)
IMM GRANULOCYTES NFR BLD AUTO: 0.4 % — SIGNIFICANT CHANGE UP (ref 0–0.9)
KETONES UR-MCNC: NEGATIVE — SIGNIFICANT CHANGE UP
LACTATE BLDV-MCNC: 2.3 MMOL/L — HIGH (ref 0.5–2)
LACTATE BLDV-MCNC: 2.5 MMOL/L — HIGH (ref 0.5–2)
LEUKOCYTE ESTERASE UR-ACNC: ABNORMAL
LYMPHOCYTES # BLD AUTO: 26.4 % — SIGNIFICANT CHANGE UP (ref 13–44)
LYMPHOCYTES # BLD AUTO: 3.98 K/UL — HIGH (ref 1–3.3)
MCHC RBC-ENTMCNC: 25.8 PG — LOW (ref 27–34)
MCHC RBC-ENTMCNC: 32 GM/DL — SIGNIFICANT CHANGE UP (ref 32–36)
MCV RBC AUTO: 80.5 FL — SIGNIFICANT CHANGE UP (ref 80–100)
MONOCYTES # BLD AUTO: 0.81 K/UL — SIGNIFICANT CHANGE UP (ref 0–0.9)
MONOCYTES NFR BLD AUTO: 5.4 % — SIGNIFICANT CHANGE UP (ref 2–14)
NEUTROPHILS # BLD AUTO: 9.71 K/UL — HIGH (ref 1.8–7.4)
NEUTROPHILS NFR BLD AUTO: 64.4 % — SIGNIFICANT CHANGE UP (ref 43–77)
NITRITE UR-MCNC: NEGATIVE — SIGNIFICANT CHANGE UP
NRBC # BLD: 0 /100 WBCS — SIGNIFICANT CHANGE UP (ref 0–0)
PCO2 BLDV: 52 MMHG — HIGH (ref 39–42)
PCO2 BLDV: 53 MMHG — HIGH (ref 39–42)
PH BLDV: 7.34 — SIGNIFICANT CHANGE UP (ref 7.32–7.43)
PH BLDV: 7.35 — SIGNIFICANT CHANGE UP (ref 7.32–7.43)
PH UR: 5.5 — SIGNIFICANT CHANGE UP (ref 5–8)
PLATELET # BLD AUTO: 346 K/UL — SIGNIFICANT CHANGE UP (ref 150–400)
PO2 BLDV: 28 MMHG — SIGNIFICANT CHANGE UP (ref 25–45)
PO2 BLDV: 35 MMHG — SIGNIFICANT CHANGE UP (ref 25–45)
POTASSIUM BLDV-SCNC: 4.3 MMOL/L — SIGNIFICANT CHANGE UP (ref 3.5–5.1)
POTASSIUM BLDV-SCNC: 4.3 MMOL/L — SIGNIFICANT CHANGE UP (ref 3.5–5.1)
POTASSIUM SERPL-MCNC: 4.3 MMOL/L — SIGNIFICANT CHANGE UP (ref 3.5–5.3)
POTASSIUM SERPL-SCNC: 4.3 MMOL/L — SIGNIFICANT CHANGE UP (ref 3.5–5.3)
PROT SERPL-MCNC: 8.7 G/DL — HIGH (ref 6–8.3)
PROT UR-MCNC: ABNORMAL
RBC # BLD: 4.73 M/UL — SIGNIFICANT CHANGE UP (ref 3.8–5.2)
RBC # FLD: 13.7 % — SIGNIFICANT CHANGE UP (ref 10.3–14.5)
RBC CASTS # UR COMP ASSIST: 17 /HPF — HIGH (ref 0–4)
SAO2 % BLDV: 36.7 % — LOW (ref 67–88)
SAO2 % BLDV: 50.9 % — LOW (ref 67–88)
SODIUM SERPL-SCNC: 138 MMOL/L — SIGNIFICANT CHANGE UP (ref 135–145)
SP GR SPEC: 1.02 — SIGNIFICANT CHANGE UP (ref 1.01–1.02)
UROBILINOGEN FLD QL: NEGATIVE — SIGNIFICANT CHANGE UP
WBC # BLD: 15.07 K/UL — HIGH (ref 3.8–10.5)
WBC # FLD AUTO: 15.07 K/UL — HIGH (ref 3.8–10.5)
WBC UR QL: 149 /HPF — HIGH (ref 0–5)

## 2023-01-20 PROCEDURE — 99223 1ST HOSP IP/OBS HIGH 75: CPT

## 2023-01-20 RX ORDER — CYCLOBENZAPRINE HYDROCHLORIDE 10 MG/1
1 TABLET, FILM COATED ORAL
Qty: 0 | Refills: 0 | DISCHARGE

## 2023-01-20 RX ORDER — SODIUM CHLORIDE 9 MG/ML
1000 INJECTION INTRAMUSCULAR; INTRAVENOUS; SUBCUTANEOUS
Refills: 0 | Status: DISCONTINUED | OUTPATIENT
Start: 2023-01-20 | End: 2023-01-21

## 2023-01-20 RX ORDER — ESOMEPRAZOLE MAGNESIUM 40 MG/1
1 CAPSULE, DELAYED RELEASE ORAL
Qty: 0 | Refills: 0 | DISCHARGE

## 2023-01-20 RX ORDER — CEFTRIAXONE 500 MG/1
1000 INJECTION, POWDER, FOR SOLUTION INTRAMUSCULAR; INTRAVENOUS ONCE
Refills: 0 | Status: COMPLETED | OUTPATIENT
Start: 2023-01-20 | End: 2023-01-20

## 2023-01-20 RX ORDER — AMLODIPINE BESYLATE 2.5 MG/1
2.5 TABLET ORAL DAILY
Refills: 0 | Status: DISCONTINUED | OUTPATIENT
Start: 2023-01-20 | End: 2023-01-22

## 2023-01-20 RX ORDER — INSULIN GLARGINE 100 [IU]/ML
50 INJECTION, SOLUTION SUBCUTANEOUS AT BEDTIME
Refills: 0 | Status: DISCONTINUED | OUTPATIENT
Start: 2023-01-20 | End: 2023-01-22

## 2023-01-20 RX ORDER — DEXTROSE 50 % IN WATER 50 %
25 SYRINGE (ML) INTRAVENOUS ONCE
Refills: 0 | Status: DISCONTINUED | OUTPATIENT
Start: 2023-01-20 | End: 2023-01-22

## 2023-01-20 RX ORDER — GLUCAGON INJECTION, SOLUTION 0.5 MG/.1ML
1 INJECTION, SOLUTION SUBCUTANEOUS ONCE
Refills: 0 | Status: DISCONTINUED | OUTPATIENT
Start: 2023-01-20 | End: 2023-01-22

## 2023-01-20 RX ORDER — SODIUM CHLORIDE 9 MG/ML
1000 INJECTION INTRAMUSCULAR; INTRAVENOUS; SUBCUTANEOUS ONCE
Refills: 0 | Status: COMPLETED | OUTPATIENT
Start: 2023-01-20 | End: 2023-01-20

## 2023-01-20 RX ORDER — CYCLOSPORINE 0.5 MG/ML
1 EMULSION OPHTHALMIC
Qty: 0 | Refills: 0 | DISCHARGE

## 2023-01-20 RX ORDER — INSULIN LISPRO 100/ML
VIAL (ML) SUBCUTANEOUS
Refills: 0 | Status: DISCONTINUED | OUTPATIENT
Start: 2023-01-20 | End: 2023-01-22

## 2023-01-20 RX ORDER — DEXTROSE 50 % IN WATER 50 %
12.5 SYRINGE (ML) INTRAVENOUS ONCE
Refills: 0 | Status: DISCONTINUED | OUTPATIENT
Start: 2023-01-20 | End: 2023-01-22

## 2023-01-20 RX ORDER — CEFTRIAXONE 500 MG/1
1000 INJECTION, POWDER, FOR SOLUTION INTRAMUSCULAR; INTRAVENOUS EVERY 12 HOURS
Refills: 0 | Status: DISCONTINUED | OUTPATIENT
Start: 2023-01-20 | End: 2023-01-21

## 2023-01-20 RX ORDER — AMLODIPINE BESYLATE 2.5 MG/1
1 TABLET ORAL
Qty: 0 | Refills: 0 | DISCHARGE

## 2023-01-20 RX ORDER — ASPIRIN/CALCIUM CARB/MAGNESIUM 324 MG
81 TABLET ORAL DAILY
Refills: 0 | Status: DISCONTINUED | OUTPATIENT
Start: 2023-01-20 | End: 2023-01-22

## 2023-01-20 RX ORDER — ASPIRIN/CALCIUM CARB/MAGNESIUM 324 MG
1 TABLET ORAL
Qty: 0 | Refills: 0 | DISCHARGE

## 2023-01-20 RX ORDER — DEXTROSE 50 % IN WATER 50 %
15 SYRINGE (ML) INTRAVENOUS ONCE
Refills: 0 | Status: DISCONTINUED | OUTPATIENT
Start: 2023-01-20 | End: 2023-01-22

## 2023-01-20 RX ORDER — INSULIN DETEMIR 100/ML (3)
56 INSULIN PEN (ML) SUBCUTANEOUS
Qty: 0 | Refills: 0 | DISCHARGE

## 2023-01-20 RX ORDER — GABAPENTIN 400 MG/1
1 CAPSULE ORAL
Qty: 0 | Refills: 0 | DISCHARGE

## 2023-01-20 RX ORDER — PANTOPRAZOLE SODIUM 20 MG/1
40 TABLET, DELAYED RELEASE ORAL
Refills: 0 | Status: DISCONTINUED | OUTPATIENT
Start: 2023-01-20 | End: 2023-01-22

## 2023-01-20 RX ORDER — SIMVASTATIN 20 MG/1
10 TABLET, FILM COATED ORAL AT BEDTIME
Refills: 0 | Status: DISCONTINUED | OUTPATIENT
Start: 2023-01-20 | End: 2023-01-22

## 2023-01-20 RX ORDER — METOPROLOL TARTRATE 50 MG
25 TABLET ORAL
Refills: 0 | Status: DISCONTINUED | OUTPATIENT
Start: 2023-01-20 | End: 2023-01-22

## 2023-01-20 RX ORDER — SODIUM CHLORIDE 9 MG/ML
1000 INJECTION, SOLUTION INTRAVENOUS
Refills: 0 | Status: DISCONTINUED | OUTPATIENT
Start: 2023-01-20 | End: 2023-01-22

## 2023-01-20 RX ORDER — GABAPENTIN 400 MG/1
300 CAPSULE ORAL
Refills: 0 | Status: DISCONTINUED | OUTPATIENT
Start: 2023-01-20 | End: 2023-01-22

## 2023-01-20 RX ORDER — LISINOPRIL 2.5 MG/1
5 TABLET ORAL DAILY
Refills: 0 | Status: DISCONTINUED | OUTPATIENT
Start: 2023-01-20 | End: 2023-01-22

## 2023-01-20 RX ADMIN — CEFTRIAXONE 100 MILLIGRAM(S): 500 INJECTION, POWDER, FOR SOLUTION INTRAMUSCULAR; INTRAVENOUS at 14:10

## 2023-01-20 RX ADMIN — SIMVASTATIN 10 MILLIGRAM(S): 20 TABLET, FILM COATED ORAL at 22:00

## 2023-01-20 RX ADMIN — SODIUM CHLORIDE 1000 MILLILITER(S): 9 INJECTION INTRAMUSCULAR; INTRAVENOUS; SUBCUTANEOUS at 13:08

## 2023-01-20 RX ADMIN — SODIUM CHLORIDE 1000 MILLILITER(S): 9 INJECTION INTRAMUSCULAR; INTRAVENOUS; SUBCUTANEOUS at 17:11

## 2023-01-20 RX ADMIN — SODIUM CHLORIDE 100 MILLILITER(S): 9 INJECTION INTRAMUSCULAR; INTRAVENOUS; SUBCUTANEOUS at 19:36

## 2023-01-20 RX ADMIN — INSULIN GLARGINE 50 UNIT(S): 100 INJECTION, SOLUTION SUBCUTANEOUS at 22:00

## 2023-01-20 RX ADMIN — SODIUM CHLORIDE 1000 MILLILITER(S): 9 INJECTION INTRAMUSCULAR; INTRAVENOUS; SUBCUTANEOUS at 17:21

## 2023-01-20 RX ADMIN — CEFTRIAXONE 1000 MILLIGRAM(S): 500 INJECTION, POWDER, FOR SOLUTION INTRAMUSCULAR; INTRAVENOUS at 17:11

## 2023-01-20 NOTE — ED CDU PROVIDER INITIAL DAY NOTE - NS ED ATTENDING STATEMENT MOD
This was a shared visit with the TATI. I reviewed and verified the documentation and independently performed the documented:

## 2023-01-20 NOTE — ED ADULT NURSE NOTE - CARDIO ASSESSMENT
--- Odomzo Counseling- I discussed with the patient the risks of Odomzo including but not limited to nausea, vomiting, diarrhea, constipation, weight loss, changes in the sense of taste, decreased appetite, muscle spasms, and hair loss.  The patient verbalized understanding of the proper use and possible adverse effects of Odomzo.  All of the patient's questions and concerns were addressed.

## 2023-01-20 NOTE — ED PROVIDER NOTE - ATTENDING CONTRIBUTION TO CARE
Dr. Finley: I have personally performed a face to face bedside history and physical examination of this patient. I have discussed the history, examination, review of systems, assessment and plan of management with the resident. I have reviewed the electronic medical record and amended it to reflect my history, review of systems, physical exam, assessment and plan.    Dr. Finley:  67F h/o HTN, DM, HLD, sexually active 2 weeks ago with one partner, p/w 1 day of suprapubic pain, dysuria, hematuria, urinary frequency. No vaginal bleeding or DC, no fevers or chills, no back pain, no h/o kidney stones.    On exam pt well appearing, in NAD, heart RRR, lungs CTAB, abd NTND, no CVAT, extremities without swelling, strength 5/5 in all extremities and skin without rash.     My DDx includes UTI vs pyelo (unlikely as no flank pain and fever) vs kidney stone (unlikely as no sharp component to pain and no flank pain)    Labs were ordered and independently reviewed and demonstrate leukocytosis indicative of infection    Medications and tests considered but not ordered include CT a/p - pt with no abdominal or flank ttp so will avoid CT at this time    Escalation of care considered includes admission for IV abx if pt meets sepsis criteria Dr. Finley: I have personally performed a face to face bedside history and physical examination of this patient. I have discussed the history, examination, review of systems, assessment and plan of management with the resident. I have reviewed the electronic medical record and amended it to reflect my history, review of systems, physical exam, assessment and plan.    Dr. Finley:  67F h/o HTN, DM, HLD, sexually active 2 weeks ago with one partner, p/w 1 day of suprapubic pain, dysuria, hematuria, urinary frequency. No vaginal bleeding or DC, no fevers or chills, no back pain, no h/o kidney stones.    On exam pt well appearing, in NAD, heart RRR, lungs CTAB, abd NTND, no CVAT, extremities without swelling, strength 5/5 in all extremities and skin without rash.     My DDx includes UTI vs pyelo (unlikely as no flank pain and fever) vs kidney stone (unlikely as no sharp component to pain and no flank pain)    Labs were ordered and independently reviewed and demonstrate leukocytosis indicative of infection. Lactate not clearing with IVF. D/w pt, will place in CDU for abx and IVF    Medications and tests considered but not ordered include CT a/p - pt with no abdominal or flank ttp so will avoid CT at this time    Escalation of care considered includes admission for IV abx if pt meets sepsis criteria. Given pt is well appearing, will place in CDU

## 2023-01-20 NOTE — ED ADULT NURSE NOTE - VOIDING
dysuric/frequency/oliguric/stress incontinence burning with urination/dysuric/frequency/stress incontinence

## 2023-01-20 NOTE — ED ADULT NURSE NOTE - OBJECTIVE STATEMENT
69y female presents to ED c/o pressure in the pelvic area, vaginal pain while urinating, and hematuria. States that she urinates very little and it hurts while urinating, + blood on tissue when wiping. A & O x 4, in no acute distress, breathing even and unlabored on room air, skin is warm and dry, color is appropriate for ethnicity, abdomen is soft, nondistended, + suprapubic tenderness, ambulatory. Denies any fever, chills, shortness of breath, nausea, vomiting, or diarrhea. Fall precautions implemented and discussed with patient. Evaluation in progress, will continue to monitor. 69y female presents to ED c/o pelvic pain, urinary frequency, dysuria, and hematuria. States that she urinates very little and it hurts while urinating, + blood on tissue when wiping. A & O x 4, in no acute distress, breathing even and unlabored on room air, skin is warm and dry, color is appropriate for ethnicity, abdomen is soft, nontender, nondistended, ambulatory. Denies any fever, chills, shortness of breath, nausea, vomiting, or diarrhea. Fall precautions implemented and discussed with patient. Evaluation in progress, will continue to monitor.

## 2023-01-20 NOTE — ED PROVIDER NOTE - PROGRESS NOTE DETAILS
Carolyn Phillips, PGY1, MD: pt having a rising lactate despite fluid bolus, concern for evolving sepsis. consulted cdu for possible admission, will come evaluate. pt amendable to staying. blood cultures, urince cx sent, abx started

## 2023-01-20 NOTE — ED PROVIDER NOTE - CLINICAL SUMMARY MEDICAL DECISION MAKING FREE TEXT BOX
67 year old female hx of HTN, DM, HLD presenting for burning dysuria, hematuria, and increased urinary frequency onset this morning, without n/v, abdominal pain, flank pain, fever, chills. Afebrile, hemodynamically stable with no CVA or abdominal ttp on exam.    Likely hemorrhagic cystitis, less likely kidney stone given lack of pain, low concern for pyelo and urosepsis given lack of systemic sx and no cva or suprapubic tenderness. Will get UA, Ucx, cbc, cmp, vbg. if positive will give abx and d/c with pcp f/u

## 2023-01-20 NOTE — ED CDU PROVIDER INITIAL DAY NOTE - ATTENDING APP SHARED VISIT CONTRIBUTION OF CARE
Dr. Finley: I performed a face to face bedside interview with patient regarding history of present illness, review of symptoms and past medical history. I completed an independent physical exam.  I have discussed patient's plan of care with PA.   I agree with note as stated above, having amended the EMR as needed to reflect my findings.   This includes HISTORY OF PRESENT ILLNESS, HIV, PAST MEDICAL/SURGICAL/FAMILY/SOCIAL HISTORY, ALLERGIES AND HOME MEDICATIONS, REVIEW OF SYSTEMS, PHYSICAL EXAM, and any PROGRESS NOTES during the time I functioned as the attending physician for this patient.    see mdm

## 2023-01-20 NOTE — ED CDU PROVIDER INITIAL DAY NOTE - CLINICAL SUMMARY MEDICAL DECISION MAKING FREE TEXT BOX
67F h/o HTN, DM, HLD, sexually active 2 weeks ago with one partner, p/w 1 day of suprapubic pain, dysuria, hematuria, urinary frequency. No vaginal bleeding or DC, no fevers or chills, no back pain, no h/o kidney stones.  On exam pt well appearing, in NAD, heart RRR, lungs CTAB, abd NTND, no CVAT, extremities without swelling, strength 5/5 in all extremities and skin without rash.   My DDx includes UTI vs pyelo (unlikely as no flank pain and fever) vs kidney stone (unlikely as no sharp component to pain and no flank pain)  Labs were ordered and independently reviewed and demonstrate leukocytosis indicative of infection. Lactate not clearing with IVF. Thus pt to be placed in CDU for IVF and IV abx  Medications and tests considered but not ordered include CT a/p - pt with no abdominal or flank ttp so will avoid CT at this time  Escalation of care considered includes admission if pt does not improve after 24 hrs in the CDU

## 2023-01-20 NOTE — ED PROVIDER NOTE - OBJECTIVE STATEMENT
67 year old female hx of HTN, DM, HLD presenting for burning dysuria, hematuria, and increased urinary frequency onset this morning. Pt denies fever, chills, abdominal pain, flank pain, n/v/d, recent sexual activity, vaginal discharge. Pt reports she has had UTIs in the past which present similarly except she has never had hematuria before.

## 2023-01-20 NOTE — ED PROVIDER NOTE - PHYSICAL EXAMINATION
GENERAL: well appearing in no acute distress, non-toxic appearing  HEAD: normocephalic, atraumatic  HEENT: normal conjunctiva, oral mucosa moist,   CARDIAC: regular rate and rhythm, no appreciable murmurs, 2+ pulses in UE/LE b/l  PULM: normal breath sounds, clear to ascultation bilaterally, no rales, rhonchi, wheezing  GI: abdomen nondistended, soft, nontender, no guarding, rebound tenderness  : no CVA tenderness b/l, no suprapubic tenderness  NEURO: no focal motor or sensory deficits, , AAOx3  MSK: no peripheral edema, no calf tenderness b/l  SKIN: well-perfused, extremities warm, no visible rashes  PSYCH: appropriate mood and affect

## 2023-01-21 DIAGNOSIS — I10 ESSENTIAL (PRIMARY) HYPERTENSION: ICD-10-CM

## 2023-01-21 DIAGNOSIS — E11.65 TYPE 2 DIABETES MELLITUS WITH HYPERGLYCEMIA: ICD-10-CM

## 2023-01-21 DIAGNOSIS — E78.5 HYPERLIPIDEMIA, UNSPECIFIED: ICD-10-CM

## 2023-01-21 DIAGNOSIS — N30.01 ACUTE CYSTITIS WITH HEMATURIA: ICD-10-CM

## 2023-01-21 DIAGNOSIS — D64.9 ANEMIA, UNSPECIFIED: ICD-10-CM

## 2023-01-21 DIAGNOSIS — E87.20 ACIDOSIS, UNSPECIFIED: ICD-10-CM

## 2023-01-21 DIAGNOSIS — N30.90 CYSTITIS, UNSPECIFIED WITHOUT HEMATURIA: ICD-10-CM

## 2023-01-21 DIAGNOSIS — Z29.9 ENCOUNTER FOR PROPHYLACTIC MEASURES, UNSPECIFIED: ICD-10-CM

## 2023-01-21 LAB
A1C WITH ESTIMATED AVERAGE GLUCOSE RESULT: 9.1 % — HIGH (ref 4–5.6)
ALBUMIN SERPL ELPH-MCNC: 3.5 G/DL — SIGNIFICANT CHANGE UP (ref 3.3–5)
ALP SERPL-CCNC: 61 U/L — SIGNIFICANT CHANGE UP (ref 40–120)
ALT FLD-CCNC: 33 U/L — SIGNIFICANT CHANGE UP (ref 10–45)
ANION GAP SERPL CALC-SCNC: 12 MMOL/L — SIGNIFICANT CHANGE UP (ref 5–17)
AST SERPL-CCNC: 27 U/L — SIGNIFICANT CHANGE UP (ref 10–40)
BASE EXCESS BLDV CALC-SCNC: -0.2 MMOL/L — SIGNIFICANT CHANGE UP (ref -2–3)
BILIRUB SERPL-MCNC: 0.1 MG/DL — LOW (ref 0.2–1.2)
BLD GP AB SCN SERPL QL: NEGATIVE — SIGNIFICANT CHANGE UP
BUN SERPL-MCNC: 13 MG/DL — SIGNIFICANT CHANGE UP (ref 7–23)
CA-I SERPL-SCNC: 1.2 MMOL/L — SIGNIFICANT CHANGE UP (ref 1.15–1.33)
CALCIUM SERPL-MCNC: 8.6 MG/DL — SIGNIFICANT CHANGE UP (ref 8.4–10.5)
CHLORIDE BLDV-SCNC: 106 MMOL/L — SIGNIFICANT CHANGE UP (ref 96–108)
CHLORIDE SERPL-SCNC: 106 MMOL/L — SIGNIFICANT CHANGE UP (ref 96–108)
CO2 BLDV-SCNC: 28 MMOL/L — HIGH (ref 22–26)
CO2 SERPL-SCNC: 21 MMOL/L — LOW (ref 22–31)
CREAT SERPL-MCNC: 0.58 MG/DL — SIGNIFICANT CHANGE UP (ref 0.5–1.3)
EGFR: 100 ML/MIN/1.73M2 — SIGNIFICANT CHANGE UP
ESTIMATED AVERAGE GLUCOSE: 214 MG/DL — HIGH (ref 68–114)
FLUAV AG NPH QL: SIGNIFICANT CHANGE UP
FLUBV AG NPH QL: SIGNIFICANT CHANGE UP
GAS PNL BLDV: 138 MMOL/L — SIGNIFICANT CHANGE UP (ref 136–145)
GAS PNL BLDV: SIGNIFICANT CHANGE UP
GLUCOSE BLDC GLUCOMTR-MCNC: 148 MG/DL — HIGH (ref 70–99)
GLUCOSE BLDC GLUCOMTR-MCNC: 164 MG/DL — HIGH (ref 70–99)
GLUCOSE BLDC GLUCOMTR-MCNC: 168 MG/DL — HIGH (ref 70–99)
GLUCOSE BLDC GLUCOMTR-MCNC: 261 MG/DL — HIGH (ref 70–99)
GLUCOSE BLDV-MCNC: 205 MG/DL — HIGH (ref 70–99)
GLUCOSE SERPL-MCNC: 188 MG/DL — HIGH (ref 70–99)
HCO3 BLDV-SCNC: 26 MMOL/L — SIGNIFICANT CHANGE UP (ref 22–29)
HCT VFR BLD CALC: 30.9 % — LOW (ref 34.5–45)
HCT VFR BLD CALC: 31.4 % — LOW (ref 34.5–45)
HCT VFR BLDA CALC: 31 % — LOW (ref 34.5–46.5)
HGB BLD CALC-MCNC: 10.2 G/DL — LOW (ref 11.7–16.1)
HGB BLD-MCNC: 9.6 G/DL — LOW (ref 11.5–15.5)
HGB BLD-MCNC: 9.7 G/DL — LOW (ref 11.5–15.5)
LACTATE BLDV-MCNC: 2.6 MMOL/L — HIGH (ref 0.5–2)
LACTATE SERPL-SCNC: 1.7 MMOL/L — SIGNIFICANT CHANGE UP (ref 0.5–2)
MCHC RBC-ENTMCNC: 25.2 PG — LOW (ref 27–34)
MCHC RBC-ENTMCNC: 25.7 PG — LOW (ref 27–34)
MCHC RBC-ENTMCNC: 30.6 GM/DL — LOW (ref 32–36)
MCHC RBC-ENTMCNC: 31.4 GM/DL — LOW (ref 32–36)
MCV RBC AUTO: 82 FL — SIGNIFICANT CHANGE UP (ref 80–100)
MCV RBC AUTO: 82.4 FL — SIGNIFICANT CHANGE UP (ref 80–100)
MRSA PCR RESULT.: DETECTED
NRBC # BLD: 0 /100 WBCS — SIGNIFICANT CHANGE UP (ref 0–0)
NRBC # BLD: 0 /100 WBCS — SIGNIFICANT CHANGE UP (ref 0–0)
PCO2 BLDV: 51 MMHG — HIGH (ref 39–42)
PH BLDV: 7.32 — SIGNIFICANT CHANGE UP (ref 7.32–7.43)
PLATELET # BLD AUTO: 259 K/UL — SIGNIFICANT CHANGE UP (ref 150–400)
PLATELET # BLD AUTO: 263 K/UL — SIGNIFICANT CHANGE UP (ref 150–400)
PO2 BLDV: 38 MMHG — SIGNIFICANT CHANGE UP (ref 25–45)
POTASSIUM BLDV-SCNC: 4.1 MMOL/L — SIGNIFICANT CHANGE UP (ref 3.5–5.1)
POTASSIUM SERPL-MCNC: 4.3 MMOL/L — SIGNIFICANT CHANGE UP (ref 3.5–5.3)
POTASSIUM SERPL-SCNC: 4.3 MMOL/L — SIGNIFICANT CHANGE UP (ref 3.5–5.3)
PROT SERPL-MCNC: 6.4 G/DL — SIGNIFICANT CHANGE UP (ref 6–8.3)
RBC # BLD: 3.77 M/UL — LOW (ref 3.8–5.2)
RBC # BLD: 3.81 M/UL — SIGNIFICANT CHANGE UP (ref 3.8–5.2)
RBC # FLD: 13.8 % — SIGNIFICANT CHANGE UP (ref 10.3–14.5)
RBC # FLD: 13.9 % — SIGNIFICANT CHANGE UP (ref 10.3–14.5)
RH IG SCN BLD-IMP: POSITIVE — SIGNIFICANT CHANGE UP
RSV RNA NPH QL NAA+NON-PROBE: SIGNIFICANT CHANGE UP
S AUREUS DNA NOSE QL NAA+PROBE: DETECTED
SAO2 % BLDV: 59.3 % — LOW (ref 67–88)
SARS-COV-2 RNA SPEC QL NAA+PROBE: SIGNIFICANT CHANGE UP
SODIUM SERPL-SCNC: 139 MMOL/L — SIGNIFICANT CHANGE UP (ref 135–145)
WBC # BLD: 9.1 K/UL — SIGNIFICANT CHANGE UP (ref 3.8–10.5)
WBC # BLD: 9.5 K/UL — SIGNIFICANT CHANGE UP (ref 3.8–10.5)
WBC # FLD AUTO: 9.1 K/UL — SIGNIFICANT CHANGE UP (ref 3.8–10.5)
WBC # FLD AUTO: 9.5 K/UL — SIGNIFICANT CHANGE UP (ref 3.8–10.5)

## 2023-01-21 PROCEDURE — 99233 SBSQ HOSP IP/OBS HIGH 50: CPT

## 2023-01-21 PROCEDURE — 99222 1ST HOSP IP/OBS MODERATE 55: CPT

## 2023-01-21 RX ORDER — INFLUENZA VIRUS VACCINE 15; 15; 15; 15 UG/.5ML; UG/.5ML; UG/.5ML; UG/.5ML
0.7 SUSPENSION INTRAMUSCULAR ONCE
Refills: 0 | Status: DISCONTINUED | OUTPATIENT
Start: 2023-01-21 | End: 2023-01-22

## 2023-01-21 RX ORDER — CEFDINIR 250 MG/5ML
1 POWDER, FOR SUSPENSION ORAL
Qty: 28 | Refills: 0
Start: 2023-01-21 | End: 2023-02-03

## 2023-01-21 RX ORDER — CEFTRIAXONE 500 MG/1
2000 INJECTION, POWDER, FOR SOLUTION INTRAMUSCULAR; INTRAVENOUS EVERY 24 HOURS
Refills: 0 | Status: DISCONTINUED | OUTPATIENT
Start: 2023-01-21 | End: 2023-01-22

## 2023-01-21 RX ORDER — SODIUM CHLORIDE 9 MG/ML
1000 INJECTION, SOLUTION INTRAVENOUS
Refills: 0 | Status: DISCONTINUED | OUTPATIENT
Start: 2023-01-21 | End: 2023-01-22

## 2023-01-21 RX ORDER — PHENAZOPYRIDINE HCL 100 MG
100 TABLET ORAL EVERY 8 HOURS
Refills: 0 | Status: COMPLETED | OUTPATIENT
Start: 2023-01-21 | End: 2023-01-22

## 2023-01-21 RX ORDER — LISINOPRIL 2.5 MG/1
1 TABLET ORAL
Qty: 0 | Refills: 0 | DISCHARGE

## 2023-01-21 RX ADMIN — CEFTRIAXONE 1000 MILLIGRAM(S): 500 INJECTION, POWDER, FOR SOLUTION INTRAMUSCULAR; INTRAVENOUS at 01:20

## 2023-01-21 RX ADMIN — Medication 0: at 08:39

## 2023-01-21 RX ADMIN — CEFTRIAXONE 100 MILLIGRAM(S): 500 INJECTION, POWDER, FOR SOLUTION INTRAMUSCULAR; INTRAVENOUS at 12:16

## 2023-01-21 RX ADMIN — GABAPENTIN 300 MILLIGRAM(S): 400 CAPSULE ORAL at 18:52

## 2023-01-21 RX ADMIN — Medication 81 MILLIGRAM(S): at 12:15

## 2023-01-21 RX ADMIN — SODIUM CHLORIDE 100 MILLILITER(S): 9 INJECTION, SOLUTION INTRAVENOUS at 14:10

## 2023-01-21 RX ADMIN — Medication 25 MILLIGRAM(S): at 18:55

## 2023-01-21 RX ADMIN — Medication 25 MILLIGRAM(S): at 08:43

## 2023-01-21 RX ADMIN — AMLODIPINE BESYLATE 2.5 MILLIGRAM(S): 2.5 TABLET ORAL at 05:10

## 2023-01-21 RX ADMIN — PANTOPRAZOLE SODIUM 40 MILLIGRAM(S): 20 TABLET, DELAYED RELEASE ORAL at 08:33

## 2023-01-21 RX ADMIN — Medication 100 MILLIGRAM(S): at 16:02

## 2023-01-21 RX ADMIN — SODIUM CHLORIDE 100 MILLILITER(S): 9 INJECTION INTRAMUSCULAR; INTRAVENOUS; SUBCUTANEOUS at 05:24

## 2023-01-21 RX ADMIN — GABAPENTIN 300 MILLIGRAM(S): 400 CAPSULE ORAL at 05:09

## 2023-01-21 RX ADMIN — Medication 100 MILLIGRAM(S): at 23:31

## 2023-01-21 RX ADMIN — LISINOPRIL 5 MILLIGRAM(S): 2.5 TABLET ORAL at 08:43

## 2023-01-21 RX ADMIN — Medication 2: at 18:53

## 2023-01-21 RX ADMIN — SODIUM CHLORIDE 1000 MILLILITER(S): 9 INJECTION INTRAMUSCULAR; INTRAVENOUS; SUBCUTANEOUS at 05:23

## 2023-01-21 RX ADMIN — SIMVASTATIN 10 MILLIGRAM(S): 20 TABLET, FILM COATED ORAL at 22:31

## 2023-01-21 RX ADMIN — INSULIN GLARGINE 50 UNIT(S): 100 INJECTION, SOLUTION SUBCUTANEOUS at 22:30

## 2023-01-21 RX ADMIN — Medication 2: at 14:22

## 2023-01-21 RX ADMIN — CEFTRIAXONE 100 MILLIGRAM(S): 500 INJECTION, POWDER, FOR SOLUTION INTRAMUSCULAR; INTRAVENOUS at 00:51

## 2023-01-21 NOTE — H&P ADULT - PROBLEM SELECTOR PLAN 1
UA + for leuk esterase w/ wbcs and sxs  -started on ceftriaxone (1/20 - )  -f/u UCx  -pyridium for symptoms

## 2023-01-21 NOTE — H&P ADULT - NSHPREVIEWOFSYSTEMS_GEN_ALL_CORE
CONSTITUTIONAL/GENERAL: No weakness, fevers or chills  EYES/OPHTHALMOLOGIC: No visual changes, No blurry vision, No vertigo   ENMT: No throat pain, or neck stiffness   RESPIRATORY/THORAX: No cough, wheezing, hemoptysis; No shortness of breath  CARDIOVASCULAR: No chest pain or palpitations  GASTROINTESTINAL: No abdominal or epigastric pain. No nausea, vomiting, or hematemesis; No diarrhea or constipation. No melena or hematochezia.   GENITOURINARY: No dysuria, frequency or hematuria any more. endorses vaginal "discomfort'   NEUROLOGICAL: No numbness or weakness  SKIN: No itching, rashes  ENDOCRINOLOGY: no heat intolerance, no cold intolerance, no weight gain, no weight loss  PSYCHIATRIC:  no si/no hi, mood stable, no anxiety  MUSCULOSKELETAL SYSTEM:  no joint pain, no myalgias, no arthralgias, no joint swelling

## 2023-01-21 NOTE — H&P ADULT - HISTORY OF PRESENT ILLNESS
67yof HTN, DM, HLD who presented to the ED initially for dysuria. Patient states that 1/20 in the AM, she had burning with urination, lower abdominal heaviness and lower abdominal/suprapubic pain. States that this has happened to her before. She said medications were not helping her with her pain. She denies any nausea, vomiting, shortness of breath, chest pain, cough, headaches. She states that for the past one or two years, she has been having pain with intercourse, tried estrogen vaginal creams with initial relief but them self discontinued them when she was no longer experiencing relief. She still has occasional itchiness. Denies any fevers, lower back pain, diarrhea, constipation, or rectal pain..    In the ED, UA was positive for leuk esterase, lactate was not improving despite IVF, and was admitted for further workup

## 2023-01-21 NOTE — ED CDU PROVIDER SUBSEQUENT DAY NOTE - CLINICAL SUMMARY MEDICAL DECISION MAKING FREE TEXT BOX
Adult female w dm on metformin pw pyelo in cdu for obs and abx, Now much improved stable for dc.  Simon Garay MD, Facep

## 2023-01-21 NOTE — ED CDU PROVIDER SUBSEQUENT DAY NOTE - HISTORY
CDU PROGRESS NOTE MARC MCKINNEY: no interval change. pt resting comfortably, no acute complaints. NAD. VSS. Will continue to monitor

## 2023-01-21 NOTE — H&P ADULT - PROBLEM SELECTOR PLAN 3
A1c 9.1, poorly controlled   -c/w lantus 50 qD  -nutrition consult  -consider endo consult if not controlled  -holding home meds

## 2023-01-21 NOTE — H&P ADULT - NSHPPHYSICALEXAM_GEN_ALL_CORE
Vital Signs Last 24 Hrs  T(C): 36.4 (21 Jan 2023 11:52), Max: 36.8 (20 Jan 2023 17:42)  T(F): 97.6 (21 Jan 2023 11:52), Max: 98.3 (20 Jan 2023 17:42)  HR: 66 (21 Jan 2023 11:52) (66 - 83)  BP: 118/72 (21 Jan 2023 11:52) (105/58 - 149/77)  BP(mean): 71 (21 Jan 2023 04:45) (71 - 72)  RR: 16 (21 Jan 2023 11:52) (16 - 19)  SpO2: 98% (21 Jan 2023 11:52) (96% - 100%)    Parameters below as of 21 Jan 2023 11:52  Patient On (Oxygen Delivery Method): room air

## 2023-01-21 NOTE — H&P ADULT - ASSESSMENT
67yof HTN, DM, HLD who presented to the ED initially for dysuria. Patient states that 1/20 in the AM, she had burning with urination, lower abdominal heaviness and lower abdominal/suprapubic pain, admitted for UTI and lactic acidosis.

## 2023-01-21 NOTE — ED ADULT NURSE REASSESSMENT NOTE - NS ED NURSE REASSESS COMMENT FT1
07.00 Am Received the Pt from  DUNIA Ortiz . Pt is Observed for Cystitis . Received the Pt A&OX 4 obeys commands Marcia N/V/D fever chills cp SOB   Comfort care & safety measures continued  IV site looks clean & dry no signs of infiltration noted pt denies  pain IV site .  Pt is advised to call for help  call bell with in the reach pt verbalized the understanding .  pending CDU  MD freed . GCS 15/15 A&OX 4 PERRLA  size 3 Strong upper & lower extremities steady gait   No facial droop  No Hand Leg drop denies numbness tingling  Pt is not in pain Continue to monitor
Received report from DUNIA Duran in CDU. Pt A&Ox4, aware of plan of care. Denies complaints at this time. Safety and comfort maintained, call bell within reach. IV sit clean, dry and intact, No redness noted, pt denies pain to site.
Report given to DUNIA Ordoñez in 4 DSU
Received pt from DUNIA Helms, pt aox4, comfort and safety maintained, FS monitored, IV abx given, Continuous IV fluids given, no complaints of pain. Will continue with the plan of care.

## 2023-01-21 NOTE — H&P ADULT - NSHPSOCIALHISTORY_GEN_ALL_CORE
Marital status:  to   Living situation: lives at home  Occupation: previously worked as HHA  Tobacco Use: denies any tobacco use  Alcohol Use: denies etoh intake  Drug use: denies marijuana, cocaine, heroine and other illicit drug use

## 2023-01-21 NOTE — ED CDU PROVIDER DISPOSITION NOTE - PATIENT PORTAL LINK FT
You can access the FollowMyHealth Patient Portal offered by [facility] by registering at the following website: [email or web address]. By joining [employer]’s FollowMyHealth portal, you will also be able to view your health information using other applications (apps) compatible with our system.

## 2023-01-21 NOTE — ED CDU PROVIDER DISPOSITION NOTE - ATTENDING APP SHARED VISIT CONTRIBUTION OF CARE
Adult female w dm on metformin pw pyelo and elevated lactate Has improved clinically and feels more comfortable. To spite this lactate has risen. Discussed with endocrine and felt not to be metformin related. Will admit for further sheldon  Simon Garay MD, Facep

## 2023-01-21 NOTE — ED CDU PROVIDER DISPOSITION NOTE - NSFOLLOWUPINSTRUCTIONS_ED_ALL_ED_FT
Please follow up with your primary care doctor within 1 week.    *Bring all printed lab/test results to your appointment(s).*    Read printed educational paperwork on CYSTITIS.    Take antibiotics as prescribed.  (Please read all medication information/instructions).    Return to the ED for worsening urinary symptoms, abdominal pain, fever, chills, or any other concerns. Please follow up with your primary care doctor within 1 week regarding your elevated lactate likely due to metformin.    *Bring all printed lab/test results to your appointment(s).*    Read printed educational paperwork on CYSTITIS.    Take antibiotics as prescribed.  (Please read all medication information/instructions).    Return to the ED for worsening urinary symptoms, abdominal pain, fever, chills, or any other concerns.

## 2023-01-21 NOTE — ED CDU PROVIDER SUBSEQUENT DAY NOTE - ATTENDING APP SHARED VISIT CONTRIBUTION OF CARE
67y female pmh DM on metformin. HTN,HLD. Pt came to ED for complains of dysuria,frequency, w small amt gross hematuria. No nvdc pt dx Pyelo in cdu for abx and now reports feeling much better. PE WDWN female awake alert normocephalic atraumatic neck supple chest clear anterior & posterior cv no rubs, gallops or murmurs abd soft +bs no mass guarding rebound, Neuro awake alert moves all extr  Simon Garay MD, Facep

## 2023-01-21 NOTE — H&P ADULT - PROBLEM SELECTOR PLAN 2
Lactate 2.6 on vbg but 1.7 on serum blood work  -initially thought to be due to either metformin vs infection  -c/w ivf for now  -repeat serum lactate in AM

## 2023-01-21 NOTE — ED CDU PROVIDER DISPOSITION NOTE - CLINICAL COURSE
67 year old female hx of HTN, DM, HLD presenting for burning dysuria, hematuria, and increased urinary frequency onset this morning. Pt denies fever, chills, abdominal pain, flank pain, n/v/d, recent sexual activity, vaginal discharge. Pt reports she has had UTIs in the past which present similarly except she has never had hematuria before  In CDU, 67 year old female hx of HTN, DM, HLD presenting for burning dysuria, hematuria, and increased urinary frequency onset this morning. Pt denies fever, chills, abdominal pain, flank pain, n/v/d, recent sexual activity, vaginal discharge. Pt reports she has had UTIs in the past which present similarly except she has never had hematuria before  In CDU, patient remained afebrile and pain well controlled. advised pt to f/u with pmd regarding elevated lactate likely 2/2 metformin use. cleared for discharge home per ED attending Dr. Garay 67 year old female hx of HTN, DM, HLD presenting for burning dysuria, hematuria, and increased urinary frequency onset this morning. Pt denies fever, chills, abdominal pain, flank pain, n/v/d, recent sexual activity, vaginal discharge. Pt reports she has had UTIs in the past which present similarly except she has never had hematuria before  In CDU, patient remained afebrile and pain well controlled however lactate is not clearing. will admit per ED attending Dr. Garay

## 2023-01-21 NOTE — ED CDU PROVIDER SUBSEQUENT DAY NOTE - PROGRESS NOTE DETAILS
MARC Dias: lactate remains elevated despite IVF and endo feels not related to metformin use. will admit. paged Billy To spite clinical improvement lactate has drifted higher. Will admit for further sheldon  Simon Garay MD, Facep

## 2023-01-22 ENCOUNTER — TRANSCRIPTION ENCOUNTER (OUTPATIENT)
Age: 68
End: 2023-01-22

## 2023-01-22 VITALS
TEMPERATURE: 98 F | HEART RATE: 77 BPM | RESPIRATION RATE: 18 BRPM | DIASTOLIC BLOOD PRESSURE: 77 MMHG | OXYGEN SATURATION: 98 % | SYSTOLIC BLOOD PRESSURE: 146 MMHG

## 2023-01-22 LAB
ALBUMIN SERPL ELPH-MCNC: 3.5 G/DL — SIGNIFICANT CHANGE UP (ref 3.3–5)
ALP SERPL-CCNC: 61 U/L — SIGNIFICANT CHANGE UP (ref 40–120)
ALT FLD-CCNC: 31 U/L — SIGNIFICANT CHANGE UP (ref 10–45)
ANION GAP SERPL CALC-SCNC: 14 MMOL/L — SIGNIFICANT CHANGE UP (ref 5–17)
AST SERPL-CCNC: 27 U/L — SIGNIFICANT CHANGE UP (ref 10–40)
BILIRUB SERPL-MCNC: 0.2 MG/DL — SIGNIFICANT CHANGE UP (ref 0.2–1.2)
BUN SERPL-MCNC: 11 MG/DL — SIGNIFICANT CHANGE UP (ref 7–23)
CALCIUM SERPL-MCNC: 9 MG/DL — SIGNIFICANT CHANGE UP (ref 8.4–10.5)
CHLORIDE SERPL-SCNC: 107 MMOL/L — SIGNIFICANT CHANGE UP (ref 96–108)
CO2 SERPL-SCNC: 18 MMOL/L — LOW (ref 22–31)
CREAT SERPL-MCNC: 0.55 MG/DL — SIGNIFICANT CHANGE UP (ref 0.5–1.3)
EGFR: 102 ML/MIN/1.73M2 — SIGNIFICANT CHANGE UP
FERRITIN SERPL-MCNC: 119 NG/ML — SIGNIFICANT CHANGE UP (ref 15–150)
GLUCOSE BLDC GLUCOMTR-MCNC: 151 MG/DL — HIGH (ref 70–99)
GLUCOSE BLDC GLUCOMTR-MCNC: 169 MG/DL — HIGH (ref 70–99)
GLUCOSE BLDC GLUCOMTR-MCNC: 194 MG/DL — HIGH (ref 70–99)
GLUCOSE SERPL-MCNC: 158 MG/DL — HIGH (ref 70–99)
HCT VFR BLD CALC: 30 % — LOW (ref 34.5–45)
HGB BLD-MCNC: 9.5 G/DL — LOW (ref 11.5–15.5)
IRON SATN MFR SERPL: 17 % — SIGNIFICANT CHANGE UP (ref 14–50)
IRON SATN MFR SERPL: 53 UG/DL — SIGNIFICANT CHANGE UP (ref 30–160)
LACTATE SERPL-SCNC: 1.2 MMOL/L — SIGNIFICANT CHANGE UP (ref 0.5–2)
MAGNESIUM SERPL-MCNC: 1.8 MG/DL — SIGNIFICANT CHANGE UP (ref 1.6–2.6)
MCHC RBC-ENTMCNC: 25.9 PG — LOW (ref 27–34)
MCHC RBC-ENTMCNC: 31.7 GM/DL — LOW (ref 32–36)
MCV RBC AUTO: 81.7 FL — SIGNIFICANT CHANGE UP (ref 80–100)
NRBC # BLD: 0 /100 WBCS — SIGNIFICANT CHANGE UP (ref 0–0)
PHOSPHATE SERPL-MCNC: 3.5 MG/DL — SIGNIFICANT CHANGE UP (ref 2.5–4.5)
PLATELET # BLD AUTO: 241 K/UL — SIGNIFICANT CHANGE UP (ref 150–400)
POTASSIUM SERPL-MCNC: 4.8 MMOL/L — SIGNIFICANT CHANGE UP (ref 3.5–5.3)
POTASSIUM SERPL-SCNC: 4.8 MMOL/L — SIGNIFICANT CHANGE UP (ref 3.5–5.3)
PROT SERPL-MCNC: 7 G/DL — SIGNIFICANT CHANGE UP (ref 6–8.3)
RBC # BLD: 3.67 M/UL — LOW (ref 3.8–5.2)
RBC # FLD: 13.9 % — SIGNIFICANT CHANGE UP (ref 10.3–14.5)
SODIUM SERPL-SCNC: 139 MMOL/L — SIGNIFICANT CHANGE UP (ref 135–145)
TIBC SERPL-MCNC: 311 UG/DL — SIGNIFICANT CHANGE UP (ref 220–430)
UIBC SERPL-MCNC: 258 UG/DL — SIGNIFICANT CHANGE UP (ref 110–370)
WBC # BLD: 8.1 K/UL — SIGNIFICANT CHANGE UP (ref 3.8–10.5)
WBC # FLD AUTO: 8.1 K/UL — SIGNIFICANT CHANGE UP (ref 3.8–10.5)

## 2023-01-22 PROCEDURE — 85027 COMPLETE CBC AUTOMATED: CPT

## 2023-01-22 PROCEDURE — 82435 ASSAY OF BLOOD CHLORIDE: CPT

## 2023-01-22 PROCEDURE — 96376 TX/PRO/DX INJ SAME DRUG ADON: CPT

## 2023-01-22 PROCEDURE — 84100 ASSAY OF PHOSPHORUS: CPT

## 2023-01-22 PROCEDURE — 97161 PT EVAL LOW COMPLEX 20 MIN: CPT

## 2023-01-22 PROCEDURE — 83540 ASSAY OF IRON: CPT

## 2023-01-22 PROCEDURE — 96366 THER/PROPH/DIAG IV INF ADDON: CPT

## 2023-01-22 PROCEDURE — 84132 ASSAY OF SERUM POTASSIUM: CPT

## 2023-01-22 PROCEDURE — 87040 BLOOD CULTURE FOR BACTERIA: CPT

## 2023-01-22 PROCEDURE — 84295 ASSAY OF SERUM SODIUM: CPT

## 2023-01-22 PROCEDURE — 83735 ASSAY OF MAGNESIUM: CPT

## 2023-01-22 PROCEDURE — 87641 MR-STAPH DNA AMP PROBE: CPT

## 2023-01-22 PROCEDURE — 87186 SC STD MICRODIL/AGAR DIL: CPT

## 2023-01-22 PROCEDURE — 82330 ASSAY OF CALCIUM: CPT

## 2023-01-22 PROCEDURE — 85014 HEMATOCRIT: CPT

## 2023-01-22 PROCEDURE — 81001 URINALYSIS AUTO W/SCOPE: CPT

## 2023-01-22 PROCEDURE — 82803 BLOOD GASES ANY COMBINATION: CPT

## 2023-01-22 PROCEDURE — 87640 STAPH A DNA AMP PROBE: CPT

## 2023-01-22 PROCEDURE — 86901 BLOOD TYPING SEROLOGIC RH(D): CPT

## 2023-01-22 PROCEDURE — 83605 ASSAY OF LACTIC ACID: CPT

## 2023-01-22 PROCEDURE — 86850 RBC ANTIBODY SCREEN: CPT

## 2023-01-22 PROCEDURE — 83550 IRON BINDING TEST: CPT

## 2023-01-22 PROCEDURE — G0378: CPT

## 2023-01-22 PROCEDURE — 86900 BLOOD TYPING SEROLOGIC ABO: CPT

## 2023-01-22 PROCEDURE — 85025 COMPLETE CBC W/AUTO DIFF WBC: CPT

## 2023-01-22 PROCEDURE — 80053 COMPREHEN METABOLIC PANEL: CPT

## 2023-01-22 PROCEDURE — 82728 ASSAY OF FERRITIN: CPT

## 2023-01-22 PROCEDURE — 82565 ASSAY OF CREATININE: CPT

## 2023-01-22 PROCEDURE — 87086 URINE CULTURE/COLONY COUNT: CPT

## 2023-01-22 PROCEDURE — 99285 EMERGENCY DEPT VISIT HI MDM: CPT | Mod: 25

## 2023-01-22 PROCEDURE — 82962 GLUCOSE BLOOD TEST: CPT

## 2023-01-22 PROCEDURE — 36415 COLL VENOUS BLD VENIPUNCTURE: CPT

## 2023-01-22 PROCEDURE — 82947 ASSAY GLUCOSE BLOOD QUANT: CPT

## 2023-01-22 PROCEDURE — 83036 HEMOGLOBIN GLYCOSYLATED A1C: CPT

## 2023-01-22 PROCEDURE — 85018 HEMOGLOBIN: CPT

## 2023-01-22 PROCEDURE — 87637 SARSCOV2&INF A&B&RSV AMP PRB: CPT

## 2023-01-22 PROCEDURE — 96365 THER/PROPH/DIAG IV INF INIT: CPT

## 2023-01-22 RX ORDER — FLUCONAZOLE 150 MG/1
150 TABLET ORAL ONCE
Refills: 0 | Status: DISCONTINUED | OUTPATIENT
Start: 2023-01-22 | End: 2023-01-22

## 2023-01-22 RX ORDER — MUPIROCIN 20 MG/G
1 OINTMENT TOPICAL
Refills: 0 | Status: DISCONTINUED | OUTPATIENT
Start: 2023-01-22 | End: 2023-01-22

## 2023-01-22 RX ORDER — FLUCONAZOLE 150 MG/1
150 TABLET ORAL ONCE
Refills: 0 | Status: COMPLETED | OUTPATIENT
Start: 2023-01-22 | End: 2023-01-22

## 2023-01-22 RX ORDER — MUPIROCIN 20 MG/G
1 OINTMENT TOPICAL
Qty: 1 | Refills: 0
Start: 2023-01-22 | End: 2023-01-26

## 2023-01-22 RX ADMIN — AMLODIPINE BESYLATE 2.5 MILLIGRAM(S): 2.5 TABLET ORAL at 05:37

## 2023-01-22 RX ADMIN — GABAPENTIN 300 MILLIGRAM(S): 400 CAPSULE ORAL at 17:30

## 2023-01-22 RX ADMIN — Medication 25 MILLIGRAM(S): at 05:37

## 2023-01-22 RX ADMIN — Medication 81 MILLIGRAM(S): at 11:28

## 2023-01-22 RX ADMIN — CEFTRIAXONE 100 MILLIGRAM(S): 500 INJECTION, POWDER, FOR SOLUTION INTRAMUSCULAR; INTRAVENOUS at 11:27

## 2023-01-22 RX ADMIN — GABAPENTIN 300 MILLIGRAM(S): 400 CAPSULE ORAL at 05:36

## 2023-01-22 RX ADMIN — Medication 100 MILLIGRAM(S): at 06:57

## 2023-01-22 RX ADMIN — MUPIROCIN 1 APPLICATION(S): 20 OINTMENT TOPICAL at 17:33

## 2023-01-22 RX ADMIN — LISINOPRIL 5 MILLIGRAM(S): 2.5 TABLET ORAL at 05:37

## 2023-01-22 RX ADMIN — MUPIROCIN 1 APPLICATION(S): 20 OINTMENT TOPICAL at 06:57

## 2023-01-22 RX ADMIN — Medication 2: at 12:39

## 2023-01-22 RX ADMIN — Medication 2: at 17:31

## 2023-01-22 RX ADMIN — FLUCONAZOLE 150 MILLIGRAM(S): 150 TABLET ORAL at 11:27

## 2023-01-22 RX ADMIN — Medication 25 MILLIGRAM(S): at 17:35

## 2023-01-22 RX ADMIN — Medication 2: at 08:32

## 2023-01-22 RX ADMIN — PANTOPRAZOLE SODIUM 40 MILLIGRAM(S): 20 TABLET, DELAYED RELEASE ORAL at 08:35

## 2023-01-22 NOTE — PHYSICAL THERAPY INITIAL EVALUATION ADULT - ADDITIONAL COMMENTS
As per pt, pt lives in an apartment w/ spouse and no steps to enter w/ UHR. PTA pt was independent w/ all mobility w/ straight cane and ADLs. 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 PTA pt was independent w/ all mobility w/ straight cane. No other DME. Pt requires some assist with ADLs from HHA. Pt has HHA 6hrs a day 7 days a week.

## 2023-01-22 NOTE — DISCHARGE NOTE NURSING/CASE MANAGEMENT/SOCIAL WORK - NSDCFUADDAPPT_GEN_ALL_CORE_FT
APPTS ARE READY TO BE MADE: [ x] YES    Best Family or Patient Contact (if needed):    Additional Information about above appointments (if needed):    1: pcp  2:   3:     Other comments or requests:

## 2023-01-22 NOTE — PHYSICAL THERAPY INITIAL EVALUATION ADULT - SOCIAL CONCERNS
None
pt c/o vomiting since saturday with dizziness and weakness. pt dneies pain diarrhea and fever Daughter Kerline

## 2023-01-22 NOTE — DISCHARGE NOTE PROVIDER - NSDCCPCAREPLAN_GEN_ALL_CORE_FT
PRINCIPAL DISCHARGE DIAGNOSIS  Diagnosis: Acute hemorrhagic cystitis  Assessment and Plan of Treatment: you were treated with ceftriaxone follow up with pcp in a few days bring these papers with you  return if worsens      SECONDARY DISCHARGE DIAGNOSES  Diagnosis: Diabetes mellitus with hyperglycemia  Assessment and Plan of Treatment: HA1C 9, mediction and dietary compliance encouraged. follow up with pcp    Diagnosis: Lactic acidosis  Assessment and Plan of Treatment: resolved

## 2023-01-22 NOTE — DISCHARGE NOTE PROVIDER - HOSPITAL COURSE
67 year old female hx of HTN, T2DM, HLD presenting for burning dysuria, hematuria, and increased urinary frequency and vaginal yeast ifection.   Admitted with Dx: UTI - CTX                           Lactic Acidosis: s/p IVF  >resolved                           Vaginitis- Treated with Diflucan 150 mg po x 1- completed course.   Follow up with pcp and GYN, cleared for dc silase by Dr Cervantes   67 year old female hx of HTN, T2DM, HLD presenting for burning dysuria, hematuria, and increased urinary frequency and vaginal yeast ifection.   Admitted with Dx: UTI - CTX                           Lactic Acidosis: s/p IVF  >resolved                           Vaginitis- Treated with Diflucan 150 mg po x 1- completed course.                             MRSA Nares Bactroban x 5 days  Follow up with pcp and GYN, cleared for marc escobedo by Dr Cervantes

## 2023-01-22 NOTE — DISCHARGE NOTE NURSING/CASE MANAGEMENT/SOCIAL WORK - PATIENT PORTAL LINK FT
You can access the FollowMyHealth Patient Portal offered by [facility] by registering at the following website: [email or web address]. By joining [employer]’s FollowMyHealth portal, you will also be able to view your health information using other applications (apps) compatible with our system.

## 2023-01-22 NOTE — PHYSICAL THERAPY INITIAL EVALUATION ADULT - PERTINENT HX OF CURRENT PROBLEM, REHAB EVAL
67y F PMH HTN, DM, HLD who presented to the ED initially for dysuria. Patient states that 1/20 in the AM, she had burning with urination, lower abdominal heaviness and pain. In the ED, UA was positive for leuk esterase, lactate was not improving despite IVF, and was admitted for further workup.

## 2023-01-22 NOTE — DISCHARGE NOTE PROVIDER - NSDCMRMEDTOKEN_GEN_ALL_CORE_FT
aspirin 81 mg oral tablet: 1 tab(s) orally once a day  cyclobenzaprine 5 mg oral tablet: 1 tab(s) orally 2 times a day  gabapentin 300 mg oral capsule: 1 cap(s) orally 2 times a day  glimepiride 2 mg oral tablet: 1 tab(s) orally 2 times a day  Levemir 100 units/mL subcutaneous solution: 50 unit(s) subcutaneous once a day (at bedtime)  lisinopril 5 mg oral tablet: 1 tab(s) orally once a day  metFORMIN 500 mg oral tablet: 2 tab(s) orally 2 times a day  metoprolol tartrate 25 mg oral tablet: 1 tab(s) orally 2 times a day  NexIUM 20 mg oral delayed release capsule: 1 cap(s) orally once a day, As Needed    NOTE: Pharmacy directions - 1 cap 2 times daily.  Norvasc 2.5 mg oral tablet: 1 tab(s) orally once a day  Restasis 0.05% ophthalmic emulsion: 1 drop(s) to each affected eye every 12 hours, As Needed  simvastatin 10 mg oral tablet: 1 tab(s) orally once a day (at bedtime)   aspirin 81 mg oral tablet: 1 tab(s) orally once a day  cyclobenzaprine 5 mg oral tablet: 1 tab(s) orally 2 times a day  gabapentin 300 mg oral capsule: 1 cap(s) orally 2 times a day  glimepiride 2 mg oral tablet: 1 tab(s) orally 2 times a day  Levemir 100 units/mL subcutaneous solution: 50 unit(s) subcutaneous once a day (at bedtime)  lisinopril 5 mg oral tablet: 1 tab(s) orally once a day  metFORMIN 500 mg oral tablet: 2 tab(s) orally 2 times a day  metoprolol tartrate 25 mg oral tablet: 1 tab(s) orally 2 times a day  mupirocin 2% topical ointment: Apply topically to affected area 2 times a day   NexIUM 20 mg oral delayed release capsule: 1 cap(s) orally once a day, As Needed    NOTE: Pharmacy directions - 1 cap 2 times daily.  Norvasc 2.5 mg oral tablet: 1 tab(s) orally once a day  Restasis 0.05% ophthalmic emulsion: 1 drop(s) to each affected eye every 12 hours, As Needed  simvastatin 10 mg oral tablet: 1 tab(s) orally once a day (at bedtime)
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
## 2023-01-22 NOTE — DISCHARGE NOTE PROVIDER - CARE PROVIDER_API CALL
GO LIPSCOM[facility]  Internal Medicine  [id number] [place] [address]  Phone: [phone]  Fax: [phone]  Established Patient  Follow Up Time: 1-3 days

## 2023-01-22 NOTE — DISCHARGE NOTE PROVIDER - NSDCFUADDAPPT_GEN_ALL_CORE_FT
APPTS ARE READY TO BE MADE: [ x] YES    Best Family or Patient Contact (if needed):    Additional Information about above appointments (if needed):    1: pcp  2:   3:     Other comments or requests:    APPTS ARE READY TO BE MADE: [ x] YES    Best Family or Patient Contact (if needed):    Additional Information about above appointments (if needed):    1: pcp  2:   3:     Other comments or requests:   Patient was provided with follow up request details for Natasha Reddy   and was advised to call to schedule follow up within specified time frame.  APPTS ARE READY TO BE MADE: [ x] YES    Best Family or Patient Contact (if needed):    Additional Information about above appointments (if needed):    1: pcp  2:   3:     Other comments or requests:   Patient will go for a walk in appointment on 1/25/2023 with Non [facility] provider Natasha Reddy since the office accepts those.

## 2023-01-25 LAB
CULTURE RESULTS: SIGNIFICANT CHANGE UP
CULTURE RESULTS: SIGNIFICANT CHANGE UP
SPECIMEN SOURCE: SIGNIFICANT CHANGE UP
SPECIMEN SOURCE: SIGNIFICANT CHANGE UP

## 2023-05-10 ENCOUNTER — INPATIENT (INPATIENT)
Facility: HOSPITAL | Age: 68
LOS: 2 days | Discharge: HOME | DRG: 313 | End: 2023-05-13
Attending: INTERNAL MEDICINE | Admitting: INTERNAL MEDICINE
Payer: COMMERCIAL

## 2023-05-10 VITALS
SYSTOLIC BLOOD PRESSURE: 142 MMHG | RESPIRATION RATE: 19 BRPM | WEIGHT: 175.05 LBS | DIASTOLIC BLOOD PRESSURE: 87 MMHG | HEIGHT: 63 IN | OXYGEN SATURATION: 98 % | HEART RATE: 82 BPM | TEMPERATURE: 98 F

## 2023-05-10 DIAGNOSIS — R07.9 CHEST PAIN, UNSPECIFIED: ICD-10-CM

## 2023-05-10 LAB
ALBUMIN SERPL ELPH-MCNC: 4.4 G/DL — SIGNIFICANT CHANGE UP (ref 3.3–5)
ALP SERPL-CCNC: 90 U/L — SIGNIFICANT CHANGE UP (ref 40–120)
ALT FLD-CCNC: 96 U/L — HIGH (ref 10–45)
ANION GAP SERPL CALC-SCNC: 15 MMOL/L — SIGNIFICANT CHANGE UP (ref 5–17)
APTT BLD: 30.4 SEC — SIGNIFICANT CHANGE UP (ref 27.5–35.5)
AST SERPL-CCNC: 72 U/L — HIGH (ref 10–40)
BASOPHILS # BLD AUTO: 0.04 K/UL — SIGNIFICANT CHANGE UP (ref 0–0.2)
BASOPHILS NFR BLD AUTO: 0.5 % — SIGNIFICANT CHANGE UP (ref 0–2)
BILIRUB SERPL-MCNC: 0.3 MG/DL — SIGNIFICANT CHANGE UP (ref 0.2–1.2)
BUN SERPL-MCNC: 12 MG/DL — SIGNIFICANT CHANGE UP (ref 7–23)
CALCIUM SERPL-MCNC: 9.5 MG/DL — SIGNIFICANT CHANGE UP (ref 8.4–10.5)
CHLORIDE SERPL-SCNC: 96 MMOL/L — SIGNIFICANT CHANGE UP (ref 96–108)
CO2 SERPL-SCNC: 22 MMOL/L — SIGNIFICANT CHANGE UP (ref 22–31)
CREAT SERPL-MCNC: 0.61 MG/DL — SIGNIFICANT CHANGE UP (ref 0.5–1.3)
EGFR: 99 ML/MIN/1.73M2 — SIGNIFICANT CHANGE UP
EOSINOPHIL # BLD AUTO: 0.33 K/UL — SIGNIFICANT CHANGE UP (ref 0–0.5)
EOSINOPHIL NFR BLD AUTO: 3.8 % — SIGNIFICANT CHANGE UP (ref 0–6)
GLUCOSE SERPL-MCNC: 259 MG/DL — HIGH (ref 70–99)
HCT VFR BLD CALC: 37.8 % — SIGNIFICANT CHANGE UP (ref 34.5–45)
HGB BLD-MCNC: 12 G/DL — SIGNIFICANT CHANGE UP (ref 11.5–15.5)
IMM GRANULOCYTES NFR BLD AUTO: 0.3 % — SIGNIFICANT CHANGE UP (ref 0–0.9)
INR BLD: 1.04 RATIO — SIGNIFICANT CHANGE UP (ref 0.88–1.16)
LYMPHOCYTES # BLD AUTO: 3.1 K/UL — SIGNIFICANT CHANGE UP (ref 1–3.3)
LYMPHOCYTES # BLD AUTO: 36.1 % — SIGNIFICANT CHANGE UP (ref 13–44)
MCHC RBC-ENTMCNC: 25.4 PG — LOW (ref 27–34)
MCHC RBC-ENTMCNC: 31.7 GM/DL — LOW (ref 32–36)
MCV RBC AUTO: 79.9 FL — LOW (ref 80–100)
MONOCYTES # BLD AUTO: 0.8 K/UL — SIGNIFICANT CHANGE UP (ref 0–0.9)
MONOCYTES NFR BLD AUTO: 9.3 % — SIGNIFICANT CHANGE UP (ref 2–14)
NEUTROPHILS # BLD AUTO: 4.29 K/UL — SIGNIFICANT CHANGE UP (ref 1.8–7.4)
NEUTROPHILS NFR BLD AUTO: 50 % — SIGNIFICANT CHANGE UP (ref 43–77)
NRBC # BLD: 0 /100 WBCS — SIGNIFICANT CHANGE UP (ref 0–0)
NT-PROBNP SERPL-SCNC: 36 PG/ML — SIGNIFICANT CHANGE UP (ref 0–300)
PLATELET # BLD AUTO: 331 K/UL — SIGNIFICANT CHANGE UP (ref 150–400)
POTASSIUM SERPL-MCNC: 4.7 MMOL/L — SIGNIFICANT CHANGE UP (ref 3.5–5.3)
POTASSIUM SERPL-SCNC: 4.7 MMOL/L — SIGNIFICANT CHANGE UP (ref 3.5–5.3)
PROT SERPL-MCNC: 8.2 G/DL — SIGNIFICANT CHANGE UP (ref 6–8.3)
PROTHROM AB SERPL-ACNC: 12 SEC — SIGNIFICANT CHANGE UP (ref 10.5–13.4)
RBC # BLD: 4.73 M/UL — SIGNIFICANT CHANGE UP (ref 3.8–5.2)
RBC # FLD: 13.3 % — SIGNIFICANT CHANGE UP (ref 10.3–14.5)
SODIUM SERPL-SCNC: 133 MMOL/L — LOW (ref 135–145)
TROPONIN T, HIGH SENSITIVITY RESULT: 12 NG/L — SIGNIFICANT CHANGE UP (ref 0–51)
TROPONIN T, HIGH SENSITIVITY RESULT: 13 NG/L — SIGNIFICANT CHANGE UP (ref 0–51)
WBC # BLD: 8.59 K/UL — SIGNIFICANT CHANGE UP (ref 3.8–10.5)
WBC # FLD AUTO: 8.59 K/UL — SIGNIFICANT CHANGE UP (ref 3.8–10.5)

## 2023-05-10 PROCEDURE — 99285 EMERGENCY DEPT VISIT HI MDM: CPT

## 2023-05-10 PROCEDURE — 71045 X-RAY EXAM CHEST 1 VIEW: CPT | Mod: 26

## 2023-05-10 RX ORDER — ASPIRIN/CALCIUM CARB/MAGNESIUM 324 MG
81 TABLET ORAL DAILY
Refills: 0 | Status: DISCONTINUED | OUTPATIENT
Start: 2023-05-10 | End: 2023-05-13

## 2023-05-10 RX ORDER — GABAPENTIN 400 MG/1
300 CAPSULE ORAL ONCE
Refills: 0 | Status: COMPLETED | OUTPATIENT
Start: 2023-05-10 | End: 2023-05-11

## 2023-05-10 RX ORDER — HEPARIN SODIUM 5000 [USP'U]/ML
5000 INJECTION INTRAVENOUS; SUBCUTANEOUS EVERY 12 HOURS
Refills: 0 | Status: DISCONTINUED | OUTPATIENT
Start: 2023-05-10 | End: 2023-05-13

## 2023-05-10 RX ORDER — INSULIN GLARGINE 100 [IU]/ML
40 INJECTION, SOLUTION SUBCUTANEOUS AT BEDTIME
Refills: 0 | Status: DISCONTINUED | OUTPATIENT
Start: 2023-05-10 | End: 2023-05-10

## 2023-05-10 RX ORDER — AMLODIPINE BESYLATE 2.5 MG/1
2.5 TABLET ORAL DAILY
Refills: 0 | Status: DISCONTINUED | OUTPATIENT
Start: 2023-05-10 | End: 2023-05-13

## 2023-05-10 RX ORDER — METOPROLOL TARTRATE 50 MG
25 TABLET ORAL
Refills: 0 | Status: DISCONTINUED | OUTPATIENT
Start: 2023-05-10 | End: 2023-05-13

## 2023-05-10 RX ORDER — INSULIN GLARGINE 100 [IU]/ML
30 INJECTION, SOLUTION SUBCUTANEOUS AT BEDTIME
Refills: 0 | Status: DISCONTINUED | OUTPATIENT
Start: 2023-05-10 | End: 2023-05-12

## 2023-05-10 RX ORDER — SODIUM CHLORIDE 9 MG/ML
1000 INJECTION, SOLUTION INTRAVENOUS
Refills: 0 | Status: DISCONTINUED | OUTPATIENT
Start: 2023-05-10 | End: 2023-05-13

## 2023-05-10 RX ORDER — ASPIRIN/CALCIUM CARB/MAGNESIUM 324 MG
324 TABLET ORAL ONCE
Refills: 0 | Status: COMPLETED | OUTPATIENT
Start: 2023-05-10 | End: 2023-05-10

## 2023-05-10 RX ORDER — DEXTROSE 50 % IN WATER 50 %
25 SYRINGE (ML) INTRAVENOUS ONCE
Refills: 0 | Status: DISCONTINUED | OUTPATIENT
Start: 2023-05-10 | End: 2023-05-13

## 2023-05-10 RX ORDER — GLUCAGON INJECTION, SOLUTION 0.5 MG/.1ML
1 INJECTION, SOLUTION SUBCUTANEOUS ONCE
Refills: 0 | Status: DISCONTINUED | OUTPATIENT
Start: 2023-05-10 | End: 2023-05-13

## 2023-05-10 RX ORDER — SIMVASTATIN 20 MG/1
10 TABLET, FILM COATED ORAL AT BEDTIME
Refills: 0 | Status: DISCONTINUED | OUTPATIENT
Start: 2023-05-10 | End: 2023-05-13

## 2023-05-10 RX ORDER — DEXTROSE 50 % IN WATER 50 %
12.5 SYRINGE (ML) INTRAVENOUS ONCE
Refills: 0 | Status: DISCONTINUED | OUTPATIENT
Start: 2023-05-10 | End: 2023-05-13

## 2023-05-10 RX ORDER — LISINOPRIL 2.5 MG/1
5 TABLET ORAL DAILY
Refills: 0 | Status: DISCONTINUED | OUTPATIENT
Start: 2023-05-10 | End: 2023-05-13

## 2023-05-10 RX ORDER — INSULIN LISPRO 100/ML
VIAL (ML) SUBCUTANEOUS
Refills: 0 | Status: DISCONTINUED | OUTPATIENT
Start: 2023-05-10 | End: 2023-05-13

## 2023-05-10 RX ORDER — DEXTROSE 50 % IN WATER 50 %
15 SYRINGE (ML) INTRAVENOUS ONCE
Refills: 0 | Status: DISCONTINUED | OUTPATIENT
Start: 2023-05-10 | End: 2023-05-13

## 2023-05-10 RX ADMIN — Medication 324 MILLIGRAM(S): at 20:58

## 2023-05-10 NOTE — ED PROVIDER NOTE - PHYSICAL EXAMINATION
Gen: NAD, AAOx3, non-toxic appearing  HEENT: NCAT, normal conjunctiva, oral mucosa moist  Lung: speaking in full sentences, good aeration bilaterally, lungs CTA b/l  CV: regular rate and rhythm. cap refill <2x. peripheral pulses 2+bilaterally, no leg edema  Abd: soft, ND, NT  MSK: no visible deformities  Neuro: No focal deficits  Skin: Intact  Psych: normal affect

## 2023-05-10 NOTE — ED ADULT TRIAGE NOTE - TEMPERATURE IN CELSIUS (DEGREES C)
Fax received from [employer] stating they have cancelled patient's Cologuard order because it has exceeded 365 days from the initial order.  Will need to reorder test if needed.  
36.6

## 2023-05-10 NOTE — ED ADULT NURSE NOTE - OBJECTIVE STATEMENT
68y F A&Ox4 c/o cp. Pt states that 2 days ago while laying down she had a sudden on-set of sharp left sided chest pain. Pt also states that she has been having chills and belching every time the pain starts followed by SOB. Upon assessment pt sitting forward belching stating she feels discomfort in the upper left epigastrium. Denies any injury, falls, N/V/D/ Fever/ Gi/Gu symptoms. PMH of  DM, HLD and HTN. PSH of right ovary removal. VSS

## 2023-05-10 NOTE — ED PROVIDER NOTE - CLINICAL SUMMARY MEDICAL DECISION MAKING FREE TEXT BOX
68F no previous cardiac history, (hx of DM, HTN, HLD) p/w chest pain. Atypical in nature, L sided sharp pain, nonexertional, +sob/diaphoresis associated. EKG nonischemic. Reports recent stress testing, unknown results, no further interventions. Concern for cardiac vs MSK pain. plan for labs, cxr, cardiac monitoring and possible admission.

## 2023-05-10 NOTE — H&P ADULT - HIV OFFER
Did not see lipitor on current med list  Moira Castro RT (R)      
Previously Declined (within the last year)

## 2023-05-10 NOTE — ED PROVIDER NOTE - ATTENDING CONTRIBUTION TO CARE
I, Keegan Walls, performed a history and physical exam of the patient and discussed their management with the resident and/or advanced care provider. I reviewed the resident and/or advanced care provider's note and agree with the documented findings and plan of care except where noted. I was present and available for all procedures.    67 yo F pmhx htn, hld, dm, presents to ED for left sided CP x2 days, intermittent, non exertional but is associated with SOB and diaphoresis. Pt follows with Dr. Buckner of cardiology. Denies fever, cough, abd pain, n/v/d, leg pain, leg swelling.    vss. awake, alert. neurologically intact, no focal deficits. A&ox3. rrr nl s1/s2, no m/r/g. lungs cta. abd soft nt nd. b/l le no edema or ttp.     mdm: concern is for acs (heart score 5). EKG here not meeting ABDIEL/STEMI criteria. Lower suspicion for aortic dissection or Pulmonary embolus or pericarditis/myocarditiis. Will check CBC to eval WBC, anemia, plt count; CMP to eval for lyte abnormalities, renal and/or liver dysfunction. troponin to eval for acs. cxr. asa 324. cards consult (speak w/ dr. buckner). will reassess. anticipate admission vs cdu

## 2023-05-10 NOTE — ED PROVIDER NOTE - OBJECTIVE STATEMENT
68-year-old female history of diabetes, hyperlipidemia, hypertension presenting with left-sided chest pain for 2 days.  States that she has been having intermittent sharp left-sided pain, not worse with exertion.  She reports associated shortness of breath and diaphoresis with the episodes.  She has not taken anything for the pain.  She reports stress testing 2 to 3 months ago with Dr. Liriano there were no interventions recommended at that time.  She denies fevers, chills, cough.  No leg swelling.

## 2023-05-10 NOTE — H&P ADULT - ASSESSMENT
68F with PMH/PSH including DM, HLD, HTN presents to the ED with L chest pain.  Reports started two days ago at rest, reports sharp pain in area under L breast.  Reports sudden onset, reports lasts about 5 min, resolves spontaneously.  Reports associated with shortness of breath, weakness, reports sometimes has diaphoresis with symptoms.  Denies LE edema.  Denies positional, pleuritic.  Denies rash.  Reports had stress test with Dr. García in his office about 2-3 months ago.  Denies taking anything for pain over the last two days.  Denies recent travel, sick contacts.  Denies abdominal pain, nausea, vomiting, diarrhea, urinary complaints  pt with sig cardiac risk factor with recent stress test with abnormality in inferior wall with mild ischemia with chest pain , reproducible on palpation  tele  cardiac enzymes  pt had abnormal cardiac cath few years ago  will consider possible cardiac cath  asa daily  continue all cardiac meds
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
## 2023-05-10 NOTE — ED ADULT NURSE NOTE - NSFALLUNIVINTERV_ED_ALL_ED
Bed/Stretcher in lowest position, wheels locked, appropriate side rails in place/Call bell, personal items and telephone in reach/Instruct patient to call for assistance before getting out of bed/chair/stretcher/Non-slip footwear applied when patient is off stretcher/[place] to call system/Physically safe environment - no spills, clutter or unnecessary equipment/Purposeful proactive rounding/Room/bathroom lighting operational, light cord in reach

## 2023-05-10 NOTE — ED PROVIDER NOTE - PROGRESS NOTE DETAILS
Attending MD Alonso: Called Dr. Liriano to touch base about stress, will await call back. Yessica, PGY3: Spoke with Dr. Liriano and patient would be candidate for CDU for CT coronary. Had RCA findings on recent stress. However no cdu beds. Will admit.

## 2023-05-11 LAB
A1C WITH ESTIMATED AVERAGE GLUCOSE RESULT: 10.1 % — HIGH (ref 4–5.6)
ALBUMIN SERPL ELPH-MCNC: 4 G/DL — SIGNIFICANT CHANGE UP (ref 3.3–5)
ALP SERPL-CCNC: 86 U/L — SIGNIFICANT CHANGE UP (ref 40–120)
ALT FLD-CCNC: 92 U/L — HIGH (ref 10–45)
ANION GAP SERPL CALC-SCNC: 15 MMOL/L — SIGNIFICANT CHANGE UP (ref 5–17)
AST SERPL-CCNC: 60 U/L — HIGH (ref 10–40)
BILIRUB SERPL-MCNC: 0.3 MG/DL — SIGNIFICANT CHANGE UP (ref 0.2–1.2)
BUN SERPL-MCNC: 12 MG/DL — SIGNIFICANT CHANGE UP (ref 7–23)
CALCIUM SERPL-MCNC: 9.4 MG/DL — SIGNIFICANT CHANGE UP (ref 8.4–10.5)
CHLORIDE SERPL-SCNC: 98 MMOL/L — SIGNIFICANT CHANGE UP (ref 96–108)
CK MB BLD-MCNC: 1.6 % — SIGNIFICANT CHANGE UP (ref 0–3.5)
CK MB CFR SERPL CALC: 2.4 NG/ML — SIGNIFICANT CHANGE UP (ref 0–3.8)
CK SERPL-CCNC: 146 U/L — SIGNIFICANT CHANGE UP (ref 25–170)
CO2 SERPL-SCNC: 22 MMOL/L — SIGNIFICANT CHANGE UP (ref 22–31)
CREAT SERPL-MCNC: 0.59 MG/DL — SIGNIFICANT CHANGE UP (ref 0.5–1.3)
CRP SERPL-MCNC: <3 MG/L — SIGNIFICANT CHANGE UP (ref 0–4)
EGFR: 99 ML/MIN/1.73M2 — SIGNIFICANT CHANGE UP
ERYTHROCYTE [SEDIMENTATION RATE] IN BLOOD: 88 MM/HR — HIGH (ref 0–20)
ESTIMATED AVERAGE GLUCOSE: 243 MG/DL — HIGH (ref 68–114)
GAS PNL BLDV: SIGNIFICANT CHANGE UP
GLUCOSE BLDC GLUCOMTR-MCNC: 220 MG/DL — HIGH (ref 70–99)
GLUCOSE BLDC GLUCOMTR-MCNC: 222 MG/DL — HIGH (ref 70–99)
GLUCOSE BLDC GLUCOMTR-MCNC: 234 MG/DL — HIGH (ref 70–99)
GLUCOSE BLDC GLUCOMTR-MCNC: 243 MG/DL — HIGH (ref 70–99)
GLUCOSE SERPL-MCNC: 200 MG/DL — HIGH (ref 70–99)
HCT VFR BLD CALC: 35.8 % — SIGNIFICANT CHANGE UP (ref 34.5–45)
HGB BLD-MCNC: 11.3 G/DL — LOW (ref 11.5–15.5)
MAGNESIUM SERPL-MCNC: 1.7 MG/DL — SIGNIFICANT CHANGE UP (ref 1.6–2.6)
MCHC RBC-ENTMCNC: 25.3 PG — LOW (ref 27–34)
MCHC RBC-ENTMCNC: 31.6 GM/DL — LOW (ref 32–36)
MCV RBC AUTO: 80.3 FL — SIGNIFICANT CHANGE UP (ref 80–100)
NRBC # BLD: 0 /100 WBCS — SIGNIFICANT CHANGE UP (ref 0–0)
PHOSPHATE SERPL-MCNC: 3.6 MG/DL — SIGNIFICANT CHANGE UP (ref 2.5–4.5)
PLATELET # BLD AUTO: 362 K/UL — SIGNIFICANT CHANGE UP (ref 150–400)
POTASSIUM SERPL-MCNC: 3.9 MMOL/L — SIGNIFICANT CHANGE UP (ref 3.5–5.3)
POTASSIUM SERPL-SCNC: 3.9 MMOL/L — SIGNIFICANT CHANGE UP (ref 3.5–5.3)
PROT SERPL-MCNC: 7.8 G/DL — SIGNIFICANT CHANGE UP (ref 6–8.3)
RBC # BLD: 4.46 M/UL — SIGNIFICANT CHANGE UP (ref 3.8–5.2)
RBC # FLD: 13.5 % — SIGNIFICANT CHANGE UP (ref 10.3–14.5)
SODIUM SERPL-SCNC: 135 MMOL/L — SIGNIFICANT CHANGE UP (ref 135–145)
TROPONIN T, HIGH SENSITIVITY RESULT: 15 NG/L — SIGNIFICANT CHANGE UP (ref 0–51)
TSH SERPL-MCNC: 2.45 UIU/ML — SIGNIFICANT CHANGE UP (ref 0.27–4.2)
WBC # BLD: 8.6 K/UL — SIGNIFICANT CHANGE UP (ref 3.8–10.5)
WBC # FLD AUTO: 8.6 K/UL — SIGNIFICANT CHANGE UP (ref 3.8–10.5)

## 2023-05-11 PROCEDURE — 75574 CT ANGIO HRT W/3D IMAGE: CPT | Mod: 26

## 2023-05-11 RX ORDER — INSULIN LISPRO 100/ML
VIAL (ML) SUBCUTANEOUS AT BEDTIME
Refills: 0 | Status: DISCONTINUED | OUTPATIENT
Start: 2023-05-11 | End: 2023-05-13

## 2023-05-11 RX ADMIN — Medication 81 MILLIGRAM(S): at 11:29

## 2023-05-11 RX ADMIN — AMLODIPINE BESYLATE 2.5 MILLIGRAM(S): 2.5 TABLET ORAL at 05:08

## 2023-05-11 RX ADMIN — INSULIN GLARGINE 30 UNIT(S): 100 INJECTION, SOLUTION SUBCUTANEOUS at 21:19

## 2023-05-11 RX ADMIN — Medication 2: at 08:27

## 2023-05-11 RX ADMIN — Medication 2: at 11:28

## 2023-05-11 RX ADMIN — HEPARIN SODIUM 5000 UNIT(S): 5000 INJECTION INTRAVENOUS; SUBCUTANEOUS at 05:11

## 2023-05-11 RX ADMIN — HEPARIN SODIUM 5000 UNIT(S): 5000 INJECTION INTRAVENOUS; SUBCUTANEOUS at 17:25

## 2023-05-11 RX ADMIN — GABAPENTIN 300 MILLIGRAM(S): 400 CAPSULE ORAL at 05:07

## 2023-05-11 RX ADMIN — Medication 25 MILLIGRAM(S): at 05:08

## 2023-05-11 RX ADMIN — Medication 2: at 18:17

## 2023-05-11 RX ADMIN — SIMVASTATIN 10 MILLIGRAM(S): 20 TABLET, FILM COATED ORAL at 21:19

## 2023-05-11 NOTE — PATIENT PROFILE ADULT - FALL HARM RISK - HARM RISK INTERVENTIONS

## 2023-05-12 ENCOUNTER — TRANSCRIPTION ENCOUNTER (OUTPATIENT)
Age: 68
End: 2023-05-12

## 2023-05-12 DIAGNOSIS — I10 ESSENTIAL (PRIMARY) HYPERTENSION: ICD-10-CM

## 2023-05-12 DIAGNOSIS — E04.1 NONTOXIC SINGLE THYROID NODULE: ICD-10-CM

## 2023-05-12 DIAGNOSIS — E11.65 TYPE 2 DIABETES MELLITUS WITH HYPERGLYCEMIA: ICD-10-CM

## 2023-05-12 DIAGNOSIS — E78.5 HYPERLIPIDEMIA, UNSPECIFIED: ICD-10-CM

## 2023-05-12 LAB
GLUCOSE BLDC GLUCOMTR-MCNC: 191 MG/DL — HIGH (ref 70–99)
GLUCOSE BLDC GLUCOMTR-MCNC: 209 MG/DL — HIGH (ref 70–99)
GLUCOSE BLDC GLUCOMTR-MCNC: 210 MG/DL — HIGH (ref 70–99)
GLUCOSE BLDC GLUCOMTR-MCNC: 215 MG/DL — HIGH (ref 70–99)
GLUCOSE BLDC GLUCOMTR-MCNC: 231 MG/DL — HIGH (ref 70–99)

## 2023-05-12 PROCEDURE — 99223 1ST HOSP IP/OBS HIGH 75: CPT

## 2023-05-12 RX ORDER — SEMAGLUTIDE 0.68 MG/ML
0.25 INJECTION, SOLUTION SUBCUTANEOUS
Qty: 1 | Refills: 0
Start: 2023-05-12 | End: 2023-06-10

## 2023-05-12 RX ORDER — INSULIN LISPRO 100/ML
2 VIAL (ML) SUBCUTANEOUS
Refills: 0 | Status: COMPLETED | OUTPATIENT
Start: 2023-05-12 | End: 2023-05-12

## 2023-05-12 RX ORDER — MAGNESIUM SULFATE 500 MG/ML
2 VIAL (ML) INJECTION ONCE
Refills: 0 | Status: COMPLETED | OUTPATIENT
Start: 2023-05-12 | End: 2023-05-12

## 2023-05-12 RX ORDER — INSULIN GLARGINE 100 [IU]/ML
20 INJECTION, SOLUTION SUBCUTANEOUS AT BEDTIME
Refills: 0 | Status: DISCONTINUED | OUTPATIENT
Start: 2023-05-12 | End: 2023-05-13

## 2023-05-12 RX ORDER — INSULIN LISPRO 100/ML
6 VIAL (ML) SUBCUTANEOUS
Refills: 0 | Status: DISCONTINUED | OUTPATIENT
Start: 2023-05-13 | End: 2023-05-13

## 2023-05-12 RX ADMIN — HEPARIN SODIUM 5000 UNIT(S): 5000 INJECTION INTRAVENOUS; SUBCUTANEOUS at 17:42

## 2023-05-12 RX ADMIN — Medication 81 MILLIGRAM(S): at 05:42

## 2023-05-12 RX ADMIN — Medication 25 MILLIGRAM(S): at 05:43

## 2023-05-12 RX ADMIN — SIMVASTATIN 10 MILLIGRAM(S): 20 TABLET, FILM COATED ORAL at 20:09

## 2023-05-12 RX ADMIN — Medication 2: at 11:39

## 2023-05-12 RX ADMIN — Medication 25 MILLIGRAM(S): at 17:42

## 2023-05-12 RX ADMIN — Medication 2 UNIT(S): at 17:41

## 2023-05-12 RX ADMIN — AMLODIPINE BESYLATE 2.5 MILLIGRAM(S): 2.5 TABLET ORAL at 05:43

## 2023-05-12 RX ADMIN — Medication 2: at 17:40

## 2023-05-12 RX ADMIN — LISINOPRIL 5 MILLIGRAM(S): 2.5 TABLET ORAL at 05:42

## 2023-05-12 RX ADMIN — INSULIN GLARGINE 20 UNIT(S): 100 INJECTION, SOLUTION SUBCUTANEOUS at 20:09

## 2023-05-12 RX ADMIN — Medication 25 GRAM(S): at 08:53

## 2023-05-12 NOTE — CONSULT NOTE ADULT - ASSESSMENT
69 y/o F w/ uncontrolled Type 2 DM w/ hyperglycemia, HTN, HLD admitted with chest pain s/p cath (high risk patient with severely uncontrolled type 2 diabetes with A1c of 10.1% at high risk of CAD and CVA with high level decision-making).

## 2023-05-12 NOTE — DISCHARGE NOTE PROVIDER - NSDCMRMEDTOKEN_GEN_ALL_CORE_FT
aspirin 81 mg oral tablet: 1 tab(s) orally once a day  cyclobenzaprine 5 mg oral tablet: 1 tab(s) orally 2 times a day  gabapentin 300 mg oral capsule: 1 cap(s) orally 2 times a day  glimepiride 2 mg oral tablet: 1 tab(s) orally 2 times a day  Levemir 100 units/mL subcutaneous solution: 50 unit(s) subcutaneous once a day (at bedtime)  lisinopril 5 mg oral tablet: 1 tab(s) orally once a day  metFORMIN 500 mg oral tablet: 2 tab(s) orally 2 times a day  metoprolol tartrate 25 mg oral tablet: 1 tab(s) orally 2 times a day  mupirocin 2% topical ointment: Apply topically to affected area 2 times a day   NexIUM 20 mg oral delayed release capsule: 1 cap(s) orally once a day, As Needed    NOTE: Pharmacy directions - 1 cap 2 times daily.  Norvasc 2.5 mg oral tablet: 1 tab(s) orally once a day  Ozempic 2 mg/1.5 mL (0.25 mg or 0.5 mg dose) subcutaneous solution: 0.25 milligram(s) subcutaneously once a week  Restasis 0.05% ophthalmic emulsion: 1 drop(s) to each affected eye every 12 hours, As Needed  simvastatin 10 mg oral tablet: 1 tab(s) orally once a day (at bedtime)   aspirin 81 mg oral tablet: 1 tab(s) orally once a day  cyclobenzaprine 5 mg oral tablet: 1 tab(s) orally 2 times a day  gabapentin 300 mg oral capsule: 1 cap(s) orally 2 times a day  Levemir FlexPen 100 units/mL subcutaneous solution: 25 subcutaneous once a day (at bedtime)  lisinopril 5 mg oral tablet: 1 tab(s) orally once a day  metFORMIN 500 mg oral tablet: 2 tab(s) orally 2 times a day  metoprolol tartrate 25 mg oral tablet: 1 tab(s) orally 2 times a day  mupirocin 2% topical ointment: Apply topically to affected area 2 times a day   NexIUM 20 mg oral delayed release capsule: 1 cap(s) orally once a day, As Needed    NOTE: Pharmacy directions - 1 cap 2 times daily.  Norvasc 2.5 mg oral tablet: 1 tab(s) orally once a day  Ozempic 2 mg/1.5 mL (0.25 mg or 0.5 mg dose) subcutaneous solution: 0.25 milligram(s) subcutaneously once a week  Restasis 0.05% ophthalmic emulsion: 1 drop(s) to each affected eye every 12 hours, As Needed  simvastatin 10 mg oral tablet: 1 tab(s) orally once a day (at bedtime)

## 2023-05-12 NOTE — DISCHARGE NOTE PROVIDER - CARE PROVIDER_API CALL
Kristy Liriano (DO)  Cardiovascular Disease  [address] [facility], [facility] [[email or web address] [address]  [place] [address]  Phone: [phone]  Fax: [phone]  Follow Up Time: 2 weeks

## 2023-05-12 NOTE — DISCHARGE NOTE PROVIDER - NSDCCPTREATMENT_GEN_ALL_CORE_FT
PRINCIPAL PROCEDURE  Procedure: CT angiography of coronary arteries  Findings and Treatment: 5/11/23  IMPRESSION:  1.  Mild luminal narrowing of the proximal and mid left anterior   descending coronary artery.  2.  Mild luminal narrowing of the second diagonal division  3.  Mild luminal narrowing of the proximal circumflex coronary artery and   first obtuse marginal division.  4.  Mild luminal narrowing of the proximal and mid right coronary artery.

## 2023-05-12 NOTE — DISCHARGE NOTE PROVIDER - NSDCFUADDAPPT_GEN_ALL_CORE_FT
AS per ENDOCRINOLOGY:     levemir 25 units daily  ****metformin 500mg BID, patient can increase by 500mg per day for 1 week as tolerated to maximum dose of 1000mg BID  glimeperide 4mg BID  ozempic 0.25mg weekly  follow up at Endocrine Practice at [address] [place] [address], [address], [place] [address]; Ph # [phone]

## 2023-05-12 NOTE — DISCHARGE NOTE PROVIDER - HOSPITAL COURSE
HPI: 68F with PMH/PSH including DM, HLD, HTN presents to the ED with L chest pain.  Reports started two days ago at rest, reports sharp pain in area under L breast.  Reports sudden onset, reports lasts about 5 min, resolves spontaneously.  Reports associated with shortness of breath, weakness, reports sometimes has diaphoresis with symptoms.  Denies LE edema.  Denies positional, pleuritic.  Denies rash.  Reports had stress test with Dr. García in his office about 2-3 months ago.  Denies taking anything for pain over the last two days.  Denies recent travel, sick contacts.  Denies abdominal pain, nausea, vomiting, diarrhea, urinary complaints     HPI: 68F with PMH/PSH including DM, HLD, HTN presents to the ED with L chest pain.  Reports started two days ago at rest, reports sharp pain in area under L breast.  Reports sudden onset, reports lasts about 5 min, resolves spontaneously.  Reports associated with shortness of breath, weakness, reports sometimes has diaphoresis with symptoms.  Denies LE edema.  Denies positional, pleuritic.  Denies rash.  Reports had stress test with Dr. García in his office about 2-3 months ago.  Denies taking anything for pain over the last two days.  Denies recent travel, sick contacts.  Denies abdominal pain, nausea, vomiting,    5/13 stable overnight with no events, CTA with mild findings  blood glucoce improved, endo made dc recs ( see [place])   pt cleared for discharge by Dr Liriano ( as per phone conversation) and endocrine with outpt f/u       < from: CT Angio Heart and Coronaries w/ IV Cont (05.11.23 @ 16:50) >    IMPRESSION:    1.  Mild luminal narrowing of the proximal and mid left anterior   descending coronary artery.  2.  Mild luminal narrowing of the second diagonal division  3.  Mild luminal narrowing of the proximal circumflex coronary artery and   first obtuse marginal division.  4.  Mild luminal narrowing of the proximal and mid right coronary artery.

## 2023-05-12 NOTE — DISCHARGE NOTE PROVIDER - NSFOLLOWUPCLINICS_GEN_ALL_ED_FT
[facility]  Endocrinology  [address] [place] [address]  Phone: [phone]  Fax:   Follow Up Time: 2 weeks

## 2023-05-12 NOTE — CONSULT NOTE ADULT - PROBLEM SELECTOR RECOMMENDATION 9
Diabetes Education and Nutrition Eval  Current regimen is unbalanced as he is on high dose of basal with only correction scale.  Based on prior 24 hour insulin requirements would redistribute as thus:  Start Lantus 20 units qhs  Start Admelog 2 units qac for the rest  of today as she already received a higher dose of basal insulin last night.   Start Humalog 6 units qac starting tomorrow.   Low correction scale qac + bedtime  Goal glucose 100-180  Outpt. endo follow-up  Outpt. optho, podiatry, micro/cr  Plan to d/c on Diabetes Education and Nutrition Eval  Current regimen is unbalanced as he is on high dose of basal with only correction scale.  Based on prior 24 hour insulin requirements would redistribute as thus:  Start Lantus 20 units qhs  Start Admelog 2 units qac for the rest  of today as she already received a higher dose of basal insulin last night.   Start Humalog 6 units qac starting tomorrow.   Low correction scale qac + bedtime  Goal glucose 100-180  Outpt. endo follow-up  Outpt. optho, podiatry, micro/cr  Plan to d/c on Levemir 25 units daily, try to increase metformin slowly to optimized dose of 1000mg BID if tolerated, increase glimepiride to 4mg BID, and add Ozempic 0.25mg weekly with titration as tolerated. If Ozempic not covered can try another GLP-1 for additional CV, renal, and weight loss benefits.
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
## 2023-05-12 NOTE — DISCHARGE NOTE PROVIDER - NSDCCPCAREPLAN_GEN_ALL_CORE_FT
PRINCIPAL DISCHARGE DIAGNOSIS  Diagnosis: Chest pain  Assessment and Plan of Treatment: Negative Coronary CT Angio   Will be without chest pain  Follow up with your cardiologist after discharge from the hospital      SECONDARY DISCHARGE DIAGNOSES  Diagnosis: DM (diabetes mellitus)  Assessment and Plan of Treatment: Your hemoglobin A1C will be between 7-8.   Continue to follow with your primary care MD or your endocrinologist.  Follow a heart healthy diabetic diet. If you check your fingerstick glucose at home, call your MD if it is greater than 250mg/dL on 2 occasions or less than 100mg/dL on 2 occasions. Know signs of low blood sugar, such as: dizziness, shakiness, sweating, confusion, hunger, nervousness-drink 4 ounces apple juice if occurs and call your doctor. Know early signs of high blood sugar, such as: frequent urination, increased thirst, blurry vision, fatigue, headache - call your doctor if this occurs. Follow with other practitioners to care for your diabetes, such as ophthamologist and podiatrist.

## 2023-05-12 NOTE — DISCHARGE NOTE PROVIDER - NSDCFUSCHEDAPPT_GEN_ALL_CORE_FT
Chula Contreras  [facility]  [facility] [address] [facility]  Scheduled Appointment: 06/20/2023

## 2023-05-13 ENCOUNTER — TRANSCRIPTION ENCOUNTER (OUTPATIENT)
Age: 68
End: 2023-05-13

## 2023-05-13 VITALS
HEART RATE: 71 BPM | OXYGEN SATURATION: 99 % | TEMPERATURE: 98 F | DIASTOLIC BLOOD PRESSURE: 75 MMHG | SYSTOLIC BLOOD PRESSURE: 129 MMHG | RESPIRATION RATE: 16 BRPM

## 2023-05-13 LAB — GLUCOSE BLDC GLUCOMTR-MCNC: 154 MG/DL — HIGH (ref 70–99)

## 2023-05-13 PROCEDURE — 85652 RBC SED RATE AUTOMATED: CPT

## 2023-05-13 PROCEDURE — 85610 PROTHROMBIN TIME: CPT

## 2023-05-13 PROCEDURE — 83036 HEMOGLOBIN GLYCOSYLATED A1C: CPT

## 2023-05-13 PROCEDURE — 82553 CREATINE MB FRACTION: CPT

## 2023-05-13 PROCEDURE — 84100 ASSAY OF PHOSPHORUS: CPT

## 2023-05-13 PROCEDURE — 82550 ASSAY OF CK (CPK): CPT

## 2023-05-13 PROCEDURE — 80053 COMPREHEN METABOLIC PANEL: CPT

## 2023-05-13 PROCEDURE — 84443 ASSAY THYROID STIM HORMONE: CPT

## 2023-05-13 PROCEDURE — 99285 EMERGENCY DEPT VISIT HI MDM: CPT

## 2023-05-13 PROCEDURE — 85730 THROMBOPLASTIN TIME PARTIAL: CPT

## 2023-05-13 PROCEDURE — 36415 COLL VENOUS BLD VENIPUNCTURE: CPT

## 2023-05-13 PROCEDURE — 84484 ASSAY OF TROPONIN QUANT: CPT

## 2023-05-13 PROCEDURE — 83735 ASSAY OF MAGNESIUM: CPT

## 2023-05-13 PROCEDURE — 85027 COMPLETE CBC AUTOMATED: CPT

## 2023-05-13 PROCEDURE — 85025 COMPLETE CBC W/AUTO DIFF WBC: CPT

## 2023-05-13 PROCEDURE — 71045 X-RAY EXAM CHEST 1 VIEW: CPT

## 2023-05-13 PROCEDURE — 82962 GLUCOSE BLOOD TEST: CPT

## 2023-05-13 PROCEDURE — 83880 ASSAY OF NATRIURETIC PEPTIDE: CPT

## 2023-05-13 PROCEDURE — 86140 C-REACTIVE PROTEIN: CPT

## 2023-05-13 PROCEDURE — 75574 CT ANGIO HRT W/3D IMAGE: CPT

## 2023-05-13 RX ORDER — GLIMEPIRIDE 1 MG
2 TABLET ORAL
Qty: 120 | Refills: 1
Start: 2023-05-13

## 2023-05-13 RX ORDER — METFORMIN HYDROCHLORIDE 850 MG/1
2 TABLET ORAL
Qty: 0 | Refills: 0 | DISCHARGE

## 2023-05-13 RX ORDER — METFORMIN HYDROCHLORIDE 850 MG/1
2 TABLET ORAL
Qty: 120 | Refills: 1
Start: 2023-05-13

## 2023-05-13 RX ORDER — INSULIN DETEMIR 100/ML (3)
25 INSULIN PEN (ML) SUBCUTANEOUS
Qty: 1 | Refills: 1
Start: 2023-05-13

## 2023-05-13 RX ORDER — SEMAGLUTIDE 0.68 MG/ML
0.25 INJECTION, SOLUTION SUBCUTANEOUS
Qty: 1 | Refills: 0
Start: 2023-05-13 | End: 2023-06-11

## 2023-05-13 RX ORDER — INSULIN DETEMIR 100/ML (3)
50 INSULIN PEN (ML) SUBCUTANEOUS
Qty: 0 | Refills: 0 | DISCHARGE

## 2023-05-13 RX ORDER — GLIMEPIRIDE 1 MG
1 TABLET ORAL
Qty: 0 | Refills: 0 | DISCHARGE

## 2023-05-13 RX ADMIN — Medication 25 MILLIGRAM(S): at 04:55

## 2023-05-13 RX ADMIN — HEPARIN SODIUM 5000 UNIT(S): 5000 INJECTION INTRAVENOUS; SUBCUTANEOUS at 04:55

## 2023-05-13 RX ADMIN — Medication 6 UNIT(S): at 07:27

## 2023-05-13 RX ADMIN — Medication 1: at 07:27

## 2023-05-13 RX ADMIN — AMLODIPINE BESYLATE 2.5 MILLIGRAM(S): 2.5 TABLET ORAL at 04:55

## 2023-05-13 RX ADMIN — Medication 81 MILLIGRAM(S): at 04:55

## 2023-05-13 RX ADMIN — LISINOPRIL 5 MILLIGRAM(S): 2.5 TABLET ORAL at 04:55

## 2023-05-13 NOTE — DISCHARGE NOTE NURSING/CASE MANAGEMENT/SOCIAL WORK - NSDCFUADDAPPT_GEN_ALL_CORE_FT
AS per ENDOCRINOLOGY:     levemir 25 units daily  ****metformin 500mg BID, patient can increase by 500mg per day for 1 week as tolerated to maximum dose of 1000mg BID  glimeperide 4mg BID  ozempic 0.25mg weekly  follow up at Endocrine Practice at [address] [place] [address], [address], [place] [address]; Ph # [phone]

## 2023-05-13 NOTE — CHART NOTE - NSCHARTNOTEFT_GEN_A_CORE
as per endocrinology dc recs following RX sent to patient's pharmacy:  - Levemir 25units subcutaneously at bedtime   -Metformin 1000mg PO BID  - Glimepiride 4mg PO BID  and Ozempic 0.25mg subcutaneously once per week     above also discussed and agreed upon with patient   teaching done by primary RN

## 2023-05-13 NOTE — PROGRESS NOTE ADULT - SUBJECTIVE AND OBJECTIVE BOX
Date of Service: 05-11-23 @ 07:07           CARDIOLOGY     PROGRESS  NOTE   ________________________________________________    CHIEF COMPLAINT:Patient is a 68y old  Female who presents with a chief complaint of chest pain (10 May 2023 23:09)  no complain  	  REVIEW OF SYSTEMS:  CONSTITUTIONAL: No fever, weight loss, or fatigue  EYES: No eye pain, visual disturbances, or discharge  ENT:  No difficulty hearing, tinnitus, vertigo; No sinus or throat pain  NECK: No pain or stiffness  RESPIRATORY: No cough, wheezing, chills or hemoptysis; No Shortness of Breath  CARDIOVASCULAR: No chest pain, palpitations, passing out, dizziness, or leg swelling  GASTROINTESTINAL: No abdominal or epigastric pain. No nausea, vomiting, or hematemesis; No diarrhea or constipation. No melena or hematochezia.  GENITOURINARY: No dysuria, frequency, hematuria, or incontinence  NEUROLOGICAL: No headaches, memory loss, loss of strength, numbness, or tremors  SKIN: No itching, burning, rashes, or lesions   LYMPH Nodes: No enlarged glands  ENDOCRINE: No heat or cold intolerance; No hair loss  MUSCULOSKELETAL: No joint pain or swelling; No muscle, back, or extremity pain  PSYCHIATRIC: No depression, anxiety, mood swings, or difficulty sleeping  HEME/LYMPH: No easy bruising, or bleeding gums  ALLERGY AND IMMUNOLOGIC: No hives or eczema	    [x ] All others negative	  [ ] Unable to obtain    PHYSICAL EXAM:  T(C): 36.6 (05-11-23 @ 04:41), Max: 36.7 (05-10-23 @ 22:20)  HR: 79 (05-11-23 @ 04:41) (71 - 82)  BP: 118/71 (05-11-23 @ 04:41) (110/62 - 142/87)  RR: 18 (05-11-23 @ 04:41) (17 - 19)  SpO2: 97% (05-11-23 @ 04:41) (97% - 100%)  Wt(kg): --  I&O's Summary      Appearance: Normal	  HEENT:   Normal oral mucosa, PERRL, EOMI	  Lymphatic: No lymphadenopathy  Cardiovascular: Normal S1 S2, No JVD, +murmurs, No edema  Respiratory: Lungs clear to auscultation	  Psychiatry: A & O x 3, Mood & affect appropriate  Gastrointestinal:  Soft, Non-tender, + BS	  Skin: No rashes, No ecchymoses, No cyanosis	  Neurologic: Non-focal  Extremities: Normal range of motion, No clubbing, cyanosis or edema  Vascular: Peripheral pulses palpable 2+ bilaterally    MEDICATIONS  (STANDING):  amLODIPine   Tablet 2.5 milliGRAM(s) Oral daily  aspirin enteric coated 81 milliGRAM(s) Oral daily  dextrose 5%. 1000 milliLiter(s) (100 mL/Hr) IV Continuous <Continuous>  dextrose 5%. 1000 milliLiter(s) (50 mL/Hr) IV Continuous <Continuous>  dextrose 50% Injectable 12.5 Gram(s) IV Push once  dextrose 50% Injectable 25 Gram(s) IV Push once  dextrose 50% Injectable 25 Gram(s) IV Push once  glucagon  Injectable 1 milliGRAM(s) IntraMuscular once  heparin   Injectable 5000 Unit(s) SubCutaneous every 12 hours  insulin glargine Injectable (LANTUS) 30 Unit(s) SubCutaneous at bedtime  insulin lispro (ADMELOG) corrective regimen sliding scale   SubCutaneous three times a day before meals  lisinopril 5 milliGRAM(s) Oral daily  metoprolol tartrate 25 milliGRAM(s) Oral two times a day  simvastatin 10 milliGRAM(s) Oral at bedtime      TELEMETRY: 	    ECG:  	  RADIOLOGY:  OTHER: 	  	  LABS:	 	    CARDIAC MARKERS:                                12.0   8.59  )-----------( 331      ( 10 May 2023 20:59 )             37.8     05-10    133<L>  |  96  |  12  ----------------------------<  259<H>  4.7   |  22  |  0.61    Ca    9.5      10 May 2023 20:59    TPro  8.2  /  Alb  4.4  /  TBili  0.3  /  DBili  x   /  AST  72<H>  /  ALT  96<H>  /  AlkPhos  90  05-10    proBNP:   Lipid Profile:   HgA1c:   TSH:   PT/INR - ( 10 May 2023 20:59 )   PT: 12.0 sec;   INR: 1.04 ratio         PTT - ( 10 May 2023 20:59 )  PTT:30.4 sec    < from: Xray Chest 1 View AP/PA (05.10.23 @ 20:50) >  The lungs are clear.  There is no pleural effusion or pneumothorax.  The heart size is normal.  The visualized osseous and soft tissue structures demonstrate no acute   pathology.    IMPRESSION:  Clear lungs.      Assessment and plan  ---------------------------  68F with PMH/PSH including DM, HLD, HTN presents to the ED with L chest pain.  Reports started two days ago at rest, reports sharp pain in area under L breast.  Reports sudden onset, reports lasts about 5 min, resolves spontaneously.  Reports associated with shortness of breath, weakness, reports sometimes has diaphoresis with symptoms.  Denies LE edema.  Denies positional, pleuritic.  Denies rash.  Reports had stress test with Dr. García in his office about 2-3 months ago.  Denies taking anything for pain over the last two days.  Denies recent travel, sick contacts.  Denies abdominal pain, nausea, vomiting, diarrhea, urinary complaints  pt with sig cardiac risk factor with recent stress test with abnormality in inferior wall with mild ischemia with chest pain , reproducible on palpation  tele noted  cardiac enzymes  asa daily  continue all cardiac meds  cardiac ct today  	        
Date of Service: 05-12-23 @ 09:34           CARDIOLOGY     PROGRESS  NOTE   ________________________________________________    CHIEF COMPLAINT:Patient is a 68y old  Female who presents with a chief complaint of chest pain (11 May 2023 07:06)  no complain  	  REVIEW OF SYSTEMS:  CONSTITUTIONAL: No fever, weight loss, or fatigue  EYES: No eye pain, visual disturbances, or discharge  ENT:  No difficulty hearing, tinnitus, vertigo; No sinus or throat pain  NECK: No pain or stiffness  RESPIRATORY: No cough, wheezing, chills or hemoptysis; No Shortness of Breath  CARDIOVASCULAR: No chest pain, palpitations, passing out, dizziness, or leg swelling  GASTROINTESTINAL: No abdominal or epigastric pain. No nausea, vomiting, or hematemesis; No diarrhea or constipation. No melena or hematochezia.  GENITOURINARY: No dysuria, frequency, hematuria, or incontinence  NEUROLOGICAL: No headaches, memory loss, loss of strength, numbness, or tremors  SKIN: No itching, burning, rashes, or lesions   LYMPH Nodes: No enlarged glands  ENDOCRINE: No heat or cold intolerance; No hair loss  MUSCULOSKELETAL: No joint pain or swelling; No muscle, back, or extremity pain  PSYCHIATRIC: No depression, anxiety, mood swings, or difficulty sleeping  HEME/LYMPH: No easy bruising, or bleeding gums  ALLERGY AND IMMUNOLOGIC: No hives or eczema	    [ ] All others negative	  [ ] Unable to obtain    PHYSICAL EXAM:  T(C): 36.4 (05-12-23 @ 07:52), Max: 36.7 (05-11-23 @ 16:53)  HR: 68 (05-12-23 @ 07:52) (62 - 82)  BP: 147/81 (05-12-23 @ 07:52) (100/60 - 147/81)  RR: 17 (05-12-23 @ 07:52) (17 - 19)  SpO2: 100% (05-12-23 @ 07:52) (98% - 100%)  Wt(kg): --  I&O's Summary    11 May 2023 07:01  -  12 May 2023 07:00  --------------------------------------------------------  IN: 240 mL / OUT: 0 mL / NET: 240 mL    12 May 2023 07:01  -  12 May 2023 09:34  --------------------------------------------------------  IN: 240 mL / OUT: 1 mL / NET: 239 mL        Appearance: Normal	  HEENT:   Normal oral mucosa, PERRL, EOMI	  Lymphatic: No lymphadenopathy  Cardiovascular: Normal S1 S2, No JVD, +murmurs, No edema  Respiratory: Lungs clear to auscultation	  Psychiatry: A & O x 3, Mood & affect appropriate  Gastrointestinal:  Soft, Non-tender, + BS	  Skin: No rashes, No ecchymoses, No cyanosis	  Neurologic: Non-focal  Extremities: Normal range of motion, No clubbing, cyanosis or edema  Vascular: Peripheral pulses palpable 2+ bilaterally    MEDICATIONS  (STANDING):  amLODIPine   Tablet 2.5 milliGRAM(s) Oral daily  aspirin enteric coated 81 milliGRAM(s) Oral daily  dextrose 5%. 1000 milliLiter(s) (100 mL/Hr) IV Continuous <Continuous>  dextrose 5%. 1000 milliLiter(s) (50 mL/Hr) IV Continuous <Continuous>  dextrose 50% Injectable 25 Gram(s) IV Push once  dextrose 50% Injectable 25 Gram(s) IV Push once  dextrose 50% Injectable 12.5 Gram(s) IV Push once  glucagon  Injectable 1 milliGRAM(s) IntraMuscular once  heparin   Injectable 5000 Unit(s) SubCutaneous every 12 hours  insulin glargine Injectable (LANTUS) 30 Unit(s) SubCutaneous at bedtime  insulin lispro (ADMELOG) corrective regimen sliding scale   SubCutaneous at bedtime  insulin lispro (ADMELOG) corrective regimen sliding scale   SubCutaneous three times a day before meals  lisinopril 5 milliGRAM(s) Oral daily  metoprolol tartrate 25 milliGRAM(s) Oral two times a day  simvastatin 10 milliGRAM(s) Oral at bedtime      TELEMETRY: 	    ECG:  	  RADIOLOGY:  OTHER: 	  	  LABS:	 	    CARDIAC MARKERS:  CARDIAC MARKERS ( 11 May 2023 06:53 )  x     / x     / 146 U/L / x     / 2.4 ng/mL                                11.3   8.60  )-----------( 362      ( 11 May 2023 06:53 )             35.8     05-11    135  |  98  |  12  ----------------------------<  200<H>  3.9   |  22  |  0.59    Ca    9.4      11 May 2023 06:53  Phos  3.6     05-11  Mg     1.7     05-11    TPro  7.8  /  Alb  4.0  /  TBili  0.3  /  DBili  x   /  AST  60<H>  /  ALT  92<H>  /  AlkPhos  86  05-11    proBNP:   Lipid Profile:   HgA1c:   TSH: Thyroid Stimulating Hormone, Serum: 2.45 uIU/mL (05-11 @ 06:53)    PT/INR - ( 10 May 2023 20:59 )   PT: 12.0 sec;   INR: 1.04 ratio         PTT - ( 10 May 2023 20:59 )  PTT:30.4 sec    < from: CT Angio Heart and Coronaries w/ IV Cont (05.11.23 @ 16:50) >  1.  Mild luminal narrowing of the proximal and mid left anterior   descending coronary artery.  2.  Mild luminal narrowing of the second diagonal division  3.  Mild luminal narrowing of the proximal circumflex coronary artery and   first obtuse marginal division.  4.  Mild luminal narrowing of the proximal and mid right coronary artery.        Assessment and plan  ---------------------------  68F with PMH/PSH including DM, HLD, HTN presents to the ED with L chest pain.  Reports started two days ago at rest, reports sharp pain in area under L breast.  Reports sudden onset, reports lasts about 5 min, resolves spontaneously.  Reports associated with shortness of breath, weakness, reports sometimes has diaphoresis with symptoms.  Denies LE edema.  Denies positional, pleuritic.  Denies rash.  Reports had stress test with Dr. García in his office about 2-3 months ago.  Denies taking anything for pain over the last two days.  Denies recent travel, sick contacts.  Denies abdominal pain, nausea, vomiting, diarrhea, urinary complaints  pt with sig cardiac risk factor with recent stress test with abnormality in inferior wall with mild ischemia with chest pain , reproducible on palpation  tele noted  cardiac enzymes  asa daily  continue all cardiac meds  cardiac ct noted  increase Hgb A1c  endocrine eval prior to dc  increase esr will  follow up pt has no  sign of acute infection or complain      	        
Date of Service: 05-13-23 @ 09:23           CARDIOLOGY     PROGRESS  NOTE   ________________________________________________    CHIEF COMPLAINT:Patient is a 68y old  Female who presents with a chief complaint of chest pain (12 May 2023 22:16)  no complain  	  REVIEW OF SYSTEMS:  CONSTITUTIONAL: No fever, weight loss, or fatigue  EYES: No eye pain, visual disturbances, or discharge  ENT:  No difficulty hearing, tinnitus, vertigo; No sinus or throat pain  NECK: No pain or stiffness  RESPIRATORY: No cough, wheezing, chills or hemoptysis; No Shortness of Breath  CARDIOVASCULAR: No chest pain, palpitations, passing out, dizziness, or leg swelling  GASTROINTESTINAL: No abdominal or epigastric pain. No nausea, vomiting, or hematemesis; No diarrhea or constipation. No melena or hematochezia.  GENITOURINARY: No dysuria, frequency, hematuria, or incontinence  NEUROLOGICAL: No headaches, memory loss, loss of strength, numbness, or tremors  SKIN: No itching, burning, rashes, or lesions   LYMPH Nodes: No enlarged glands  ENDOCRINE: No heat or cold intolerance; No hair loss  MUSCULOSKELETAL: No joint pain or swelling; No muscle, back, or extremity pain  PSYCHIATRIC: No depression, anxiety, mood swings, or difficulty sleeping  HEME/LYMPH: No easy bruising, or bleeding gums  ALLERGY AND IMMUNOLOGIC: No hives or eczema	    [x ] All others negative	  [ ] Unable to obtain    PHYSICAL EXAM:  T(C): 36.7 (05-13-23 @ 08:19), Max: 36.7 (05-12-23 @ 13:06)  HR: 66 (05-13-23 @ 08:19) (66 - 74)  BP: 114/55 (05-13-23 @ 08:19) (114/55 - 140/70)  RR: 17 (05-13-23 @ 08:19) (17 - 17)  SpO2: 96% (05-13-23 @ 08:19) (96% - 100%)  Wt(kg): --  I&O's Summary    12 May 2023 07:01  -  13 May 2023 07:00  --------------------------------------------------------  IN: 480 mL / OUT: 1 mL / NET: 479 mL    13 May 2023 07:01  -  13 May 2023 09:23  --------------------------------------------------------  IN: 240 mL / OUT: 0 mL / NET: 240 mL        Appearance: Normal	  HEENT:   Normal oral mucosa, PERRL, EOMI	  Lymphatic: No lymphadenopathy  Cardiovascular: Normal S1 S2, No JVD, + murmurs, No edema  Respiratory: Lungs clear to auscultation	  Psychiatry: A & O x 3, Mood & affect appropriate  Gastrointestinal:  Soft, Non-tender, + BS	  Skin: No rashes, No ecchymoses, No cyanosis	  Neurologic: Non-focal  Extremities: Normal range of motion, No clubbing, cyanosis or edema  Vascular: Peripheral pulses palpable 2+ bilaterally    MEDICATIONS  (STANDING):  amLODIPine   Tablet 2.5 milliGRAM(s) Oral daily  aspirin enteric coated 81 milliGRAM(s) Oral daily  dextrose 5%. 1000 milliLiter(s) (100 mL/Hr) IV Continuous <Continuous>  dextrose 5%. 1000 milliLiter(s) (50 mL/Hr) IV Continuous <Continuous>  dextrose 50% Injectable 12.5 Gram(s) IV Push once  dextrose 50% Injectable 25 Gram(s) IV Push once  dextrose 50% Injectable 25 Gram(s) IV Push once  glucagon  Injectable 1 milliGRAM(s) IntraMuscular once  heparin   Injectable 5000 Unit(s) SubCutaneous every 12 hours  insulin glargine Injectable (LANTUS) 20 Unit(s) SubCutaneous at bedtime  insulin lispro (ADMELOG) corrective regimen sliding scale   SubCutaneous at bedtime  insulin lispro (ADMELOG) corrective regimen sliding scale   SubCutaneous three times a day before meals  insulin lispro Injectable (ADMELOG) 6 Unit(s) SubCutaneous three times a day before meals  lisinopril 5 milliGRAM(s) Oral daily  metoprolol tartrate 25 milliGRAM(s) Oral two times a day  simvastatin 10 milliGRAM(s) Oral at bedtime      TELEMETRY: 	    ECG:  	  RADIOLOGY:  OTHER: 	  	  LABS:	 	    CARDIAC MARKERS:                  proBNP:   Lipid Profile:   HgA1c:   TSH: Thyroid Stimulating Hormone, Serum: 2.45 uIU/mL (05-11 @ 06:53)          Assessment and plan  ---------------------------  68F with PMH/PSH including DM, HLD, HTN presents to the ED with L chest pain.  Reports started two days ago at rest, reports sharp pain in area under L breast.  Reports sudden onset, reports lasts about 5 min, resolves spontaneously.  Reports associated with shortness of breath, weakness, reports sometimes has diaphoresis with symptoms.  Denies LE edema.  Denies positional, pleuritic.  Denies rash.  Reports had stress test with Dr. García in his office about 2-3 months ago.  Denies taking anything for pain over the last two days.  Denies recent travel, sick contacts.  Denies abdominal pain, nausea, vomiting, diarrhea, urinary complaints  pt with sig cardiac risk factor with recent stress test with abnormality in inferior wall with mild ischemia with chest pain , reproducible on palpation  tele noted  cardiac enzymes  asa daily  continue all cardiac meds  cardiac ct noted  increase Hgb A1c  endocrine eval prior to dc  increase esr will  follow up pt has no  sign of acute infection or complain  endocrine eval  dc planning as clear by endocrine

## 2023-05-13 NOTE — DISCHARGE NOTE NURSING/CASE MANAGEMENT/SOCIAL WORK - PATIENT PORTAL LINK FT
You can access the FollowMyHealth Patient Portal offered by [facility] by registering at the following website: [email or web address]. By joining [employer]’s FollowMyHealth portal, you will also be able to view your health information using other applications (apps) compatible with our system.

## 2023-05-13 NOTE — CHART NOTE - NSCHARTNOTEFT_GEN_A_CORE
Brief Endocrine Chart Note:    Contacted by team for discharge recs    Patient has a history of poorly controlled DM2 /w HbA1c 10.2% and presents with chest pain. Planned for discharge today.    Recommendations as per Dr. Serna note yesterday, as follows:  levemir 25 units daily  metformin 500mg BID, patient can increase by 500mg per day for 1 week as tolerated to maximum dose of 1000mg BID  glimeperide 4mg BID  ozempic 0.25mg weekly  follow up at Endocrine Practice at [address] [place] [address], [address], [place] [address];  # [phone]    Ming Ramachandran MD  Endocrine Fellow  Can be reached via teams. For follow up questions, discharge recommendations, or new consults, please call answering service at [phone] (weekdays); [phone] (nights/weekends)

## 2023-06-20 ENCOUNTER — APPOINTMENT (OUTPATIENT)
Dept: OBGYN | Facility: CLINIC | Age: 68
End: 2023-06-20

## 2023-07-14 ENCOUNTER — APPOINTMENT (OUTPATIENT)
Dept: ENDOCRINOLOGY | Facility: CLINIC | Age: 68
End: 2023-07-14

## 2023-08-20 ENCOUNTER — EMERGENCY (EMERGENCY)
Facility: HOSPITAL | Age: 68
LOS: 1 days | Discharge: HOME | End: 2023-08-20
Attending: EMERGENCY MEDICINE
Payer: COMMERCIAL

## 2023-08-20 VITALS
HEART RATE: 100 BPM | OXYGEN SATURATION: 99 % | DIASTOLIC BLOOD PRESSURE: 79 MMHG | RESPIRATION RATE: 18 BRPM | SYSTOLIC BLOOD PRESSURE: 138 MMHG

## 2023-08-20 VITALS
OXYGEN SATURATION: 98 % | SYSTOLIC BLOOD PRESSURE: 133 MMHG | WEIGHT: 169.98 LBS | DIASTOLIC BLOOD PRESSURE: 80 MMHG | HEIGHT: 65 IN | TEMPERATURE: 99 F | HEART RATE: 104 BPM | RESPIRATION RATE: 19 BRPM

## 2023-08-20 LAB
ALBUMIN SERPL ELPH-MCNC: 3.3 G/DL — SIGNIFICANT CHANGE UP (ref 3.3–5)
ALBUMIN SERPL ELPH-MCNC: 3.8 G/DL — SIGNIFICANT CHANGE UP (ref 3.3–5)
ALP SERPL-CCNC: 61 U/L — SIGNIFICANT CHANGE UP (ref 40–120)
ALP SERPL-CCNC: 74 U/L — SIGNIFICANT CHANGE UP (ref 40–120)
ALT FLD-CCNC: 10 U/L — SIGNIFICANT CHANGE UP (ref 10–45)
ALT FLD-CCNC: 15 U/L — SIGNIFICANT CHANGE UP (ref 10–45)
ANION GAP SERPL CALC-SCNC: 11 MMOL/L — SIGNIFICANT CHANGE UP (ref 5–17)
ANION GAP SERPL CALC-SCNC: 15 MMOL/L — SIGNIFICANT CHANGE UP (ref 5–17)
APPEARANCE UR: ABNORMAL
AST SERPL-CCNC: 13 U/L — SIGNIFICANT CHANGE UP (ref 10–40)
AST SERPL-CCNC: 30 U/L — SIGNIFICANT CHANGE UP (ref 10–40)
BACTERIA # UR AUTO: ABNORMAL
BASE EXCESS BLDV CALC-SCNC: 2.8 MMOL/L — SIGNIFICANT CHANGE UP (ref -2–3)
BASOPHILS # BLD AUTO: 0.05 K/UL — SIGNIFICANT CHANGE UP (ref 0–0.2)
BASOPHILS NFR BLD AUTO: 0.4 % — SIGNIFICANT CHANGE UP (ref 0–2)
BILIRUB SERPL-MCNC: 0.2 MG/DL — SIGNIFICANT CHANGE UP (ref 0.2–1.2)
BILIRUB SERPL-MCNC: 0.3 MG/DL — SIGNIFICANT CHANGE UP (ref 0.2–1.2)
BILIRUB UR-MCNC: NEGATIVE — SIGNIFICANT CHANGE UP
BUN SERPL-MCNC: 15 MG/DL — SIGNIFICANT CHANGE UP (ref 7–23)
BUN SERPL-MCNC: 16 MG/DL — SIGNIFICANT CHANGE UP (ref 7–23)
CA-I SERPL-SCNC: 1.19 MMOL/L — SIGNIFICANT CHANGE UP (ref 1.15–1.33)
CALCIUM SERPL-MCNC: 8.4 MG/DL — SIGNIFICANT CHANGE UP (ref 8.4–10.5)
CALCIUM SERPL-MCNC: 9.3 MG/DL — SIGNIFICANT CHANGE UP (ref 8.4–10.5)
CHLORIDE BLDV-SCNC: 99 MMOL/L — SIGNIFICANT CHANGE UP (ref 96–108)
CHLORIDE SERPL-SCNC: 100 MMOL/L — SIGNIFICANT CHANGE UP (ref 96–108)
CHLORIDE SERPL-SCNC: 97 MMOL/L — SIGNIFICANT CHANGE UP (ref 96–108)
CO2 BLDV-SCNC: 28 MMOL/L — HIGH (ref 22–26)
CO2 SERPL-SCNC: 20 MMOL/L — LOW (ref 22–31)
CO2 SERPL-SCNC: 21 MMOL/L — LOW (ref 22–31)
COLOR SPEC: YELLOW — SIGNIFICANT CHANGE UP
CREAT SERPL-MCNC: 0.78 MG/DL — SIGNIFICANT CHANGE UP (ref 0.5–1.3)
CREAT SERPL-MCNC: 0.81 MG/DL — SIGNIFICANT CHANGE UP (ref 0.5–1.3)
DIFF PNL FLD: NEGATIVE — SIGNIFICANT CHANGE UP
EGFR: 80 ML/MIN/1.73M2 — SIGNIFICANT CHANGE UP
EGFR: 84 ML/MIN/1.73M2 — SIGNIFICANT CHANGE UP
EOSINOPHIL # BLD AUTO: 0.26 K/UL — SIGNIFICANT CHANGE UP (ref 0–0.5)
EOSINOPHIL NFR BLD AUTO: 2.3 % — SIGNIFICANT CHANGE UP (ref 0–6)
EPI CELLS # UR: 1 — SIGNIFICANT CHANGE UP
GAS PNL BLDV: 132 MMOL/L — LOW (ref 136–145)
GAS PNL BLDV: SIGNIFICANT CHANGE UP
GAS PNL BLDV: SIGNIFICANT CHANGE UP
GLUCOSE BLDV-MCNC: 297 MG/DL — HIGH (ref 70–99)
GLUCOSE SERPL-MCNC: 147 MG/DL — HIGH (ref 70–99)
GLUCOSE SERPL-MCNC: 279 MG/DL — HIGH (ref 70–99)
GLUCOSE UR QL: ABNORMAL
HCO3 BLDV-SCNC: 27 MMOL/L — SIGNIFICANT CHANGE UP (ref 22–29)
HCT VFR BLD CALC: 32.1 % — LOW (ref 34.5–45)
HCT VFR BLDA CALC: 31 % — LOW (ref 34.5–46.5)
HGB BLD CALC-MCNC: 10.4 G/DL — LOW (ref 11.7–16.1)
HGB BLD-MCNC: 10.2 G/DL — LOW (ref 11.5–15.5)
HYALINE CASTS # UR AUTO: 2 /LPF — SIGNIFICANT CHANGE UP (ref 0–7)
IMM GRANULOCYTES NFR BLD AUTO: 0.4 % — SIGNIFICANT CHANGE UP (ref 0–0.9)
KETONES UR-MCNC: NEGATIVE — SIGNIFICANT CHANGE UP
LACTATE BLDV-MCNC: 1.5 MMOL/L — SIGNIFICANT CHANGE UP (ref 0.5–2)
LEUKOCYTE ESTERASE UR-ACNC: ABNORMAL
LYMPHOCYTES # BLD AUTO: 3.54 K/UL — HIGH (ref 1–3.3)
LYMPHOCYTES # BLD AUTO: 31.6 % — SIGNIFICANT CHANGE UP (ref 13–44)
MCHC RBC-ENTMCNC: 25.6 PG — LOW (ref 27–34)
MCHC RBC-ENTMCNC: 31.8 GM/DL — LOW (ref 32–36)
MCV RBC AUTO: 80.5 FL — SIGNIFICANT CHANGE UP (ref 80–100)
MONOCYTES # BLD AUTO: 0.78 K/UL — SIGNIFICANT CHANGE UP (ref 0–0.9)
MONOCYTES NFR BLD AUTO: 7 % — SIGNIFICANT CHANGE UP (ref 2–14)
NEUTROPHILS # BLD AUTO: 6.52 K/UL — SIGNIFICANT CHANGE UP (ref 1.8–7.4)
NEUTROPHILS NFR BLD AUTO: 58.3 % — SIGNIFICANT CHANGE UP (ref 43–77)
NITRITE UR-MCNC: NEGATIVE — SIGNIFICANT CHANGE UP
NRBC # BLD: 0 /100 WBCS — SIGNIFICANT CHANGE UP (ref 0–0)
PCO2 BLDV: 40 MMHG — SIGNIFICANT CHANGE UP (ref 39–42)
PH BLDV: 7.44 — HIGH (ref 7.32–7.43)
PH UR: 6.5 — SIGNIFICANT CHANGE UP (ref 5–8)
PLATELET # BLD AUTO: 308 K/UL — SIGNIFICANT CHANGE UP (ref 150–400)
PO2 BLDV: 48 MMHG — HIGH (ref 25–45)
POTASSIUM BLDV-SCNC: 5.3 MMOL/L — HIGH (ref 3.5–5.1)
POTASSIUM SERPL-MCNC: 4.5 MMOL/L — SIGNIFICANT CHANGE UP (ref 3.5–5.3)
POTASSIUM SERPL-MCNC: 5.5 MMOL/L — HIGH (ref 3.5–5.3)
POTASSIUM SERPL-SCNC: 4.5 MMOL/L — SIGNIFICANT CHANGE UP (ref 3.5–5.3)
POTASSIUM SERPL-SCNC: 5.5 MMOL/L — HIGH (ref 3.5–5.3)
PROT SERPL-MCNC: 7.1 G/DL — SIGNIFICANT CHANGE UP (ref 6–8.3)
PROT SERPL-MCNC: 8.2 G/DL — SIGNIFICANT CHANGE UP (ref 6–8.3)
PROT UR-MCNC: ABNORMAL
RAPID RVP RESULT: SIGNIFICANT CHANGE UP
RBC # BLD: 3.99 M/UL — SIGNIFICANT CHANGE UP (ref 3.8–5.2)
RBC # FLD: 13.6 % — SIGNIFICANT CHANGE UP (ref 10.3–14.5)
RBC CASTS # UR COMP ASSIST: 2 /HPF — SIGNIFICANT CHANGE UP (ref 0–4)
SAO2 % BLDV: 81.8 % — SIGNIFICANT CHANGE UP (ref 67–88)
SARS-COV-2 RNA SPEC QL NAA+PROBE: SIGNIFICANT CHANGE UP
SODIUM SERPL-SCNC: 132 MMOL/L — LOW (ref 135–145)
SODIUM SERPL-SCNC: 132 MMOL/L — LOW (ref 135–145)
SP GR SPEC: 1.02 — SIGNIFICANT CHANGE UP (ref 1.01–1.02)
TROPONIN T, HIGH SENSITIVITY RESULT: 17 NG/L — SIGNIFICANT CHANGE UP (ref 0–51)
UROBILINOGEN FLD QL: NEGATIVE — SIGNIFICANT CHANGE UP
WBC # BLD: 11.2 K/UL — HIGH (ref 3.8–10.5)
WBC # FLD AUTO: 11.2 K/UL — HIGH (ref 3.8–10.5)
WBC UR QL: 47 /HPF — HIGH (ref 0–5)

## 2023-08-20 PROCEDURE — G1004: CPT

## 2023-08-20 PROCEDURE — 82435 ASSAY OF BLOOD CHLORIDE: CPT

## 2023-08-20 PROCEDURE — 84295 ASSAY OF SERUM SODIUM: CPT

## 2023-08-20 PROCEDURE — 82803 BLOOD GASES ANY COMBINATION: CPT

## 2023-08-20 PROCEDURE — 87086 URINE CULTURE/COLONY COUNT: CPT

## 2023-08-20 PROCEDURE — 99285 EMERGENCY DEPT VISIT HI MDM: CPT | Mod: 25

## 2023-08-20 PROCEDURE — 99285 EMERGENCY DEPT VISIT HI MDM: CPT

## 2023-08-20 PROCEDURE — 82330 ASSAY OF CALCIUM: CPT

## 2023-08-20 PROCEDURE — 96374 THER/PROPH/DIAG INJ IV PUSH: CPT | Mod: XU

## 2023-08-20 PROCEDURE — 83605 ASSAY OF LACTIC ACID: CPT

## 2023-08-20 PROCEDURE — 80053 COMPREHEN METABOLIC PANEL: CPT

## 2023-08-20 PROCEDURE — 82947 ASSAY GLUCOSE BLOOD QUANT: CPT

## 2023-08-20 PROCEDURE — 70496 CT ANGIOGRAPHY HEAD: CPT | Mod: MG

## 2023-08-20 PROCEDURE — 71046 X-RAY EXAM CHEST 2 VIEWS: CPT | Mod: 26

## 2023-08-20 PROCEDURE — 81001 URINALYSIS AUTO W/SCOPE: CPT

## 2023-08-20 PROCEDURE — 36415 COLL VENOUS BLD VENIPUNCTURE: CPT

## 2023-08-20 PROCEDURE — 85025 COMPLETE CBC W/AUTO DIFF WBC: CPT

## 2023-08-20 PROCEDURE — 70496 CT ANGIOGRAPHY HEAD: CPT | Mod: 26,MG

## 2023-08-20 PROCEDURE — 85018 HEMOGLOBIN: CPT

## 2023-08-20 PROCEDURE — 96375 TX/PRO/DX INJ NEW DRUG ADDON: CPT | Mod: XU

## 2023-08-20 PROCEDURE — 71046 X-RAY EXAM CHEST 2 VIEWS: CPT

## 2023-08-20 PROCEDURE — 85014 HEMATOCRIT: CPT

## 2023-08-20 PROCEDURE — 70498 CT ANGIOGRAPHY NECK: CPT | Mod: 26,MG

## 2023-08-20 PROCEDURE — 70450 CT HEAD/BRAIN W/O DYE: CPT | Mod: MG

## 2023-08-20 PROCEDURE — 0225U NFCT DS DNA&RNA 21 SARSCOV2: CPT

## 2023-08-20 PROCEDURE — 93005 ELECTROCARDIOGRAM TRACING: CPT

## 2023-08-20 PROCEDURE — 84484 ASSAY OF TROPONIN QUANT: CPT

## 2023-08-20 PROCEDURE — 84132 ASSAY OF SERUM POTASSIUM: CPT

## 2023-08-20 PROCEDURE — 70498 CT ANGIOGRAPHY NECK: CPT | Mod: MG

## 2023-08-20 PROCEDURE — 70450 CT HEAD/BRAIN W/O DYE: CPT | Mod: 26,MG,59

## 2023-08-20 PROCEDURE — 87186 SC STD MICRODIL/AGAR DIL: CPT

## 2023-08-20 RX ORDER — SODIUM CHLORIDE 9 MG/ML
1000 INJECTION INTRAMUSCULAR; INTRAVENOUS; SUBCUTANEOUS ONCE
Refills: 0 | Status: COMPLETED | OUTPATIENT
Start: 2023-08-20 | End: 2023-08-20

## 2023-08-20 RX ORDER — ACETAMINOPHEN 500 MG
1000 TABLET ORAL ONCE
Refills: 0 | Status: COMPLETED | OUTPATIENT
Start: 2023-08-20 | End: 2023-08-20

## 2023-08-20 RX ORDER — CEFPODOXIME PROXETIL 100 MG
1 TABLET ORAL
Qty: 14 | Refills: 0
Start: 2023-08-20 | End: 2023-08-26

## 2023-08-20 RX ORDER — CEFTRIAXONE 500 MG/1
1000 INJECTION, POWDER, FOR SOLUTION INTRAMUSCULAR; INTRAVENOUS ONCE
Refills: 0 | Status: COMPLETED | OUTPATIENT
Start: 2023-08-20 | End: 2023-08-20

## 2023-08-20 RX ADMIN — SODIUM CHLORIDE 1000 MILLILITER(S): 9 INJECTION INTRAMUSCULAR; INTRAVENOUS; SUBCUTANEOUS at 16:50

## 2023-08-20 RX ADMIN — Medication 400 MILLIGRAM(S): at 16:51

## 2023-08-20 RX ADMIN — CEFTRIAXONE 100 MILLIGRAM(S): 500 INJECTION, POWDER, FOR SOLUTION INTRAMUSCULAR; INTRAVENOUS at 19:42

## 2023-08-20 NOTE — ED ADULT NURSE NOTE - AS PAIN REST
SUBJECTIVE:  Sudhakar Montoya, a 62 year old female scheduled an appointment to discuss the following issues:     Fever, unknown origin  Influenza B    Medical, social, surgical, and family histories reviewed.    Urgent Care      URI  Possible flu x2 days- Started like an allergy, cough, fever, HA. Exposed to influenza A      As above.  Flu-like sxs, headache, fever, myalgia.    ROS:  See HPI.  No nausea/vomiting.  No chest pain/SOB.  No BM/urine problems.  No dizziness or syncope.      OBJECTIVE:  /72 (BP Location: Right arm, Patient Position: Chair, Cuff Size: Adult Regular)  Pulse 101  Temp 99.7  F (37.6  C) (Oral)  SpO2 96%  EXAM:  GENERAL APPEARANCE:  alert and mild distress; no cyanosis or accessory muscle use; moist mucus membrane  EYES: Eyes grossly normal to inspection, PERRL and conjunctivae and sclerae normal  HENT: ear canals and TM's normal and nose and mouth without ulcers or lesions  NECK: no adenopathy, no asymmetry, masses, or scars and thyroid normal to palpation  RESP: lungs clear to auscultation - no rales, rhonchi or wheezes  CV: regular rates and rhythm, normal S1 S2, no S3 or S4 and no murmur, click or rub  LYMPHATICS: normal ant/post cervical and supraclavicular nodes  ABDOMEN: soft, nontender, without hepatosplenomegaly or masses and bowel sounds normal  MS: extremities normal- no gross deformities noted  SKIN: no suspicious lesions or rashes  NEURO: Normal strength and tone, mentation intact and speech normal      ASSESSMENT/PLAN:  (R50.9) Fever, unknown origin  (primary encounter diagnosis)  Comment: Influenza A negative; B positive  Plan: Influenza A/B antigen      (J10.1) Influenza B  Plan: oseltamivir (TAMIFLU) 75 MG capsule    Fluid, rest.  Pt to f/up PCP if no improvement or worsening.  Warning signs and symptoms explained.       0 (no pain/absence of nonverbal indicators of pain)

## 2023-08-20 NOTE — CONSULT NOTE ADULT - NSCONSULTADDITIONALINFOA_GEN_ALL_CORE
Attending Attestation:     Pt discussed with me over the phone.   I agree with assessment and plan.  Pt discharged from ED prior to attending evaluation.    Tracey Hudson M.D

## 2023-08-20 NOTE — CONSULT NOTE ADULT - SUBJECTIVE AND OBJECTIVE BOX
Neurology - Consult Note    -  Spectra: [phone] ([facility]), [phone] ([facility])  -    HPI:   Patient JOSHUA AGUIAR is a 68y (1955) woman with a PMHx significant for DM, HLD, HTN who presented to ED on 8/20/2023 with worsening weakness and dizziness for several months. She started ozempic 3 months ago at which time she started having a lot of diarrhea a few weeks in. States she started feeling very weak with dizziness ("room spinning"), lightheadedness, headaches.  She stopped taking ozempic 3 weeks ago but her weakness, dizziness and headaches were worsening to the point where she was needing someone's assistance for walking (previously able to walk independently with cane). Apparently had presyncopal 3 weeks ago where she had LOC and fell to ground but did not hit head, she fell on her bed. Does not recall any chest pain preceding LOC. She was recently in [facility] where she got some lab work done, she brought with her to the ED and it showed elevated white count, low Hgb, UTI. Told by her PCP to come in but came to ED instead. Neurology consulted by ED for +Romberg (to L), unsteady gait, L side parasthesias in L5,S1, occasional slurred speech, continued weakness and room spinning.       Review of Systems:    CONSTITUTIONAL: No fevers or chills  EYES AND ENT: No visual changes or no throat pain   NECK: No pain or stiffness  RESPIRATORY: No hemoptysis or shortness of breath  CARDIOVASCULAR: No chest pain or palpitations  GASTROINTESTINAL: No melena or hematochezia  GENITOURINARY: No dysuria or hematuria  NEUROLOGICAL: +As stated in HPI above  SKIN: No itching, burning, rashes, or lesions   All other review of systems is negative unless indicated above.    Allergies:  No Known Allergies      PMHx/PSHx/Family Hx: As above, otherwise see below   DM (Diabetes Mellitus)    HTN (Hypertension)    Dyslipidemia    PUD (Peptic Ulcer Disease)    Diabetic neuropathy    Herniated disc    HTN (hypertension)    DM (diabetes mellitus)    HLD (hyperlipidemia)      Social Hx:  No current use of tobacco, alcohol, or illicit drugs      Medications:  MEDICATIONS  (STANDING):  cefTRIAXone   IVPB 1000 milliGRAM(s) IV Intermittent once    MEDICATIONS  (PRN):        Home Medications:  aspirin 81 mg oral tablet: 1 tab(s) orally once a day (20 Jan 2023 18:49)  cyclobenzaprine 5 mg oral tablet: 1 tab(s) orally 2 times a day (20 Jan 2023 18:49)  gabapentin 300 mg oral capsule: 1 cap(s) orally 2 times a day (20 Jan 2023 18:49)  lisinopril 5 mg oral tablet: 1 tab(s) orally once a day (21 Jan 2023 11:54)  metoprolol tartrate 25 mg oral tablet: 1 tab(s) orally 2 times a day (20 Jan 2023 18:49)  NexIUM 20 mg oral delayed release capsule: 1 cap(s) orally once a day, As Needed    NOTE: Pharmacy directions - 1 cap 2 times daily. (20 Jan 2023 18:49)  Norvasc 2.5 mg oral tablet: 1 tab(s) orally once a day (20 Jan 2023 18:49)  Restasis 0.05% ophthalmic emulsion: 1 drop(s) to each affected eye every 12 hours, As Needed (20 Jan 2023 18:49)  simvastatin 10 mg oral tablet: 1 tab(s) orally once a day (at bedtime) (20 Jan 2023 18:49)      Vitals:  T(C): 37.2 (08-20-23 @ 12:15), Max: 37.4 (08-20-23 @ 11:24)  HR: 100 (08-20-23 @ 15:13) (98 - 104)  BP: 138/79 (08-20-23 @ 15:13) (132/78 - 138/79)  RR: 18 (08-20-23 @ 15:13) (18 - 19)  SpO2: 99% (08-20-23 @ 15:13) (98% - 99%)    Physical Examination:   General - NAD  Cardiovascular - Peripheral pulses palpable, no edema    Neurologic Exam:  Mental status - Awake, Alert, Oriented to person, place, and time. Speech fluent, repetition and naming intact. Follows simple and complex commands.     Cranial nerves - PERRL, VFF, EOMI, face sensation (V1-V3) intact LT, No facial asymmetry b/l, hearing grossly intact b/l, trapezius 5/5 strength b/l, tongue midline on protrusion     Motor - Normal bulk and tone throughout. No pronator drift.  Strength testing            Deltoid      Biceps      Triceps     Wrist Extension    Wrist Flexion       R            5                 5               5                     5                              5                        5  L             5                 5               5                     5                              5                       5              Hip Flexion    Hip Extension    Knee Flexion    Knee Extension    Dorsiflexion    Plantar Flexion  R              5                           5                       5                           5                            5                          5  L              5                           5                        5                           5                            5                          5    Sensation - slight loss of sensation over anterior left ankle extending from knee to mid thigh, otherwise intact throughout    DTR's -             Biceps      Triceps     Brachioradialis      Patellar    Ankle    Toes/plantar response  R             2+             2+                  2+                       2+            2+                 Down  L              2+             2+                 2+                        2+           2+                 Down    Coordination - Finger to Nose intact b/l. No tremors appreciated    Gait and station - unsteady, slow gait. Romberg (+) but not to specific side, she tilts back and forth    Labs:                        10.2   11.20 )-----------( 308      ( 20 Aug 2023 14:15 )             32.1     08-20    132<L>  |  100  |  15  ----------------------------<  147<H>  4.5   |  21<L>  |  0.81    Ca    8.4      20 Aug 2023 18:38    TPro  7.1  /  Alb  3.3  /  TBili  0.2  /  DBili  x   /  AST  13  /  ALT  10  /  AlkPhos  61  08-20    CAPILLARY BLOOD GLUCOSE        LIVER FUNCTIONS - ( 20 Aug 2023 18:38 )  Alb: 3.3 g/dL / Pro: 7.1 g/dL / ALK PHOS: 61 U/L / ALT: 10 U/L / AST: 13 U/L / GGT: x               CSF:    < from: CT Head No Cont (08.20.23 @ 17:12) >  IMPRESSION:  CT HEAD: No acute intracranial findings.    CTA NECK: No vessel narrowing or occlusion in the neck.    CTA HEAD: No evidence of vascular stenosis, occlusion, aneurysm or   vascular malformation.  _____________    < end of copied text >    Radiology:  < from: CT Head No Cont (08.20.23 @ 17:12) >  IMPRESSION:  CT HEAD: No acute intracranial findings.    CTA NECK: No vessel narrowing or occlusion in the neck.    CTA HEAD: No evidence of vascular stenosis, occlusion, aneurysm or   vascular malformation.  _____________    < end of copied text >   Neurology - Consult Note    -  Spectra: [phone] ([facility]), [phone] ([facility]  -    HPI:   Patient JOSHUA AGUIAR is a 68y (1955) woman with a PMHx significant for DM, HLD, HTN who presented to ED on 8/20/2023 with worsening weakness and dizziness for several months. She started ozempic 3 months ago at which time she started having a lot of diarrhea a few weeks in. States she started feeling very weak with dizziness ("room spinning"), lightheadedness, headaches.  She stopped taking ozempic 3 weeks ago but her weakness, dizziness and headaches were worsening to the point where she was needing someone's assistance for walking (previously able to walk independently with cane). Apparently had presyncopal 3 weeks ago where she had LOC and fell to ground but did not hit head, she fell on her bed. Does not recall any chest pain preceding LOC. She was recently in [facility] where she got some lab work done, she brought with her to the ED and it showed elevated white count, low Hgb, UTI. Told by her PCP to come in but came to ED instead. Neurology consulted by ED for +Romberg (to L), unsteady gait, L side parasthesias in L5,S1, occasional slurred speech, continued weakness and room spinning.       Review of Systems:    CONSTITUTIONAL: No fevers or chills  EYES AND ENT: No visual changes or no throat pain   NECK: No pain or stiffness  RESPIRATORY: No hemoptysis or shortness of breath  CARDIOVASCULAR: No chest pain or palpitations  GASTROINTESTINAL: No melena or hematochezia  GENITOURINARY: No dysuria or hematuria  NEUROLOGICAL: +As stated in HPI above  SKIN: No itching, burning, rashes, or lesions   All other review of systems is negative unless indicated above.    Allergies:  No Known Allergies      PMHx/PSHx/Family Hx: As above, otherwise see below   DM (Diabetes Mellitus)    HTN (Hypertension)    Dyslipidemia    PUD (Peptic Ulcer Disease)    Diabetic neuropathy    Herniated disc    HTN (hypertension)    DM (diabetes mellitus)    HLD (hyperlipidemia)      Social Hx:  No current use of tobacco, alcohol, or illicit drugs      Medications:  MEDICATIONS  (STANDING):  cefTRIAXone   IVPB 1000 milliGRAM(s) IV Intermittent once    MEDICATIONS  (PRN):        Home Medications:  aspirin 81 mg oral tablet: 1 tab(s) orally once a day (20 Jan 2023 18:49)  cyclobenzaprine 5 mg oral tablet: 1 tab(s) orally 2 times a day (20 Jan 2023 18:49)  gabapentin 300 mg oral capsule: 1 cap(s) orally 2 times a day (20 Jan 2023 18:49)  lisinopril 5 mg oral tablet: 1 tab(s) orally once a day (21 Jan 2023 11:54)  metoprolol tartrate 25 mg oral tablet: 1 tab(s) orally 2 times a day (20 Jan 2023 18:49)  NexIUM 20 mg oral delayed release capsule: 1 cap(s) orally once a day, As Needed    NOTE: Pharmacy directions - 1 cap 2 times daily. (20 Jan 2023 18:49)  Norvasc 2.5 mg oral tablet: 1 tab(s) orally once a day (20 Jan 2023 18:49)  Restasis 0.05% ophthalmic emulsion: 1 drop(s) to each affected eye every 12 hours, As Needed (20 Jan 2023 18:49)  simvastatin 10 mg oral tablet: 1 tab(s) orally once a day (at bedtime) (20 Jan 2023 18:49)      Vitals:  T(C): 37.2 (08-20-23 @ 12:15), Max: 37.4 (08-20-23 @ 11:24)  HR: 100 (08-20-23 @ 15:13) (98 - 104)  BP: 138/79 (08-20-23 @ 15:13) (132/78 - 138/79)  RR: 18 (08-20-23 @ 15:13) (18 - 19)  SpO2: 99% (08-20-23 @ 15:13) (98% - 99%)    Physical Examination:   General - NAD  Cardiovascular - Peripheral pulses palpable, no edema    Neurologic Exam:  Mental status - Awake, Alert, Oriented to person, place, and time. Speech fluent, repetition and naming intact. Follows simple and complex commands.     Cranial nerves - PERRL, VFF, EOMI, face sensation (V1-V3) intact LT, No facial asymmetry b/l, hearing grossly intact b/l, trapezius 5/5 strength b/l, tongue midline on protrusion     Motor - Normal bulk and tone throughout. No pronator drift.    Strength testing            Deltoid      Biceps      Triceps     Wrist Extension    Wrist Flexion       R            5                 5               5                     5                  5                5  L             5                 5               5                     5                  5              5              Hip Flexion    Hip Extension    Knee Flexion    Knee Extension    Dorsiflexion    Plantar Flexion  R              5                           5                       5                           5                            5                          5  L              5                           5                        5                           5                            5                          5    Sensation - slight loss of sensation over anterior left ankle extending from knee to mid thigh, otherwise intact throughout    DTR's -             Biceps      Triceps     Brachioradialis      Patellar    Ankle    Toes/plantar response  R             2+             2+                  2+                       2+            2+                 Down  L              2+             2+                 2+                        2+           2+                 Down    Coordination - Finger to Nose intact b/l. No tremors appreciated    Gait and station - unsteady, slow gait. Romberg (+) but not to specific side, she tilts back and forth    Labs:                        10.2   11.20 )-----------( 308      ( 20 Aug 2023 14:15 )             32.1     08-20    132<L>  |  100  |  15  ----------------------------<  147<H>  4.5   |  21<L>  |  0.81    Ca    8.4      20 Aug 2023 18:38    TPro  7.1  /  Alb  3.3  /  TBili  0.2  /  DBili  x   /  AST  13  /  ALT  10  /  AlkPhos  61  08-20    CAPILLARY BLOOD GLUCOSE        LIVER FUNCTIONS - ( 20 Aug 2023 18:38 )  Alb: 3.3 g/dL / Pro: 7.1 g/dL / ALK PHOS: 61 U/L / ALT: 10 U/L / AST: 13 U/L / GGT: x               CSF:    < from: CT Head No Cont (08.20.23 @ 17:12) >  IMPRESSION:  CT HEAD: No acute intracranial findings.    CTA NECK: No vessel narrowing or occlusion in the neck.    CTA HEAD: No evidence of vascular stenosis, occlusion, aneurysm or   vascular malformation.  _____________    < end of copied text >    Radiology:  < from: CT Head No Cont (08.20.23 @ 17:12) >  IMPRESSION:  CT HEAD: No acute intracranial findings.    CTA NECK: No vessel narrowing or occlusion in the neck.    CTA HEAD: No evidence of vascular stenosis, occlusion, aneurysm or   vascular malformation.  _____________    < end of copied text >

## 2023-08-20 NOTE — ED PROVIDER NOTE - NSFOLLOWUPINSTRUCTIONS_ED_ALL_ED_FT
You were seen in the emergency department for weakness and lightheadedness.    Your workup in the emergency department includes bloodwork, urine study and CT scan of head.   You can find the results of all the tests in this discharge packet.   Please follow up with your primary care doctor within 48 hours for continuation of care.   Please follow up with neurology for further management of vertigo.   Return to the emergency department if you experience any new/concerning/worsening symptoms such as but not limited to: fever (>100.3F), intractable nausea, vomiting, chest pain, shortness of breath, abdominal pain.     You are prescribed:  1) Cefpodoxime: take 200mg every 12 hours for 7 days

## 2023-08-20 NOTE — ED PROVIDER NOTE - PATIENT PORTAL LINK FT
You can access the FollowMyHealth Patient Portal offered by [facility] by registering at the following website: [email or web address]. By joining [employer]’s FollowMyHealth portal, you will also be able to view your health information using other applications (apps) compatible with our system.

## 2023-08-20 NOTE — ED ADULT NURSE NOTE - OBJECTIVE STATEMENT
pt has been on ozempic for 3 months  she has been experiencing diarrhea, weakness and feels off balance.  she has not contacted her md for this.  here today for those symptoms.

## 2023-08-20 NOTE — ED ADULT NURSE NOTE - NS ED NOTE ABUSE SUSPICION NEGLECT YN
Attempted to call parent to verify patient and sibling appts. Mom scheduled mychart appts for and two other siblings, all for different days. I just wanted to verify that she wanted them to come in different days to see . Unavailable. Left message to call office.     Dates appt's are scheduled:  6/11 6/18 6/25  
No

## 2023-08-20 NOTE — ED ADULT NURSE NOTE - NSFALLUNIVINTERV_ED_ALL_ED
Bed/Stretcher in lowest position, wheels locked, appropriate side rails in place/Call bell, personal items and telephone in reach/Instruct patient to call for assistance before getting out of bed/chair/stretcher/Non-slip footwear applied when patient is off stretcher/[place] to call system/Physically safe environment - no spills, clutter or unnecessary equipment/Purposeful proactive rounding/Room/bathroom lighting operational, light cord in reach

## 2023-08-20 NOTE — ED PROVIDER NOTE - OBJECTIVE STATEMENT
67y/o F pt with PMHx HTN, DM, HLD, presenting into [facility] ED c/o worsening weakness and room spinning several days ago initially beginning several months ago.  Pt describes generalized weakness, room-spinning dizziness, lightheadedness, blurred vision, and occipital headaches for the past several months, however states these symptoms have been getting worse over the past several days.  She ambulates with a cane at baseline for the past several years, however states for the past several months she has been needing assistance with walking from her home aid.  She was started on Ozempic in April, has been getting the prescription from her PCP.  She reports approx. 4wks of watery nonbloody diarrhea, initially starting while on the plane to [facility], she saw a doctor while in [facility] however her insurance did not cover any investigations or treatments.  She was sent for bloodwork upon 69y/o F pt with PMHx HTN, DM, HLD, presenting into [facility] ED c/o worsening weakness and room spinning several days ago initially beginning several months ago.  Pt describes generalized weakness, room-spinning dizziness, lightheadedness, blurred vision, and occipital headaches for the past several months, however states these symptoms have been getting worse over the past several days.  She ambulates with a cane at baseline for the past several years, however states for the past several months she has been needing assistance with walking from her home aid.  She was started on Ozempic in April, has been getting the prescription from her PCP, however she stopped taking the ozempic approx 3wks ago secondary to her current symptoms.  She reports approx. 4wks of watery nonbloody diarrhea, initially starting while on the plane to [facility], she saw a doctor while in [facility] however her insurance did not cover any investigations or treatments.  She was sent for bloodwork upon return to the [place], she has not seen the doctor yet for these symptoms or for her bloodwork results however she has the result paperwork in the ED now showing abnormalities including elevated WCC, low Hb, and UTI.  She was told by her doctor to come into the office for evaluation, however pt states she has been feeling too unwell to attend.

## 2023-08-20 NOTE — ED PROVIDER NOTE - PROGRESS NOTE DETAILS
Jonathon HOLLOWAY), PGY-1: spoke with neurology, discussed pt's hx and clinical presentation, they will come see the pt in the ED. Brian PGY3   Patient signed out to me pending neurology recommendation. In summary 68F w/hx of DM/HTN/HLD presents with few months of generalized weakness and room spinning dizziness. Found to have UTI on UA. CTH and CTA head/neck did not show acute pathologies.  Discussed with neurology - currently low suspicion for posterior stroke and recommends medical treatment for UTI and possible dehydration and outpatient neuro workup. Brian PGY3   Discussed with neurology - recommends outpatient neurology follow up and workup, currently low suspicion for posterior stroke, likely from dehydration and UTI.   Discussed with patient and recommends follow up with PMD and will send antibiotics for UTI.

## 2023-08-20 NOTE — CONSULT NOTE ADULT - ASSESSMENT
Assessment:  Patient JOSHUA AGUIAR is a 68y (1955) woman with a PMHx significant for DM, HLD, HTN who presented to ED on 8/20/2023 with worsening weakness and dizziness for several months. She started ozempic 3 months ago at which time she started having a lot of diarrhea a few weeks in. States she started feeling very weak with dizziness ("room spinning"), lightheadedness, headaches.  She stopped taking ozempic 3 weeks ago but her weakness, dizziness and headaches were worsening to the point where she was needing someone's assistance for walking (previously able to walk independently with cane). She was recently in [facility] where she got some lab work done, she brought with her to the ED and it showed elevated white count, low Hgb, UTI. Told by her PCP to come in but came to ED instead. Neurology consulted by ED for +Romberg (to L), unsteady gait, L side parasthesias in L5,S1, occasional slurred speech, continued weakness and room spinning. CTH, CTA H/N unremarkable for posterior circulation stroke, stenosis, or lesion    Impression:  Patient likely experiencing peripheral vs. metabolic etiology vertigo.   Unlikely to be central ischemia as symptoms are intermittent and there is a lack of focality to the physical examination; however, central, posterior circulation involvement cannot be ruled out.   Rule out central causes, cardiac etiology, toxic metabolic etiology.     Recommendations:  - CBC, BMP, Lipid panel, HbA1C, TSH, thiamine, B12, folate, ammonia, CK, tox screen, infectious workup (if applicable)  - orthostatics  - telemetry Monitoring, EKG  - aspirin 81 mg daily, atorvastatin 80 daily (titrate to LDL <70).  - symptomatic management w/ IVF, Meclizine (antihistamine) 12.5-50 mg BID (outpatient 25mg TID-QID; choice in pregnancy; max daily dose 100mg), diazepam 1mg q12 hrs PO, Clonzepam 0.5mg  q8 PRN, Reglan 4mg  PRN Q8H, Metoclopramide 5-10mg q8hr prn (Avoid if possible b/c many side effects), ondansetron (better): 8mg upto q8hr prn.  Check renal function and QTc.  - if pt is admitted and sx do not resolve, recommend inpatient MRI to r/o central involvement w/ CDU observation (if not admitted for medical necessity). If symptoms resolve with medical management, then patient can have screening MRI outpatient.   - echo, cardiac workup. If strong suspicion, consider ILR (in vs. outpatient)  - if meningismus and/or febrile, consider LP  - caution advised as agents with vestibular action can cause lethargy.  - PT/OT; outpatient [facility].   - dysphagia screen    Patient can follow up with general neurology at [address] [place] [address] 1-2 weeks after discharge and when medically cleared. Please instruct the patient to call [phone] to schedule this appointment.    To be discussed with neurology attending and will be formally staffed during morning rounds. Recommendations will be finalized once signed by attending. Assessment:  Patient JOSHUA AGUIAR is a 68y (1955) woman with a PMHx significant for DM, HLD, HTN who presented to ED on 8/20/2023 with worsening weakness and dizziness for several months. She started ozempic 3 months ago at which time she started having a lot of diarrhea a few weeks in. States she started feeling very weak with dizziness ("room spinning"), lightheadedness, headaches.  She stopped taking ozempic 3 weeks ago but her weakness, dizziness and headaches were worsening to the point where she was needing someone's assistance for walking (previously able to walk independently with cane). She was recently in [facility] where she got some lab work done, she brought with her to the ED and it showed elevated white count, low Hgb, UTI. Told by her PCP to come in but came to ED instead. Neurology consulted by ED for +Romberg (to L), unsteady gait, L side parasthesias in L5,S1, occasional slurred speech, continued weakness and room spinning. CTH, CTA H/N unremarkable for posterior circulation stroke, stenosis, or lesion    Impression:  Patient likely experiencing infectious vs. metabolic etiology vertigo.   Unlikely to be central ischemia as symptoms are intermittent and there is a lack of focality to the physical examination; however, central, posterior circulation involvement cannot be ruled out.   Rule out central causes, cardiac etiology, toxic metabolic etiology.     Recommendations:  - orthostatics  - for vertigo, sx manage with meclizine (2.5-50 mg BID (outpatient 25mg TID-QID; choice in pregnancy; max daily dose 100mg)  - for headaches, sx manage with magnesium 2g, toradol 15IV, reglan, benadryl/compazine  - if pt is admitted and sx do not resolve, recommend inpatient MRI to r/o central involvement w/ CDU observation (if not admitted for medical necessity). If symptoms resolve with medical management, then patient can have screening MRI outpatient.   - currently patient does not have any meningeal signs and is NOT febrile and there is very low concern for meningitis however if clinical picture changes, can consider LP  - caution advised as agents with vestibular action can cause lethargy.  - PT/OT; outpatient [facility].   - dysphagia screen    Patient can follow up with general neurology at [address] [place] [address] 1-2 weeks after discharge and when medically cleared. Please instruct the patient to call [phone] to schedule this appointment.    To be discussed with neurology attending and will be formally staffed during morning rounds. Recommendations will be finalized once signed by attending.

## 2023-08-20 NOTE — ED PROVIDER NOTE - PHYSICAL EXAMINATION
Const: Awake, alert, no acute distress.  Well appearing.  Moving comfortably on stretcher.  HEENT: NC/AT.  Moist mucous membranes.  No pharyngeal erythema, no exudates.  Eyes: Extraocular movements intact b/l.  Pupils equal, round, and reactive to light b/l.  Conjunctiva pink.  No scleral icterus.  Neck: Neck supple, full ROM without pain.  Cardiac: Regular rate and regular rhythm. S1 S2 present.  Peripheral pulses 2+ and symmetric.  No LE edema.  Resp: Speaking in full sentences. No evidence of respiratory distress.  Breath sounds clear to auscultation b/l.  Abd: Non-distended, no overlying skin changes.  Soft, non-tender, no guarding, no rigidity, no rebound tenderness.  No palpable masses.  Normal bowel sounds in all 4 quadrants.  Back: Spine midline and non-tender. No CVAT.  Skin: Normal coloration.  No rashes, abrasions or lacerations.  Neuro: Awake, alert & oriented x 3.  Moves all extremities spontaneously.  No focal deficits.  CNII-XII intact, normal strength to b/l UEs and LEs, normal sensation to b/l UEs and RLE, diminished sensation to LLE in L5 and S1 dermatomal distribution.  Normal finger to nose, normal heel to shin, no dysdiadochokinesis, abnormal rhomberg, unsteady gait, no ataxia.

## 2023-08-20 NOTE — ED PROVIDER NOTE - NSFOLLOWUPCLINICS_GEN_ALL_ED_FT
[facility]  Neurology Epilepsy  [address] [place] [address], [address] [place] [id number]  Phone: [phone]  Fax: [phone]

## 2023-08-20 NOTE — ED PROVIDER NOTE - ATTENDING CONTRIBUTION TO CARE
Hx: 2 months now of ongoing vertigo, worse with standing and walking, associated with imbalance even while using her cane, noted intermittent slurred speech by family, and intermittent blurry vision.  No focal weakness, numbness, diplopia, dyphagia.  Pt thought may be due to Ozempic, stopped 3wks ago, still persisting.  NO fever, cp, sob.     PE: well appearing, nontoxic, no respiratory distress.    PERRL, EOMI.  No CN defect. No motor defect.      NO inducible nystagmus.    +Romberg (significant sway to left without provocation).     Very unsteady on feet, unable to perform gait analysis.        MDM: subacute vertigo, imbalance, concern for possible posterior brain stroke vs stenosis vs lesion, could also consider metabolic causes, medication effect, or viral infection.  check cbc r/o anemia or leukocytosis, check bmp to r/o metabolic derangement and lyte imbalance, rvp, ct and ct angio head and neck, neuro consult. PT may require inpatient workup for MRI brain and PT consult, pt may not be a safe discharge due to imbalance.

## 2023-10-16 ENCOUNTER — APPOINTMENT (OUTPATIENT)
Dept: ENDOCRINOLOGY | Facility: CLINIC | Age: 68
End: 2023-10-16
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
## 2023-12-12 ENCOUNTER — APPOINTMENT (OUTPATIENT)
Dept: OTOLARYNGOLOGY | Facility: CLINIC | Age: 68
End: 2023-12-12
Payer: MEDICARE

## 2023-12-12 VITALS
HEIGHT: 63 IN | DIASTOLIC BLOOD PRESSURE: 73 MMHG | HEART RATE: 70 BPM | WEIGHT: 165 LBS | SYSTOLIC BLOOD PRESSURE: 132 MMHG | BODY MASS INDEX: 29.23 KG/M2 | OXYGEN SATURATION: 100 %

## 2023-12-12 PROCEDURE — 31579 LARYNGOSCOPY TELESCOPIC: CPT

## 2023-12-12 PROCEDURE — 99214 OFFICE O/P EST MOD 30 MIN: CPT | Mod: 25

## 2024-02-19 ENCOUNTER — APPOINTMENT (OUTPATIENT)
Dept: ULTRASOUND IMAGING | Facility: IMAGING CENTER | Age: 69
End: 2024-02-19
Payer: MEDICARE

## 2024-02-19 ENCOUNTER — OUTPATIENT (OUTPATIENT)
Dept: OUTPATIENT SERVICES | Facility: HOSPITAL | Age: 69
LOS: 1 days | End: 2024-02-19
Payer: COMMERCIAL

## 2024-02-19 DIAGNOSIS — E04.9 NONTOXIC GOITER, UNSPECIFIED: ICD-10-CM

## 2024-02-19 PROCEDURE — 76536 US EXAM OF HEAD AND NECK: CPT

## 2024-02-19 PROCEDURE — 76536 US EXAM OF HEAD AND NECK: CPT | Mod: 26

## 2024-05-22 ENCOUNTER — APPOINTMENT (OUTPATIENT)
Dept: OTOLARYNGOLOGY | Facility: CLINIC | Age: 69
End: 2024-05-22
Payer: MEDICARE

## 2024-05-22 VITALS — SYSTOLIC BLOOD PRESSURE: 118 MMHG | OXYGEN SATURATION: 100 % | HEART RATE: 67 BPM | DIASTOLIC BLOOD PRESSURE: 71 MMHG

## 2024-05-22 PROCEDURE — 99214 OFFICE O/P EST MOD 30 MIN: CPT | Mod: 25

## 2024-05-22 PROCEDURE — 31579 LARYNGOSCOPY TELESCOPIC: CPT

## 2024-05-22 RX ORDER — ESOMEPRAZOLE MAGNESIUM 20 MG/1
20 CAPSULE, DELAYED RELEASE ORAL TWICE DAILY
Qty: 180 | Refills: 0 | Status: ACTIVE | COMMUNITY
Start: 2021-05-13 | End: 1900-01-01

## 2024-05-22 RX ORDER — FAMOTIDINE 20 MG/1
20 TABLET, FILM COATED ORAL
Qty: 90 | Refills: 0 | Status: ACTIVE | COMMUNITY
Start: 2024-05-22 | End: 1900-01-01

## 2024-05-22 NOTE — DATA REVIEWED
[de-identified] : Thyroid ultrasound 2/19/2024.  Impression: Scattered bilateral thyroid nodules including dominant right thyroid nodule, not significantly changed.

## 2024-05-22 NOTE — HISTORY OF PRESENT ILLNESS
[de-identified] : 69 year old female, following up for thyroid mass.  History of Left neck mass and thyroid nodule Thyroid ultrasound 2/19/2024. Impression: Scattered bilateral thyroid nodules including dominant right thyroid nodule, not significantly changed. TI-RAD 3: Mildly suspicious  Reports cough resolved.  Continues to use Nexium and Famotidine with relief.  Patient denies dysphagia, odynophagia, dyspnea, dysphonia, throat pain, neck swelling and recent throat infections.   Denies nasal congestion, sinus pain/pressure, post nasal drip, nasal discharge.  Denies otalgia, otorrhea, recent ear infections, bleeding and tinnitus.  No recent hospitalization

## 2024-05-22 NOTE — CONSULT LETTER
[Dear  ___] : Dear  [unfilled], [Courtesy Letter:] : I had the pleasure of seeing your patient, [unfilled], in my office today. [Please see my note below.] : Please see my note below. [Consult Closing:] : Thank you very much for allowing me to participate in the care of this patient.  If you have any questions, please do not hesitate to contact me. [Sincerely,] : Sincerely, [FreeTextEntry2] : Natasha Angulo MD  [FreeTextEntry3] : Pierre Rueda MD, PhD Chief, Division of Laryngology Department of Otolaryngology [facility] Pediatric Otolaryngology, [facility]  of Otolaryngology [facility]

## 2024-10-13 NOTE — H&P ADULT - PROBLEM SELECTOR PLAN 7
Your x-ray was negative for fractures.    Remember to use the word RICE to help guide treatment for pain and swelling.  Rest, ice, compression, elevation.  Continue to use the wrist brace to help provide comfort and support, you should wean out of it as you are able to tolerated.  You may treat pain with over-the-counter ibuprofen and Tylenol.  A referral was sent to an orthopedic specialist and you should be receiving a call from their office.   diet: cc diet  dvt ppx: encourage ambulation  dispo: pending improvement in lactic acidosis

## 2024-12-20 ENCOUNTER — APPOINTMENT (OUTPATIENT)
Dept: OTOLARYNGOLOGY | Facility: CLINIC | Age: 69
End: 2024-12-20

## 2025-01-20 ENCOUNTER — OUTPATIENT (OUTPATIENT)
Dept: OUTPATIENT SERVICES | Facility: HOSPITAL | Age: 70
LOS: 1 days | End: 2025-01-20
Payer: COMMERCIAL

## 2025-01-20 ENCOUNTER — APPOINTMENT (OUTPATIENT)
Dept: ULTRASOUND IMAGING | Facility: IMAGING CENTER | Age: 70
End: 2025-01-20
Payer: MEDICARE

## 2025-01-20 DIAGNOSIS — Z00.8 ENCOUNTER FOR OTHER GENERAL EXAMINATION: ICD-10-CM

## 2025-01-20 DIAGNOSIS — E04.9 NONTOXIC GOITER, UNSPECIFIED: ICD-10-CM

## 2025-01-20 PROCEDURE — 76536 US EXAM OF HEAD AND NECK: CPT | Mod: 26

## 2025-01-20 PROCEDURE — 76536 US EXAM OF HEAD AND NECK: CPT

## 2025-02-06 ENCOUNTER — NON-APPOINTMENT (OUTPATIENT)
Age: 70
End: 2025-02-06

## 2025-03-31 NOTE — ED PROVIDER NOTE - CARE PLAN
1 Principal Discharge DX:	Acute hemorrhagic cystitis

## 2025-04-08 ENCOUNTER — TRANSCRIPTION ENCOUNTER (OUTPATIENT)
Age: 70
End: 2025-04-08

## 2025-04-08 ENCOUNTER — OUTPATIENT (OUTPATIENT)
Dept: OUTPATIENT SERVICES | Facility: HOSPITAL | Age: 70
LOS: 1 days | End: 2025-04-08
Payer: COMMERCIAL

## 2025-04-08 VITALS
HEART RATE: 74 BPM | RESPIRATION RATE: 17 BRPM | OXYGEN SATURATION: 100 % | WEIGHT: 175.05 LBS | TEMPERATURE: 98 F | DIASTOLIC BLOOD PRESSURE: 63 MMHG | SYSTOLIC BLOOD PRESSURE: 143 MMHG | HEIGHT: 63 IN

## 2025-04-08 VITALS
DIASTOLIC BLOOD PRESSURE: 70 MMHG | SYSTOLIC BLOOD PRESSURE: 110 MMHG | OXYGEN SATURATION: 98 % | RESPIRATION RATE: 16 BRPM | HEART RATE: 77 BPM

## 2025-04-08 DIAGNOSIS — R94.39 ABNORMAL RESULT OF OTHER CARDIOVASCULAR FUNCTION STUDY: ICD-10-CM

## 2025-04-08 LAB
ANION GAP SERPL CALC-SCNC: 11 MMOL/L — SIGNIFICANT CHANGE UP (ref 5–17)
BUN SERPL-MCNC: 19 MG/DL — SIGNIFICANT CHANGE UP (ref 7–23)
CALCIUM SERPL-MCNC: 10.1 MG/DL — SIGNIFICANT CHANGE UP (ref 8.4–10.5)
CHLORIDE SERPL-SCNC: 105 MMOL/L — SIGNIFICANT CHANGE UP (ref 96–108)
CO2 SERPL-SCNC: 25 MMOL/L — SIGNIFICANT CHANGE UP (ref 22–31)
CREAT SERPL-MCNC: 1.13 MG/DL — SIGNIFICANT CHANGE UP (ref 0.5–1.3)
EGFR: 53 ML/MIN/1.73M2 — LOW
EGFR: 53 ML/MIN/1.73M2 — LOW
GLUCOSE BLDC GLUCOMTR-MCNC: 107 MG/DL — HIGH (ref 70–99)
GLUCOSE SERPL-MCNC: 108 MG/DL — HIGH (ref 70–99)
HCT VFR BLD CALC: 36.9 % — SIGNIFICANT CHANGE UP (ref 34.5–45)
HGB BLD-MCNC: 11.5 G/DL — SIGNIFICANT CHANGE UP (ref 11.5–15.5)
MCHC RBC-ENTMCNC: 24.8 PG — LOW (ref 27–34)
MCHC RBC-ENTMCNC: 31.2 G/DL — LOW (ref 32–36)
MCV RBC AUTO: 79.7 FL — LOW (ref 80–100)
NRBC BLD AUTO-RTO: 0 /100 WBCS — SIGNIFICANT CHANGE UP (ref 0–0)
PLATELET # BLD AUTO: 325 K/UL — SIGNIFICANT CHANGE UP (ref 150–400)
POTASSIUM SERPL-MCNC: 5.5 MMOL/L — HIGH (ref 3.5–5.3)
POTASSIUM SERPL-SCNC: 5.5 MMOL/L — HIGH (ref 3.5–5.3)
RBC # BLD: 4.63 M/UL — SIGNIFICANT CHANGE UP (ref 3.8–5.2)
RBC # FLD: 14.4 % — SIGNIFICANT CHANGE UP (ref 10.3–14.5)
SODIUM SERPL-SCNC: 141 MMOL/L — SIGNIFICANT CHANGE UP (ref 135–145)
WBC # BLD: 12.81 K/UL — HIGH (ref 3.8–10.5)
WBC # FLD AUTO: 12.81 K/UL — HIGH (ref 3.8–10.5)

## 2025-04-08 PROCEDURE — 99152 MOD SED SAME PHYS/QHP 5/>YRS: CPT

## 2025-04-08 PROCEDURE — 80048 BASIC METABOLIC PNL TOTAL CA: CPT

## 2025-04-08 PROCEDURE — 93010 ELECTROCARDIOGRAM REPORT: CPT

## 2025-04-08 PROCEDURE — C1769: CPT

## 2025-04-08 PROCEDURE — 36415 COLL VENOUS BLD VENIPUNCTURE: CPT

## 2025-04-08 PROCEDURE — C1894: CPT

## 2025-04-08 PROCEDURE — 93005 ELECTROCARDIOGRAM TRACING: CPT

## 2025-04-08 PROCEDURE — 85027 COMPLETE CBC AUTOMATED: CPT

## 2025-04-08 PROCEDURE — 93454 CORONARY ARTERY ANGIO S&I: CPT | Mod: 26

## 2025-04-08 PROCEDURE — 82962 GLUCOSE BLOOD TEST: CPT

## 2025-04-08 PROCEDURE — 93454 CORONARY ARTERY ANGIO S&I: CPT

## 2025-04-08 PROCEDURE — C1887: CPT

## 2025-04-08 RX ORDER — METFORMIN HYDROCHLORIDE 850 MG/1
2 TABLET ORAL
Qty: 120 | Refills: 1
Start: 2025-04-08

## 2025-04-08 RX ORDER — METOPROLOL SUCCINATE 50 MG/1
1 TABLET, EXTENDED RELEASE ORAL
Refills: 0 | DISCHARGE

## 2025-04-08 RX ORDER — INSULIN GLARGINE-YFGN 100 [IU]/ML
50 INJECTION, SOLUTION SUBCUTANEOUS
Refills: 0 | DISCHARGE

## 2025-04-08 RX ORDER — LOSARTAN POTASSIUM 100 MG/1
1 TABLET, FILM COATED ORAL
Refills: 0 | DISCHARGE

## 2025-04-08 RX ORDER — SODIUM ZIRCONIUM CYCLOSILICATE 5 G/5G
10 POWDER, FOR SUSPENSION ORAL ONCE
Refills: 0 | Status: COMPLETED | OUTPATIENT
Start: 2025-04-08 | End: 2025-04-08

## 2025-04-08 RX ADMIN — Medication 750 MILLILITER(S): at 09:09

## 2025-04-08 RX ADMIN — Medication 75 MILLILITER(S): at 09:09

## 2025-04-08 RX ADMIN — SODIUM ZIRCONIUM CYCLOSILICATE 10 GRAM(S): 5 POWDER, FOR SUSPENSION ORAL at 10:00

## 2025-04-08 NOTE — ASU DISCHARGE PLAN (ADULT/PEDIATRIC) - ASU DC SPECIAL INSTRUCTIONSFT
Wound Care:   the day AFTER your procedure remove bandage GENTLY, and clean using  mild soap and gentle warm, water stream, pat dry. leave OPEN to air. YOU MAY SHOWER   DO NOT apply lotions, creams, ointments, powder, perfumes to your incision site  DO NOT SOAK your site for 1 week ( no baths, no pools, no tubs, etc...)  Check  your groin and/or wrist daily. A small amount of bruising, and soreness are normal    ACTIVITY: for 24 hours   - DO NOT DRIVE  - DO NOT make any important decisions or sign legal documents   - DO NOT operate heavy machineries   - you may resume sexual activity in 48 hours, unless otherwise instructed by your cardiologist     If your procedure was done through the WRIST: for the NEXT 3 DAYS  - avoid pushing, pulling, with that affected wrist   - avoid repeated movement of that hand and wrist ( eg: typing, hammering)  - DO NOT LIFT anything more than 5 lbs     If your procedure was done through the GROIN: for the NEXT 5 DAYS  - Limit climbing stairs, DO NOT soak in bathtub or pool  - no strenuous activities, pushing, pulling, straining  - Do not lift anything 10lbs or heavier     MEDICATION:   take your medications as explained ( see discharge paperwork)   If you received a STENT, you will be taking antiplatelet medications to KEEP YOUR STENT OPEN ( eg: Aspirin, Plavix, Brilinta, Effient, etc).  Take as prescribed DO NOT STOP taking them without consulting with your cardiologist first.     Follow heart healthy diet recommended by your doctor, , if you smoke STOP SMOKING ( may call [phone] for center of tobacco control if you need assistance)     CALL your doctor to make appointment in 2 WEEKS     ***CALL YOUR DOCTOR***  if you experience: fever, chills, body aches, or severe pain, swelling, redness, heat or yellow discharge at incision site  If you experience Bleeding or excruciating pain at the procedural site, swelling (golf ball size) at your procedural site  If you experience CHEST PAIN  If you experience extremity numbness, tingling, temperature change (of your procedural site)   If you are unable to reach your doctor, you may contact:   -Cardiology Office at [facility] at [phone] or   - [facility] [phone]  - [facility] [phone]

## 2025-04-08 NOTE — H&P CARDIOLOGY - NSICDXFAMILYHX_GEN_ALL_CORE_FT
FAMILY HISTORY:  No pertinent family history in first degree relatives     FAMILY HISTORY:  Mother  Still living? Unknown  FH: HTN (hypertension), Age at diagnosis: Age Unknown    Sibling  Still living? Unknown  FH: HTN (hypertension), Age at diagnosis: Age Unknown

## 2025-04-08 NOTE — ASU DISCHARGE PLAN (ADULT/PEDIATRIC) - FINANCIAL ASSISTANCE
[facility] provides services at a reduced cost to those who are determined to be eligible through [facility]’s financial assistance program. Information regarding [facility]’s financial assistance program can be found by going to [email or web address] or by calling [phone].

## 2025-04-08 NOTE — ASU DISCHARGE PLAN (ADULT/PEDIATRIC) - NS MD DC FALL RISK RISK
For information on Fall & Injury Prevention, visit: [email or web address] OR  [email or web address] OR  https://www.cdc.gov/steadi/patient.html

## 2025-04-08 NOTE — ASU DISCHARGE PLAN (ADULT/PEDIATRIC) - CARE PROVIDER_API CALL
Kristy Liriano  Cardiovascular Disease  [address] [place] [id number]  Phone: [phone]  Fax: [phone]  Established Patient  Follow Up Time: 1 month

## 2025-04-08 NOTE — ASU PATIENT PROFILE, ADULT - AS SC BRADEN ACTIVITY
Note Text (......Xxx Chief Complaint.): This diagnosis correlates with the Detail Level: Detailed Render Risk Assessment In Note?: no Other (Free Text): BF to call patient with Biopsy results once report has been finalized. (3) walks occasionally

## 2025-04-08 NOTE — ASU PATIENT PROFILE, ADULT - TOBACCO USE
Occupational Therapy   Treatment    Name: Edita Riley  MRN: [id number]  Admitting Diagnosis:  Hydronephrosis of left kidney  3 Days Post-Op    Recommendations:     Discharge Recommendations: Low Intensity Therapy  Discharge Equipment Recommendations:  none  Barriers to discharge:  None    Assessment:     dEita Riley is a 63 y.o. female with a medical diagnosis of Hydronephrosis of left kidney.  She presents with the fallowing performance deficits affecting function are weakness, impaired endurance, impaired self care skills, impaired functional mobility, gait instability, decreased lower extremity function, impaired cardiopulmonary response to activity, pain, impaired balance. Patient agreeable to participate with therapy intervention, patient is demonstrating progress with functional bed mobility, transfers, and grooming task. Patient continues to exhibit poor activity tolerance due pain and weakness. Patient would benefit from Low intensity therapy post acute environment to address over all functional decline with mobility, endurance, balance, and ADLs.     Rehab Prognosis:  Good; patient would benefit from acute skilled OT services to address these deficits and reach maximum level of function.       Plan:     Patient to be seen 4 x/week to address the above listed problems via self-care/home management, therapeutic activities, therapeutic exercises, neuromuscular re-education  Plan of Care Expires: 03/08/24  Plan of Care Reviewed with: patient    Subjective     Chief Complaint: tired   Patient/Family Comments/goals: to get better and return to PLOF  Pain/Comfort:  Pain Rating 1: 0/10    Objective:     Communicated with: Nurse prior to session.  Patient found HOB elevated with colostomy, peripheral IV, nino catheter, ORESTES drain upon OT entry to room.  A client care conference was completed by the OTR and the HUGHES prior to treatment by the HUGHES to discuss the patient's POC and current  status.   General Precautions: Standard, fall    Orthopedic Precautions:N/A  Braces: N/A  Respiratory Status: Room air     Occupational Performance:     Bed Mobility:    Patient completed Rolling/Turning to Right with stand by assistance  Patient completed Scooting/Bridging with stand by assistance  Patient completed Supine to Sit with stand by assistance  Patient completed Sit to Supine with stand by assistance and contact guard assistance     Functional Mobility/Transfers:  Patient completed Sit <> Stand Transfer with contact guard assistance  with  rolling walker   Functional Mobility: Patient ambulated from bed<>bathroom with SBA with RW     Activities of Daily Living:  Grooming: modified independence with setup to complete an oral hygiene task in standing       [facility] 6 Click ADL: 18    Treatment & Education:  HARPREET educated patient on the importance to continue to performed OOB activities with the assistance from nursing staff to reduce the risk for debility to progress.  Addressed patient's questions and concerns within HUGHES scope of practice.    Patient left HOB elevated with all lines intact, call button in reach, and nurse notified    GOALS:   Multidisciplinary Problems       Occupational Therapy Goals          Problem: Occupational Therapy    Goal Priority Disciplines Outcome Interventions   Occupational Therapy Goal     OT, PT/OT Ongoing, Progressing    Description: Goals to be met by: 2/14/24    Patient will increase functional independence with ADLs by performing:    LE Dressing with Stand-by Assistance.  Grooming while standing at sink with Set-up Assistance.  Toileting from toilet with Stand-by Assistance for hygiene and clothing management.   Supine to sit with Supervision.  Toilet transfer to toilet with Supervision.                         Time Tracking:     OT Date of Treatment: 02/09/24  OT Start Time: 1400  OT Stop Time: 1430  OT Total Time (min): 30 min    Billable Minutes:Self Care/Home  Management 15  Therapeutic Activity 15    OT/HARPREET: HARPREET     Number of HARPREET visits since last OT visit: 1    2/9/2024   Never smoker
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
## 2025-04-08 NOTE — H&P CARDIOLOGY - HISTORY OF PRESENT ILLNESS
HPI: 68F with PMH/PSH including DM, HLD, HTN, mild CAD on CTA cors 5/2023   HPI: 68F with PMH/PSH including T2DM on insulin (managed by PMD, A1c 8.8), HLD, HTN, mild CAD on CTA cors 5/2023 who c/o increasing sob with exertion x  many months. Per pt report stress test WNL (no corroborating data available) , but due to ongoing symptoms pt referred for left heart cath.      Cards: Dr Liriano   HPI: 68F with h/o T2DM on insulin (managed by PMD, A1c 8.8), HLD, HTN, mild CAD on CTA cors 5/2023 who c/o increasing sob with exertion x  many months. Per pt report stress test WNL (no corroborating data available) , but due to ongoing symptoms pt referred for left heart cath.      Cards: Dr Liriano
